# Patient Record
Sex: FEMALE | Race: WHITE | HISPANIC OR LATINO | Employment: OTHER | URBAN - METROPOLITAN AREA
[De-identification: names, ages, dates, MRNs, and addresses within clinical notes are randomized per-mention and may not be internally consistent; named-entity substitution may affect disease eponyms.]

---

## 2017-01-06 RX ORDER — METOPROLOL TARTRATE 25 MG/1
TABLET, FILM COATED ORAL
Qty: 180 TABLET | Refills: 1 | Status: SHIPPED | OUTPATIENT
Start: 2017-01-06 | End: 2017-07-12 | Stop reason: SDUPTHER

## 2017-01-17 ENCOUNTER — OFFICE VISIT (OUTPATIENT)
Dept: CARDIOLOGY | Facility: CLINIC | Age: 82
End: 2017-01-17
Payer: MEDICARE

## 2017-01-17 VITALS
HEIGHT: 56 IN | DIASTOLIC BLOOD PRESSURE: 65 MMHG | BODY MASS INDEX: 26.77 KG/M2 | WEIGHT: 119 LBS | OXYGEN SATURATION: 94 % | HEART RATE: 70 BPM | SYSTOLIC BLOOD PRESSURE: 142 MMHG

## 2017-01-17 DIAGNOSIS — I10 BENIGN HYPERTENSION: ICD-10-CM

## 2017-01-17 DIAGNOSIS — E78.00 PURE HYPERCHOLESTEROLEMIA: ICD-10-CM

## 2017-01-17 DIAGNOSIS — I25.83 CORONARY ARTERY DISEASE DUE TO LIPID RICH PLAQUE: Primary | ICD-10-CM

## 2017-01-17 DIAGNOSIS — I82.401 ACUTE DEEP VEIN THROMBOSIS (DVT) OF RIGHT LOWER EXTREMITY, UNSPECIFIED VEIN: ICD-10-CM

## 2017-01-17 DIAGNOSIS — I82.533: ICD-10-CM

## 2017-01-17 DIAGNOSIS — E03.9 HYPOTHYROIDISM (ACQUIRED): ICD-10-CM

## 2017-01-17 DIAGNOSIS — I73.9 PERIPHERAL VASCULAR DISEASE, UNSPECIFIED: ICD-10-CM

## 2017-01-17 DIAGNOSIS — I25.10 CORONARY ARTERY DISEASE DUE TO LIPID RICH PLAQUE: Primary | ICD-10-CM

## 2017-01-17 PROCEDURE — 99999 PR PBB SHADOW E&M-EST. PATIENT-LVL III: CPT | Mod: PBBFAC,,, | Performed by: INTERNAL MEDICINE

## 2017-01-17 PROCEDURE — 1160F RVW MEDS BY RX/DR IN RCRD: CPT | Mod: S$GLB,,, | Performed by: INTERNAL MEDICINE

## 2017-01-17 PROCEDURE — 99214 OFFICE O/P EST MOD 30 MIN: CPT | Mod: S$GLB,,, | Performed by: INTERNAL MEDICINE

## 2017-01-17 PROCEDURE — 1126F AMNT PAIN NOTED NONE PRSNT: CPT | Mod: S$GLB,,, | Performed by: INTERNAL MEDICINE

## 2017-01-17 PROCEDURE — 1157F ADVNC CARE PLAN IN RCRD: CPT | Mod: S$GLB,,, | Performed by: INTERNAL MEDICINE

## 2017-01-17 PROCEDURE — 1159F MED LIST DOCD IN RCRD: CPT | Mod: S$GLB,,, | Performed by: INTERNAL MEDICINE

## 2017-01-17 PROCEDURE — 99499 UNLISTED E&M SERVICE: CPT | Mod: S$GLB,,, | Performed by: INTERNAL MEDICINE

## 2017-01-17 NOTE — PROGRESS NOTES
Subjective:    Patient ID:  Nany Crenshaw is a 89 y.o. female who presents for follow-up of Coronary Artery Disease      Coronary Artery Disease   Symptoms include muscle weakness.    previous history:  Here for follow-up of DVT.  Her leg swelling has resolved and her right leg.  She feels okay but had some mild dizziness this morning for the first time.  She's not noticed any complications from her anticoagulation.  She's not noticed any signs of bleeding.  She denies any worsening cardiopulmonary complaints.  She's wondering whether she can get off the medicine at this point.  She also has a possible dental extraction coming up.  Otherwise she's better usual state of health.    Today:  Here for follow-up of DVT.  She feels a little bit better than last time.  She says she's had more return of color in her right lower leg.  She denies any worsening pain or cardiopulmonary complaints.  She's been trying to do a little bit of physical therapy with home health.  Otherwise she's better usual state of health.  Again she's mainly wondering about when she can discontinue her oral anticoagulation.  Otherwise she's had no significant problems with this since we last saw her.      Review of Systems   Constitution: Negative.   HENT: Negative.    Eyes: Negative.    Cardiovascular: Negative for dyspnea on exertion, irregular heartbeat, near-syncope, orthopnea, paroxysmal nocturnal dyspnea and syncope.   Skin: Negative.    Musculoskeletal: Positive for joint pain and muscle weakness.   Gastrointestinal: Negative for abdominal pain, constipation and diarrhea.   Genitourinary: Negative for dysuria.   Neurological: Negative.    Psychiatric/Behavioral: Negative.         Objective:    Physical Exam   Constitutional: She is oriented to person, place, and time. She appears well-developed and well-nourished.   HENT:   Head: Normocephalic and atraumatic.   Eyes: Conjunctivae and EOM are normal. Pupils are equal, round, and reactive to  light.   Neck: Normal range of motion. Neck supple. No thyromegaly present.   Cardiovascular: Normal rate and regular rhythm.    No murmur heard.  Pulmonary/Chest: Effort normal and breath sounds normal. No respiratory distress.   Abdominal: Soft. Bowel sounds are normal.   Musculoskeletal: Normal range of motion. She exhibits no edema.   Right calf greater than left calf   Neurological: She is alert and oriented to person, place, and time.   Skin: Skin is warm and dry.   Psychiatric: She has a normal mood and affect. Her behavior is normal.         Assessment:       1. Coronary artery disease due to lipid rich plaque    2. Acute deep vein thrombosis (DVT) of right lower extremity, unspecified vein    3. Benign hypertension    4. Hypothyroidism (acquired)    5. Pure hypercholesterolemia    6. Peripheral vascular disease, unspecified         Plan:       -cont med tx  -cont to follow sx mainly  -cont eliquis and repeat ultrasound of the right lower extremity at next visit      RTC 3 mos w/ US RLE venous

## 2017-01-17 NOTE — MR AVS SNAPSHOT
[facility] - Cardiology  [address] [place] [address] [place] [address]  Phone: [phone]                  Nany Crenshaw   2017 3:00 PM   Office Visit    Descripción:  Female : 1927   Personal Médico:  Santos Noe MD   Departamento:  [facility]           Razón de la bety     Coronary Artery Disease           Diagnósticos de Esta Visita        Comentarios    Coronary artery disease due to lipid rich plaque    -  Primario     Acute deep vein thrombosis (DVT) of right lower extremity, unspecified vein         Benign hypertension         Hypothyroidism (acquired)         Pure hypercholesterolemia         Peripheral vascular disease, unspecified                Lista de tareas           Metas (5 Years of Data)     Ninguna      [facility]     [facility] - Asistencia   Enfermeras registradas de [facility] pueden ayudarle a reservar rey bety, proveer educación para la hugo, asesoría clínica, y otros servicios de asesoramiento.   Llame para yuly servicio gratuito a [phone].             Medicamentos           Mensaje sobre Medicamentos     Verificar los cambios y / o adiciones a flores régimen de medicación son los mismos que discutir con flores médico. Si cualquiera de estos cambios o adiciones son incorrectos, por favor notifique a flores proveedor de atención médica.        DEJAR de brendon estos medicamentos     acetaminophen (TYLENOL) 325 MG tablet Take 650 mg by mouth every 6 (six) hours as needed for Pain.    aspirin (ECOTRIN) 81 MG EC tablet Take 81 mg by mouth once daily.    ferrous sulfate 325 (65 FE) MG EC tablet Take 325 mg by mouth 3 (three) times daily with meals.    escitalopram oxalate (LEXAPRO) 5 MG Tab TAKE 1 TABLET BY MOUTH DAILY    hydrocodone-acetaminophen 7.5-325mg (NORCO) 7.5-325 mg per tablet Take 1 tablet by mouth every 6 (six) hours as needed for Pain.           Verifique que la siguiente lista de medicamentos es rey representación exacta  "de los medicamentos que está tomando actualmente. Si no hay ningunos reportados, la lista puede estar en banuelos. Si no es correcta, por favor póngase en contacto con flores proveedor de atención médica. Lleve esta lista con usted en salome de emergencia.           Medicamentos Actuales     apixaban 5 mg Tab Take 1 tablet (5 mg total) by mouth 2 (two) times daily.    bimatoprost (LUMIGAN) 0.03 % ophthalmic drops 1 drop every evening.    ezetimibe-simvastatin 10-40 mg (VYTORIN 10-40) 10-40 mg per tablet Take 1 tablet by mouth every evening.    hydrochlorothiazide (MICROZIDE) 12.5 mg capsule Take 1 capsule (12.5 mg total) by mouth once daily.    levothyroxine (SYNTHROID) 50 MCG tablet TAKE 1 TABLET BY MOUTH EVERY DAY    lisinopril 10 MG tablet TAKE 1 AND 1/2 TABLETS BY MOUTH EVERY DAY    metoprolol tartrate (LOPRESSOR) 25 MG tablet TAKE 1 TABLET BY MOUTH TWICE DAILY    simvastatin (ZOCOR) 40 MG tablet Take 1 tablet (40 mg total) by mouth every evening.           Información de referencia clínica           Signos vitales - más recientes  Última actualización: 2017  3:14 PM por MANI Richardson [employer] [place] [employer] LMP (última lorraine) SpO2    (!) 142/65 (BP Location: Left arm, Patient Position: Sitting, BP Method: Automatic) 70 4' 8" (1.422 m) 54 kg (119 lb) (LMP Unknown) (!) 94%    BMI (IMC)                   26.68 kg/m2           Blood Pressure          Most Recent Value    BP  (!)  142/65      Alergias     A partir del:  2017        No Known Allergies      Vacunas     Administradas en la fecha de la visita:  2017        None               Nany Crenshaw   2017 3:00 PM   Office Visit    Description:  Female : 1927   Provider:  Santos Noe MD   Department:  [facility] - Cardiology           Reason for Visit     Coronary Artery Disease           Diagnoses this Visit        Comments    Coronary artery disease due to lipid rich plaque    -  Primary     Acute deep vein thrombosis (DVT) of right " lower extremity, unspecified vein         Benign hypertension         Hypothyroidism (acquired)         Pure hypercholesterolemia         Peripheral vascular disease, unspecified                To Do List           Goals     None      [facility]     [facility] On Call Nurse Care Line - 24/7 Assistance  Registered nurses in the [facility] On Call Center provide clinical advisement, health education, appointment booking, and other advisory services.  Call for this free service at [phone].             Medications           Message regarding Medications     Verify the changes and/or additions to your medication regime listed below are the same as discussed with your clinician today.  If any of these changes or additions are incorrect, please notify your healthcare provider.        STOP taking these medications     acetaminophen (TYLENOL) 325 MG tablet Take 650 mg by mouth every 6 (six) hours as needed for Pain.    aspirin (ECOTRIN) 81 MG EC tablet Take 81 mg by mouth once daily.    ferrous sulfate 325 (65 FE) MG EC tablet Take 325 mg by mouth 3 (three) times daily with meals.    escitalopram oxalate (LEXAPRO) 5 MG Tab TAKE 1 TABLET BY MOUTH DAILY    hydrocodone-acetaminophen 7.5-325mg (NORCO) 7.5-325 mg per tablet Take 1 tablet by mouth every 6 (six) hours as needed for Pain.           Verify that the below list of medications is an accurate representation of the medications you are currently taking.  If none reported, the list may be blank. If incorrect, please contact your healthcare provider. Carry this list with you in case of emergency.           Current Medications     apixaban 5 mg Tab Take 1 tablet (5 mg total) by mouth 2 (two) times daily.    bimatoprost (LUMIGAN) 0.03 % ophthalmic drops 1 drop every evening.    ezetimibe-simvastatin 10-40 mg (VYTORIN 10-40) 10-40 mg per tablet Take 1 tablet by mouth every evening.    hydrochlorothiazide (MICROZIDE) 12.5 mg capsule Take 1 capsule (12.5 mg total) by mouth  "once daily.    levothyroxine (SYNTHROID) 50 MCG tablet TAKE 1 TABLET BY MOUTH EVERY DAY    lisinopril 10 MG tablet TAKE 1 AND 1/2 TABLETS BY MOUTH EVERY DAY    metoprolol tartrate (LOPRESSOR) 25 MG tablet TAKE 1 TABLET BY MOUTH TWICE DAILY    simvastatin (ZOCOR) 40 MG tablet Take 1 tablet (40 mg total) by mouth every evening.           Clinical Reference Information           Vital Signs - Last Recorded  Most recent update: 1/17/2017  3:14 PM by Shanita Cook MA    BP Pulse Ht Wt LMP SpO2    (!) 142/65 (BP Location: Left arm, Patient Position: Sitting, BP Method: Automatic) 70 4' 8" (1.422 m) 54 kg (119 lb) (LMP Unknown) (!) 94%    BMI                   26.68 kg/m2           Blood Pressure          Most Recent Value    BP  (!)  142/65      Allergies as of 1/17/2017     No Known Allergies      Immunizations Administered on Date of Encounter - 1/17/2017     None        "

## 2017-01-18 RX ORDER — LISINOPRIL 10 MG/1
TABLET ORAL
Qty: 135 TABLET | Refills: 4 | Status: SHIPPED | OUTPATIENT
Start: 2017-01-18 | End: 2017-02-18

## 2017-02-06 ENCOUNTER — OFFICE VISIT (OUTPATIENT)
Dept: FAMILY MEDICINE | Facility: CLINIC | Age: 82
End: 2017-02-06
Payer: MEDICARE

## 2017-02-06 ENCOUNTER — TELEPHONE (OUTPATIENT)
Dept: INTERNAL MEDICINE | Facility: CLINIC | Age: 82
End: 2017-02-06

## 2017-02-06 VITALS
SYSTOLIC BLOOD PRESSURE: 115 MMHG | HEART RATE: 65 BPM | DIASTOLIC BLOOD PRESSURE: 60 MMHG | HEIGHT: 59 IN | TEMPERATURE: 98 F | WEIGHT: 116.19 LBS | OXYGEN SATURATION: 99 % | BODY MASS INDEX: 23.42 KG/M2

## 2017-02-06 DIAGNOSIS — L02.214 ABSCESS, GROIN: Primary | ICD-10-CM

## 2017-02-06 PROCEDURE — 99999 PR PBB SHADOW E&M-EST. PATIENT-LVL IV: CPT | Mod: PBBFAC,,, | Performed by: NURSE PRACTITIONER

## 2017-02-06 PROCEDURE — 1126F AMNT PAIN NOTED NONE PRSNT: CPT | Mod: S$GLB,,, | Performed by: NURSE PRACTITIONER

## 2017-02-06 PROCEDURE — 99214 OFFICE O/P EST MOD 30 MIN: CPT | Mod: S$GLB,,, | Performed by: NURSE PRACTITIONER

## 2017-02-06 PROCEDURE — 1160F RVW MEDS BY RX/DR IN RCRD: CPT | Mod: S$GLB,,, | Performed by: NURSE PRACTITIONER

## 2017-02-06 PROCEDURE — 1159F MED LIST DOCD IN RCRD: CPT | Mod: S$GLB,,, | Performed by: NURSE PRACTITIONER

## 2017-02-06 PROCEDURE — 1157F ADVNC CARE PLAN IN RCRD: CPT | Mod: S$GLB,,, | Performed by: NURSE PRACTITIONER

## 2017-02-06 RX ORDER — SULFAMETHOXAZOLE AND TRIMETHOPRIM 800; 160 MG/1; MG/1
1 TABLET ORAL 2 TIMES DAILY
Qty: 14 TABLET | Refills: 0 | Status: SHIPPED | OUTPATIENT
Start: 2017-02-06 | End: 2017-02-09 | Stop reason: ALTCHOICE

## 2017-02-06 NOTE — PROGRESS NOTES
Subjective:       Patient ID: Nany Crenshaw is a 89 y.o. female.    Chief Complaint: LUMP ON RIGHT THIGH ([id number])    Cyst   This is a new problem. The current episode started more than 1 month ago (cyst has been there for 2 months but now its infected). The problem occurs constantly. The problem has been gradually worsening. Pertinent negatives include no arthralgias, change in bowel habit, chest pain, chills, congestion, coughing, fatigue, fever, headaches, joint swelling, myalgias, nausea, neck pain, rash, swollen glands, urinary symptoms, vertigo or weakness. Exacerbated by: palaption.     Review of Systems   Constitutional: Negative for chills, fatigue and fever.   HENT: Negative for congestion.    Respiratory: Negative for cough.    Cardiovascular: Negative for chest pain.   Gastrointestinal: Negative for change in bowel habit and nausea.   Musculoskeletal: Negative for arthralgias, joint swelling, myalgias and neck pain.   Skin: Positive for color change and wound. Negative for pallor and rash.   Neurological: Negative for vertigo, weakness and headaches.   Hematological: Negative for adenopathy. Does not bruise/bleed easily.       Objective:      Physical Exam   Constitutional: She is oriented to person, place, and time. Vital signs are normal. She appears well-developed and well-nourished.   HENT:   Head: Normocephalic and atraumatic.   Cardiovascular: Normal rate, regular rhythm and normal heart sounds.    Pulmonary/Chest: Effort normal and breath sounds normal.   Neurological: She is alert and oriented to person, place, and time.   Skin: Skin is warm, dry and intact.        Psychiatric: She has a normal mood and affect.       Assessment:       1. Abscess, groin        Plan:       Nany was seen today for lump on right thigh.    Diagnoses and all orders for this visit:    Abscess, groin  -     sulfamethoxazole-trimethoprim 800-160mg (BACTRIM DS) 800-160 mg Tab; Take 1 tablet by mouth 2 (two) times  daily.  -     Ambulatory consult to General Surgery    ABSCESS [Antibiotic treatment only]  An abscess (sometimes called a boil) occurs when bacteria get trapped under the skin and begin to grow. Pus forms inside the abscess as the body responds to the bacteria. An abscess can occur with an insect bite, ingrown hair, blocked oil gland, pimple, cyst, or puncture wound.  In the early stages, redness and tenderness are the only symptoms. Sometimes, this stage can be treated with antibiotics alone. If the abscess does not respond to antibiotic treatment, it will need to be drained with a small cut, under local anesthesia.  HOME CARE:  · Soak the wound in hot water or apply hot packs (small towel soaked in hot water) to the area for 20 minutes at a time. Do this three to four times a day.  · Apply antibiotic cream or ointment such as Bacitracin or Polysporin onto the skin 3-4 times a day, unless something else was prescribed. Neosporin Plus includes an antibiotic plus a local pain reliever.  · Take all of the antibiotics until they are gone.  · You may use acetaminophen (Tylenol) or ibuprofen (Motrin, Advil) to control pain, unless another pain medicine was prescribed. [ NOTE : If you have chronic liver or kidney disease or ever had a stomach ulcer or GI bleeding, talk with your doctor before using these any of these.]  FOLLOW UP as advised by our staff. Look at your wound each day for the signs of worsening infection listed below.  GET PROMPT MEDICAL ATTENTION if any of the following occur:  · An increase in redness or swelling  · Red streaks in the skin leading away from the abscess  · An increase in local pain or swelling  · Fever of 100.4ºF (38ºC) or higher, or as directed by your healthcare provider  · Pus or fluid coming from the abscess  © 2499-6017 [facility], [address] [place] [address], [place] [address]. All rights reserved. This information is not intended as a substitute for professional medical care.  Always follow your healthcare professional's instructions.

## 2017-02-06 NOTE — MR AVS SNAPSHOT
[facility]  [address] [facility]  [place] [address]  Phone: [phone]  Fax: [phone]                  Nany Crenshaw   2017 4:15 PM   Office Visit    Descripción:  Female : 1927   Personal Médico:  Giacomo London NP   Departamento:  [facility]           Razón de la bety     LUMP ON RIGHT THIGH           Diagnósticos de Esta Visita        Comentarios    Abscess, groin    -  Primario            Lista de tareas           Citas próximas        Personal Médico Departamento Tfno del dpto    2017 1:00 PM Ivette Mcdaniel MD [facility] - Internal Medicine [phone]    3/3/2017 3:00 PM Ivette Mcdaniel MD [facility] Internal Medicine [phone]    2017 4:15 PM [facility] USVAS1 [facility] [phone]    2017 3:00 PM Santos Noe MD [facility] - Cardiology [phone]      Metas (5 Years of Data)     Ninguna      Follow-Up and Disposition     Return if symptoms worsen or fail to improve.      Recetas para recoger        Disp Refills Start End    sulfamethoxazole-trimethoprim 800-160mg (BACTRIM DS) 800-160 mg Tab 14 tablet 0 2017    Take 1 tablet by mouth 2 (two) times daily. - Oral    Farmacia: [facility] [id number] - [place], [place] - [facility] AT [facility] No. de tlfo: #: [phone]         [place] en [place]     [place] Línea de Enfermeras - Asistencia   Enfermeras registradas de [facility] pueden ayudarle a reservar rey bety, proveer educación para la hugo, asesoría clínica, y otros servicios de asesoramiento.   Llame para yuly servicio gratuito a [phone].             Medicamentos           Mensaje sobre Medicamentos     Verificar los cambios y / o adiciones a flores régimen de medicación son los mismos que discutir con flores médico. Si cualquiera de estos cambios o adiciones son incorrectos, por favor notifique a flores proveedor de atención médica.        EMPEZAR a brendon estos  "medicamentos NUEVOS        Refills    sulfamethoxazole-trimethoprim 800-160mg (BACTRIM DS) 800-160 mg Tab 0    Sig: Take 1 tablet by mouth 2 (two) times daily.    Categoría: Normal    Vía: Oral           Verifique que la siguiente lista de medicamentos es rey representación exacta de los medicamentos que está tomando actualmente. Si no hay ningunos reportados, la lista puede estar en banuelos. Si no es correcta, por favor póngase en contacto con flores proveedor de atención médica. Lleve esta lista con usted en salome de emergencia.           Medicamentos Actuales     apixaban 5 mg Tab Take 1 tablet (5 mg total) by mouth 2 (two) times daily.    bimatoprost (LUMIGAN) 0.03 % ophthalmic drops 1 drop every evening.    ezetimibe-simvastatin 10-40 mg (VYTORIN 10-40) 10-40 mg per tablet Take 1 tablet by mouth every evening.    hydrochlorothiazide (MICROZIDE) 12.5 mg capsule Take 1 capsule (12.5 mg total) by mouth once daily.    levothyroxine (SYNTHROID) 50 MCG tablet TAKE 1 TABLET BY MOUTH EVERY DAY    lisinopril 10 MG tablet TAKE 1 AND 1/2 TABLETS BY MOUTH EVERY DAY    metoprolol tartrate (LOPRESSOR) 25 MG tablet TAKE 1 TABLET BY MOUTH TWICE DAILY    simvastatin (ZOCOR) 40 MG tablet Take 1 tablet (40 mg total) by mouth every evening.    sulfamethoxazole-trimethoprim 800-160mg (BACTRIM DS) 800-160 mg Tab Take 1 tablet by mouth 2 (two) times daily.           Información de referencia clínica           Shari signos vitales lionel     PS Pulso Temperatura [place] Peso Ultima menstruación    115/60 (BP Location: Left arm, Patient Position: Sitting, BP Method: Manual) 65 97.7 °F (36.5 °C) (Oral) 4' 11" (1.499 m) 52.7 kg (116 lb 2.9 oz) (LMP Unknown)    SpO2 BMI (IMC)                99% 23.47 kg/m2          Blood Pressure          Most Recent Value    BP  115/60      Alergias     A partir del:  2/6/2017        No Known Allergies      Vacunas     Administradas en la fecha de la visita:  2/6/2017        None      Orders Placed During Today's " "Visit      Órdenes normales de esta visita    Ambulatory consult to General Surgery       Instrucciones      Absceso [Abscess, Antibiotic Treatment Only]  Un absceso (abscess) [en ocasiones también llamado "forúnculo" (boil)] se produce cuando las bacterias quedan atrapadas debajo de la piel y comienzan a crecer. Se forma pus dentro del absceso: ésa es la respuesta del cuerpo ante las bacterias. Se puede producir un absceso por la picadura de un insecto, un vello encarnado, rey glándula sebácea que se tapa, un granito, un quiste o rey lastimadura producida por un pinchazo.  Al comienzo, los únicos síntomas son enrojecimiento y sensibilidad en la lisa afectada. Hay ocasiones en que, jace fadi etapa, maximino un tratamiento con antibióticos (antibiotics). Si el absceso (abscess) no responde al tratamiento con antibióticos (antibiotic), se lo deberá drenar mediante un pequeño connie que el médico le hará después de colocarle rey anestesia local (local anesthesia).  Cuidados En La Casa:  1. Humedezca la herida con [place] o aplique compresas calientes (toallitas empapadas en [place]) sobre la lisa afectada jace 20 minutos cada vez. Hágalo samantha o cuatro veces al día.  2. Aplique un antibiótico en crema o en pomada (antibiotic cream/ointment), norah kasey Bacitracin o Neosporin sobre la piel 3 ó 4 veces al día, a menos que le hayan recetado otro medicamento. Neosporin Plus incluye un antibiótico (antibiotics) además de un calmante (pain reliever) local.  3. [place] todos los antibióticos (antibiotics) hasta terminarlos.  4. Puede usar acetaminofén (acetaminophen) [Tylenol] o ibuprofeno (ibuprofen) [Motrin o Advil] para controlar el dolor, a menos que le hayan recetado otro medicamento. [ NOTA: Si tiene rey enfermedad hepática o renal crónica (chronic liver or kidney disease), o ha tenido alguna vez rey úlcera estomacal (stomach ulcer) o sangrado gastrointestinal (GI bleeding), consulte con flores médico antes de " brendon estos medicamentos.]  Shalonda rey VISITA DE CONTROL según le indique nuestro personal médico. Observe la herida todos los días para octavio si aparecen los signos descritos a continuación que indican que la infección (infection) ha empeorado:  Busque Prontamente Atención Médica  si algo de lo siguiente ocurre:  · Hinchazón o enrojecimiento que aumentan.  · Vetas jerome en la piel que se alejan del absceso.  · Un aumento en la hinchazón o el dolor de la lisa afectada.  · Fiebre de 100.4°F (38°C) o más marcos, o kasey le haya indicado flores proveedor de atención médica.  · Pus o líquido que supuran del absceso.  Date Last Reviewed: 3/31/2014  © 3386-8217 [employer]. [address] [place] [address]. Todos los derechos reservados. Esta información no pretende sustituir la atención médica profesional. Sólo flores médico puede diagnosticar y tratar un problema de hugo.             Language Assistance Services     ATTENTION: Language assistance services are available, free of charge. Please call [phone].      ATENCIÓN: Si habla español, tiene a flores disposición servicios gratuitos de asistencia lingüística. Llame al [phone].     [facility] Ý: N?u b?n nói Ti?ng Vi?t, có các d?ch v? h? tr? ngôn ng? mi?n phí dành cho b?n. G?i s? [phone].         [facility] cumple con las leyes federales aplicables de derechos civiles y no discrimina por motivos de jim, color, origen nacional, edad, discapacidad, o sexo.                 Nany Crenshaw   2017 4:15 PM   Office Visit    Description:  Female : 1927   Provider:  Giacomo London NP   Department:  [facility]           Reason for Visit     LUMP ON RIGHT THIGH           Diagnoses this Visit        Comments    Abscess, groin    -  Primary            To Do List           Future Appointments        Provider Department Dept Phone    2017 1:00 PM Ivette Mcdaniel MD [facility] - Internal Medicine [phone]     3/3/2017 3:00 PM Ivette Mcdaniel MD [facility] - Internal Medicine [phone]    4/4/2017 4:15 PM [facility] USVAS1 [facility] [phone]    4/11/2017 3:00 PM Santos Noe MD [facility] - Cardiology [phone]      Goals     None      Follow-Up and Disposition     Return if symptoms worsen or fail to improve.       These Medications        Disp Refills Start End    sulfamethoxazole-trimethoprim 800-160mg (BACTRIM DS) 800-160 mg Tab 14 tablet 0 2/6/2017 2/13/2017    Take 1 tablet by mouth 2 (two) times daily. - Oral    Pharmacy: [facility] [address] [place] [address] MARYSOL MARTIN [address] AT [facility] #: [phone]         [facility]     [facility] Nurse Care [facility] - 24/7 Assistance  Registered nurses in the [facility] provide clinical advisement, health education, appointment booking, and other advisory services.  Call for this free service at [phone].             Medications           Message regarding Medications     Verify the changes and/or additions to your medication regime listed below are the same as discussed with your clinician today.  If any of these changes or additions are incorrect, please notify your healthcare provider.        START taking these NEW medications        Refills    sulfamethoxazole-trimethoprim 800-160mg (BACTRIM DS) 800-160 mg Tab 0    Sig: Take 1 tablet by mouth 2 (two) times daily.    Class: Normal    Route: Oral           Verify that the below list of medications is an accurate representation of the medications you are currently taking.  If none reported, the list may be blank. If incorrect, please contact your healthcare provider. Carry this list with you in case of emergency.           Current Medications     apixaban 5 mg Tab Take 1 tablet (5 mg total) by mouth 2 (two) times daily.    bimatoprost (LUMIGAN) 0.03 % ophthalmic drops 1 drop every evening.    ezetimibe-simvastatin 10-40 mg (VYTORIN 10-40) 10-40  "mg per tablet Take 1 tablet by mouth every evening.    hydrochlorothiazide (MICROZIDE) 12.5 mg capsule Take 1 capsule (12.5 mg total) by mouth once daily.    levothyroxine (SYNTHROID) 50 MCG tablet TAKE 1 TABLET BY MOUTH EVERY DAY    lisinopril 10 MG tablet TAKE 1 AND 1/2 TABLETS BY MOUTH EVERY DAY    metoprolol tartrate (LOPRESSOR) 25 MG tablet TAKE 1 TABLET BY MOUTH TWICE DAILY    simvastatin (ZOCOR) 40 MG tablet Take 1 tablet (40 mg total) by mouth every evening.    sulfamethoxazole-trimethoprim 800-160mg (BACTRIM DS) 800-160 mg Tab Take 1 tablet by mouth 2 (two) times daily.           Clinical Reference Information           Your Vitals Were     BP Pulse Temp Height Weight Last Period    115/60 (BP Location: Left arm, Patient Position: Sitting, BP Method: Manual) 65 97.7 °F (36.5 °C) (Oral) 4' 11" (1.499 m) 52.7 kg (116 lb 2.9 oz) (LMP Unknown)    SpO2 BMI                99% 23.47 kg/m2          Blood Pressure          Most Recent Value    BP  115/60      Allergies as of 2/6/2017     No Known Allergies      Immunizations Administered on Date of Encounter - 2/6/2017     None      Orders Placed During Today's Visit      Normal Orders This Visit    Ambulatory consult to General Surgery       Instructions      Absceso [Abscess, Antibiotic Treatment Only]  Un absceso (abscess) [en ocasiones también llamado "forúnculo" (boil)] se produce cuando las bacterias quedan atrapadas debajo de la piel y comienzan a crecer. Se forma pus dentro del absceso: ésa es la respuesta del cuerpo ante las bacterias. Se puede producir un absceso por la picadura de un insecto, un vello encarnado, rey glándula sebácea que se tapa, un granito, un quiste o rey lastimadura producida por un pinchazo.  Al comienzo, los únicos síntomas son enrojecimiento y sensibilidad en la lisa afectada. Hay ocasiones en que, jace fadi etapa, maximino un tratamiento con antibióticos (antibiotics). Si el absceso (abscess) no responde al tratamiento con " antibióticos (antibiotic), se lo deberá drenar mediante un pequeño connie que el médico le hará después de colocarle rey anestesia local (local anesthesia).  Cuidados En La Casa:  1. Humedezca la herida con [place] o aplique compresas calientes (toallitas empapadas en [place]) sobre la lisa afectada jace 20 minutos cada vez. Hágalo samantha o cuatro veces al día.  2. Aplique un antibiótico en crema o en pomada (antibiotic cream/ointment), norah kasey Bacitracin o Neosporin sobre la piel 3 ó 4 veces al día, a menos que le hayan recetado otro medicamento. Neosporin Plus incluye un antibiótico (antibiotics) además de un calmante (pain reliever) local.  3. [place] todos los antibióticos (antibiotics) hasta terminarlos.  4. Puede usar acetaminofén (acetaminophen) [Tylenol] o ibuprofeno (ibuprofen) [Motrin o Advil] para controlar el dolor, a menos que le hayan recetado otro medicamento. [ NOTA: Si tiene rey enfermedad hepática o renal crónica (chronic liver or kidney disease), o ha tenido alguna vez rey úlcera estomacal (stomach ulcer) o sangrado gastrointestinal (GI bleeding), consulte con flores médico antes de brendon estos medicamentos.]  Shalonda rey VISITA DE CONTROL según le indique nuestro personal médico. Observe la herida todos los días para octavio si aparecen los signos descritos a continuación que indican que la infección (infection) ha empeorado:  Busque Prontamente Atención Médica  si algo de lo siguiente ocurre:  · Hinchazón o enrojecimiento que aumentan.  · Vetas jerome en la piel que se alejan del absceso.  · Un aumento en la hinchazón o el dolor de la lisa afectada.  · Fiebre de 100.4°F (38°C) o más marcos, o kasey le haya indicado flores proveedor de atención médica.  · Pus o líquido que supuran del absceso.  Date Last Reviewed: 3/31/2014  © 5911-3605 [employer]. [address] [place] [address], [place] [address]. Todos los derechos reservados. Esta información no pretende sustituir la atención médica  profesional. Sólo flores médico puede diagnosticar y tratar un problema de hugo.             Language Assistance Services     ATTENTION: Language assistance services are available, free of charge. Please call [phone].      ATENCIÓN: Si habla español, tiene a flores disposición servicios gratuitos de asistencia lingüística. Llame al [phone].     [facility] Ý: N?u b?n nói Ti?ng Vi?t, có các d?ch v? h? tr? ngôn ng? mi?n phí dành cho b?n. G?i s? [phone].         [facility] complies with applicable Federal civil rights laws and does not discriminate on the basis of race, color, national origin, age, disability, or sex.

## 2017-02-06 NOTE — PATIENT INSTRUCTIONS
"  Absceso [Abscess, Antibiotic Treatment Only]  Un absceso (abscess) [en ocasiones también llamado "forúnculo" (boil)] se produce cuando las bacterias quedan atrapadas debajo de la piel y comienzan a crecer. Se forma pus dentro del absceso: ésa es la respuesta del cuerpo ante las bacterias. Se puede producir un absceso por la picadura de un insecto, un vello encarnado, rey glándula sebácea que se tapa, un granito, un quiste o rey lastimadura producida por un pinchazo.  Al comienzo, los únicos síntomas son enrojecimiento y sensibilidad en la lisa afectada. Hay ocasiones en que, jace fadi etapa, maximino un tratamiento con antibióticos (antibiotics). Si el absceso (abscess) no responde al tratamiento con antibióticos (antibiotic), se lo deberá drenar mediante un pequeño connie que el médico le hará después de colocarle rey anestesia local (local anesthesia).  Cuidados En La Casa:  1. Humedezca la herida con [place] o aplique compresas calientes (toallitas empapadas en [place]) sobre la lisa afectada jace 20 minutos cada vez. Hágalo samantha o cuatro veces al día.  2. Aplique un antibiótico en crema o en pomada (antibiotic cream/ointment), norah kasey Bacitracin o Neosporin sobre la piel 3 ó 4 veces al día, a menos que le hayan recetado otro medicamento. Neosporin Plus incluye un antibiótico (antibiotics) además de un calmante (pain reliever) local.  3. [place] todos los antibióticos (antibiotics) hasta terminarlos.  4. Puede usar acetaminofén (acetaminophen) [Tylenol] o ibuprofeno (ibuprofen) [Motrin o Advil] para controlar el dolor, a menos que le hayan recetado otro medicamento. [ NOTA: Si tiene rey enfermedad hepática o renal crónica (chronic liver or kidney disease), o ha tenido alguna vez rey úlcera estomacal (stomach ulcer) o sangrado gastrointestinal (GI bleeding), consulte con flores médico antes de brendon estos medicamentos.]  Shalonda rey VISITA DE CONTROL según le indique nuestro personal médico. Observe la " herida todos los días para octavio si aparecen los signos descritos a continuación que indican que la infección (infection) ha empeorado:  Busque Prontamente Atención Médica  si algo de lo siguiente ocurre:  · Hinchazón o enrojecimiento que aumentan.  · Vetas jerome en la piel que se alejan del absceso.  · Un aumento en la hinchazón o el dolor de la lisa afectada.  · Fiebre de 100.4°F (38°C) o más marcos, o kasey le haya indicado flores proveedor de atención médica.  · Pus o líquido que supuran del absceso.  Date Last Reviewed: 3/31/2014  © 9805-6410 [employer]. [address] [place] [id number]. Todos los derechos reservados. Esta información no pretende sustituir la atención médica profesional. Sólo flores médico puede diagnosticar y tratar un problema de hugo.

## 2017-02-07 ENCOUNTER — OFFICE VISIT (OUTPATIENT)
Dept: INTERNAL MEDICINE | Facility: CLINIC | Age: 82
End: 2017-02-07
Payer: MEDICARE

## 2017-02-07 VITALS
DIASTOLIC BLOOD PRESSURE: 64 MMHG | BODY MASS INDEX: 25.31 KG/M2 | SYSTOLIC BLOOD PRESSURE: 114 MMHG | WEIGHT: 120.56 LBS | HEIGHT: 58 IN | HEART RATE: 70 BPM

## 2017-02-07 DIAGNOSIS — I10 ESSENTIAL HYPERTENSION: ICD-10-CM

## 2017-02-07 DIAGNOSIS — I82.401 ACUTE DEEP VEIN THROMBOSIS (DVT) OF RIGHT LOWER EXTREMITY, UNSPECIFIED VEIN: ICD-10-CM

## 2017-02-07 DIAGNOSIS — E03.9 HYPOTHYROIDISM, UNSPECIFIED TYPE: ICD-10-CM

## 2017-02-07 DIAGNOSIS — L08.9 INFECTED SEBACEOUS CYST: Primary | ICD-10-CM

## 2017-02-07 DIAGNOSIS — L72.3 INFECTED SEBACEOUS CYST: Primary | ICD-10-CM

## 2017-02-07 PROCEDURE — 99214 OFFICE O/P EST MOD 30 MIN: CPT | Mod: S$GLB,,, | Performed by: INTERNAL MEDICINE

## 2017-02-07 PROCEDURE — 1157F ADVNC CARE PLAN IN RCRD: CPT | Mod: S$GLB,,, | Performed by: INTERNAL MEDICINE

## 2017-02-07 PROCEDURE — 1160F RVW MEDS BY RX/DR IN RCRD: CPT | Mod: S$GLB,,, | Performed by: INTERNAL MEDICINE

## 2017-02-07 PROCEDURE — 1159F MED LIST DOCD IN RCRD: CPT | Mod: S$GLB,,, | Performed by: INTERNAL MEDICINE

## 2017-02-07 PROCEDURE — 99499 UNLISTED E&M SERVICE: CPT | Mod: S$GLB,,, | Performed by: INTERNAL MEDICINE

## 2017-02-07 PROCEDURE — 1126F AMNT PAIN NOTED NONE PRSNT: CPT | Mod: S$GLB,,, | Performed by: INTERNAL MEDICINE

## 2017-02-07 PROCEDURE — 99999 PR PBB SHADOW E&M-EST. PATIENT-LVL III: CPT | Mod: PBBFAC,,, | Performed by: INTERNAL MEDICINE

## 2017-02-07 NOTE — PROGRESS NOTES
"Subjective:       Patient ID: Nany Crenshaw is a 89 y.o. female.    Chief Complaint: Mass   this is an 89-year-old-year-old who presents today for follow-up she has an area on her groin that has been painful and swollen she went to urgent care and was placed on Bactrim which she started last night and here for follow-up she had noticed some cysts or bulging in that area for some time but the redness or discomfort didn't start until recently.  She is continuing to follow with her cardiologist and remains on apixaban for DVT she denies fevers or chills     HPI  Review of Systems   Constitutional: Negative for fatigue and fever.   Cardiovascular:        Leg swelling improved    Skin:        Lump now redness in groin        Objective:     Blood pressure 114/64, pulse 70, height 4' 10" (1.473 m), weight 54.7 kg (120 lb 9.5 oz).    Physical Exam   Constitutional: No distress.   HENT:   Head: Normocephalic.   Mouth/Throat: Oropharynx is clear and moist.   Eyes: No scleral icterus.   Neck: Neck supple.   Cardiovascular: Normal rate, regular rhythm and normal heart sounds.    Pulmonary/Chest: Effort normal and breath sounds normal. No respiratory distress.   Abdominal: Soft. Bowel sounds are normal. She exhibits no mass. There is no tenderness.   Groin inflammed cyst  Some erythema and   bulding area no active drainage.    Musculoskeletal: She exhibits no edema.   Varicose veins  Swelling improved    Neurological: She is alert.   Vitals reviewed.      Assessment:       1. Infected sebaceous cyst    2. Essential hypertension    3. Hypothyroidism, unspecified type    4. Acute deep vein thrombosis (DVT) of right lower extremity, unspecified vein        Plan:       Nany was seen today for mass.    Diagnoses and all orders for this visit:    Infected sebaceous cyst  She was placed on bactrim urgent care risk benefits reveweids call with concerns mainatin current course   Discussed with patient and daughter planned surgery " evaluation if no improvement she may need I&D discussed she would need to hold her apixiban  Prior if needed   Will schedule appt she will have available if no impromvent     Essential hypertension  Blood pressure acceptable continue present regimen  -     Basic metabolic panel; Future  -     CBC auto differential; Future  -     TSH; Future  -     T4, free; Future  -     Hepatic function panel; Future  -     Lipid panel; Future    Hypothyroidism, unspecified type  \Update labs will she is here  -     Basic metabolic panel; Future  -     CBC auto differential; Future  -     TSH; Future  -     T4, free; Future  -     Hepatic function panel; Future  -     Lipid panel; Future    Hx dvt she is on apixiban following with cardiology    Follow up next week recheck

## 2017-02-07 NOTE — MR AVS SNAPSHOT
[facility]  [address] [place] [address]  Phone: [phone]  Fax: [phone]                  Nany Crenshaw   2017 1:00 PM   Office Visit    Descripción:  Female : 1927   Personal Médico:  Ivette Mcdaniel MD   Departamento:  [facility]           Razón de la bety     Mass           Diagnósticos de Esta Visita        Comentarios    Infected sebaceous cyst    -  Primario     Essential hypertension         Hypothyroidism, unspecified type                Lista de tareas           Citas próximas        Personal Médico Departamento Tfno del dpto    2017 2:00 PM LAB, APPOINTMENT [facility] [phone]    2/10/2017 10:15 AM Joshua Goldberg, MD [facility] - General Surgery [phone]    3/3/2017 3:00 PM MD [facility] Medicine [phone]    3/14/2017 11:00 AM Ivette Mcdaniel MD [facility] - Internal Medicine [phone]    2017 4:15 PM [employer] [facility] [phone]      Metas (5 Years of Data)     Ninguna      [facility]     [facility] Línea de Enfermeras - Asistencia   Enfermeras registradas de [facility] pueden ayudarle a reservar rey beyt, proveer educación para la hugo, asesoría clínica, y otros servicios de asesoramiento.   Llame para yuly servicio gratuito a [phone].             Medicamentos           Mensaje sobre Medicamentos     Verificar los cambios y / o adiciones a flores régimen de medicación son los mismos que discutir con flores médico. Si cualquiera de estos cambios o adiciones son incorrectos, por favor notifique a flores proveedor de atención médica.             Verifique que la siguiente lista de medicamentos es rey representación exacta de los medicamentos que está tomando actualmente. Si no hay ningunos reportados, la lista puede estar en banuelos. Si no es correcta, por favor póngase en contacto con flores proveedor de  "atención médica. Lleve esta lista con usted en salome de emergencia.           Medicamentos Actuales     apixaban 5 mg Tab Take 1 tablet (5 mg total) by mouth 2 (two) times daily.    bimatoprost (LUMIGAN) 0.03 % ophthalmic drops 1 drop every evening.    ezetimibe-simvastatin 10-40 mg (VYTORIN 10-40) 10-40 mg per tablet Take 1 tablet by mouth every evening.    hydrochlorothiazide (MICROZIDE) 12.5 mg capsule Take 1 capsule (12.5 mg total) by mouth once daily.    levothyroxine (SYNTHROID) 50 MCG tablet TAKE 1 TABLET BY MOUTH EVERY DAY    lisinopril 10 MG tablet TAKE 1 AND 1/2 TABLETS BY MOUTH EVERY DAY    metoprolol tartrate (LOPRESSOR) 25 MG tablet TAKE 1 TABLET BY MOUTH TWICE DAILY    sulfamethoxazole-trimethoprim 800-160mg (BACTRIM DS) 800-160 mg Tab Take 1 tablet by mouth 2 (two) times daily.    simvastatin (ZOCOR) 40 MG tablet Take 1 tablet (40 mg total) by mouth every evening.           Información de referencia clínica           Shari signos vitales lionel     PS Pulso Letts Peso Ultima menstruación BMI (IMC)    114/64 70 4' 10" (1.473 m) 54.7 kg (120 lb 9.5 oz) (LMP Unknown) 25.2 kg/m2      Blood Pressure          Most Recent Value    BP  114/64      Alergias     A partir del:  2/7/2017        No Known Allergies      Vacunas     Administradas en la fecha de la visita:  2/7/2017        None      Orders Placed During Today's Visit     Exámenes/Procedimientos futuros Se espera el Vence    Basic metabolic panel  2/7/2017 4/8/2017    CBC auto differential  2/7/2017 4/10/2017    Hepatic function panel  2/7/2017 4/10/2017    Lipid panel  2/7/2017 (Approximate) 4/8/2017    T4, free  2/7/2017 4/8/2017    TSH  2/7/2017 (Approximate) 4/8/2017      Language Assistance Services     ATTENTION: Language assistance services are available, free of charge. Please call [phone].      ATENCIÓN: Si habla español, tiene a flores disposición servicios gratuitos de asistencia lingüística. Llame al [phone].     GRABIEL Ý: N?u b?n nói " Ti?ng Vi?t, có các d?ch v? h? tr? ngôn ng? mi?n phí dành cho b?n. G?i s? [phone].         [facility] cumple con las leyes federales aplicables de derechos civiles y no discrimina por motivos de jim, color, origen nacional, edad, discapacidad, o sexo.                 Nany Crenshaw   2017 1:00 PM   Office Visit    Description:  Female : 1927   Provider:  Ivette Mcdaniel MD   Department:  [facility]           Reason for Visit     Mass           Diagnoses this Visit        Comments    Infected sebaceous cyst    -  Primary     Essential hypertension         Hypothyroidism, unspecified type                To Do List           Future Appointments        Provider Department Dept Phone    2017 2:00 PM LAB, APPOINTMENT [facility] [phone]    2/10/2017 10:15 AM Joshua Goldberg, MD [facility] - General Surgery [phone]    3/3/2017 3:00 PM MD [facility] Internal Medicine [phone]    3/14/2017 11:00 AM Ivette Mcdaniel MD [facility] Internal Medicine [phone]    2017 4:15 PM [facility] [phone]      Goals     None      [facility]     [facility] Nurse Care Line -  Assistance  Registered nurses in the [facility] provide clinical advisement, health education, appointment booking, and other advisory services.  Call for this free service at [phone].             Medications           Message regarding Medications     Verify the changes and/or additions to your medication regime listed below are the same as discussed with your clinician today.  If any of these changes or additions are incorrect, please notify your healthcare provider.             Verify that the below list of medications is an accurate representation of the medications you are currently taking.  If none reported, the list may be blank. If incorrect, please contact your  "healthcare provider. Carry this list with you in case of emergency.           Current Medications     apixaban 5 mg Tab Take 1 tablet (5 mg total) by mouth 2 (two) times daily.    bimatoprost (LUMIGAN) 0.03 % ophthalmic drops 1 drop every evening.    ezetimibe-simvastatin 10-40 mg (VYTORIN 10-40) 10-40 mg per tablet Take 1 tablet by mouth every evening.    hydrochlorothiazide (MICROZIDE) 12.5 mg capsule Take 1 capsule (12.5 mg total) by mouth once daily.    levothyroxine (SYNTHROID) 50 MCG tablet TAKE 1 TABLET BY MOUTH EVERY DAY    lisinopril 10 MG tablet TAKE 1 AND 1/2 TABLETS BY MOUTH EVERY DAY    metoprolol tartrate (LOPRESSOR) 25 MG tablet TAKE 1 TABLET BY MOUTH TWICE DAILY    sulfamethoxazole-trimethoprim 800-160mg (BACTRIM DS) 800-160 mg Tab Take 1 tablet by mouth 2 (two) times daily.    simvastatin (ZOCOR) 40 MG tablet Take 1 tablet (40 mg total) by mouth every evening.           Clinical Reference Information           Your Vitals Were     BP Pulse Height Weight Last Period BMI    114/64 70 4' 10" (1.473 m) 54.7 kg (120 lb 9.5 oz) (LMP Unknown) 25.2 kg/m2      Blood Pressure          Most Recent Value    BP  114/64      Allergies as of 2/7/2017     No Known Allergies      Immunizations Administered on Date of Encounter - 2/7/2017     None      Orders Placed During Today's Visit     Future Labs/Procedures Expected by Expires    Basic metabolic panel  2/7/2017 4/8/2017    CBC auto differential  2/7/2017 4/10/2017    Hepatic function panel  2/7/2017 4/10/2017    Lipid panel  2/7/2017 (Approximate) 4/8/2017    T4, free  2/7/2017 4/8/2017    TSH  2/7/2017 (Approximate) 4/8/2017      Language Assistance Services     ATTENTION: Language assistance services are available, free of charge. Please call [phone].      ATENCIÓN: Si habla jimmie, tiene a flores disposición servicios gratuitos de asistencia lingüística. Llame al [phone].     CHÚ Ý: N?u b?n nói Ti?ng Vi?t, có các d?ch v? h? tr? ngôn ng? mi?n phí " dành cho b?n. G?i s? [phone].         [facility] complies with applicable Federal civil rights laws and does not discriminate on the basis of race, color, national origin, age, disability, or sex.

## 2017-02-09 ENCOUNTER — TELEPHONE (OUTPATIENT)
Dept: INTERNAL MEDICINE | Facility: CLINIC | Age: 82
End: 2017-02-09

## 2017-02-09 DIAGNOSIS — I10 ESSENTIAL HYPERTENSION: Primary | ICD-10-CM

## 2017-02-09 RX ORDER — DOXYCYCLINE 100 MG/1
100 CAPSULE ORAL 2 TIMES DAILY
Qty: 20 CAPSULE | Refills: 0 | Status: SHIPPED | OUTPATIENT
Start: 2017-02-09 | End: 2017-02-11

## 2017-02-09 NOTE — TELEPHONE ENCOUNTER
----- Message from Larisa Payton sent at 2/9/2017 12:33 PM CST -----  Contact: Marisol Daughter [phone]  Pt was seen on 02/07 and was prescribed BACTRIM and she states it is making her sick and would like to know can it be changed,Please call

## 2017-02-10 ENCOUNTER — NURSE TRIAGE (OUTPATIENT)
Dept: ADMINISTRATIVE | Facility: CLINIC | Age: 82
End: 2017-02-10

## 2017-02-10 ENCOUNTER — TELEPHONE (OUTPATIENT)
Dept: INTERNAL MEDICINE | Facility: CLINIC | Age: 82
End: 2017-02-10

## 2017-02-10 ENCOUNTER — OFFICE VISIT (OUTPATIENT)
Dept: SURGERY | Facility: CLINIC | Age: 82
End: 2017-02-10
Payer: MEDICARE

## 2017-02-10 VITALS
BODY MASS INDEX: 25.19 KG/M2 | SYSTOLIC BLOOD PRESSURE: 114 MMHG | HEIGHT: 58 IN | HEART RATE: 66 BPM | WEIGHT: 120 LBS | TEMPERATURE: 99 F | DIASTOLIC BLOOD PRESSURE: 55 MMHG

## 2017-02-10 DIAGNOSIS — L72.0 EPIDERMAL INCLUSION CYST: Primary | ICD-10-CM

## 2017-02-10 PROCEDURE — 1126F AMNT PAIN NOTED NONE PRSNT: CPT | Mod: S$GLB,,, | Performed by: PHYSICIAN ASSISTANT

## 2017-02-10 PROCEDURE — 99499 UNLISTED E&M SERVICE: CPT | Mod: S$GLB,,, | Performed by: PHYSICIAN ASSISTANT

## 2017-02-10 PROCEDURE — 99999 PR PBB SHADOW E&M-EST. PATIENT-LVL III: CPT | Mod: PBBFAC,,, | Performed by: PHYSICIAN ASSISTANT

## 2017-02-10 PROCEDURE — 99203 OFFICE O/P NEW LOW 30 MIN: CPT | Mod: S$GLB,,, | Performed by: PHYSICIAN ASSISTANT

## 2017-02-10 PROCEDURE — 1157F ADVNC CARE PLAN IN RCRD: CPT | Mod: S$GLB,,, | Performed by: PHYSICIAN ASSISTANT

## 2017-02-10 PROCEDURE — 1159F MED LIST DOCD IN RCRD: CPT | Mod: S$GLB,,, | Performed by: PHYSICIAN ASSISTANT

## 2017-02-10 PROCEDURE — 1160F RVW MEDS BY RX/DR IN RCRD: CPT | Mod: S$GLB,,, | Performed by: PHYSICIAN ASSISTANT

## 2017-02-10 NOTE — PROGRESS NOTES
History & Physical    SUBJECTIVE:     History of Present Illness:  Patient is a 89 y.o. female presents with groin cyst. Onset of symptoms was gradual starting a few months ago with gradually worsening course since that time. She went to her PCP who put her on antibiotics, she has since stopped taking them, since they caused her nausea and vomiting. Patient denies fevers and chills. Cyst is currently draining. Symptoms are aggravated by palpation. Symptoms improve with nothing. She is currently on apixaban due to DVT.      No chief complaint on file.      Review of patient's allergies indicates:  No Known Allergies    Current Outpatient Prescriptions   Medication Sig Dispense Refill    apixaban 5 mg Tab Take 1 tablet (5 mg total) by mouth 2 (two) times daily. 60 tablet 4    bimatoprost (LUMIGAN) 0.03 % ophthalmic drops 1 drop every evening.      doxycycline (MONODOX) 100 MG capsule Take 1 capsule (100 mg total) by mouth 2 (two) times daily. 20 capsule 0    ezetimibe-simvastatin 10-40 mg (VYTORIN 10-40) 10-40 mg per tablet Take 1 tablet by mouth every evening. 90 tablet 3    hydrochlorothiazide (MICROZIDE) 12.5 mg capsule Take 1 capsule (12.5 mg total) by mouth once daily. 90 capsule 3    levothyroxine (SYNTHROID) 50 MCG tablet TAKE 1 TABLET BY MOUTH EVERY DAY 90 tablet 1    lisinopril 10 MG tablet TAKE 1 AND 1/2 TABLETS BY MOUTH EVERY  tablet 4    metoprolol tartrate (LOPRESSOR) 25 MG tablet TAKE 1 TABLET BY MOUTH TWICE DAILY 180 tablet 1    simvastatin (ZOCOR) 40 MG tablet Take 1 tablet (40 mg total) by mouth every evening. 90 tablet 3     No current facility-administered medications for this visit.        Past Medical History   Diagnosis Date    Colon polyp      no further colon needed    Coronary artery disease     Diverticulosis     Fracture of right distal radius     GERD (gastroesophageal reflux disease)     Hypertension     Hypothyroidism (acquired)     Osteoporosis, post-menopausal      Scoliosis      Past Surgical History   Procedure Laterality Date    Cataract extraction       ou    Yag cap       od    Hysterectomy      Adenoidectomy       section      Cholecystectomy      Eye surgery  2014     ptosis surgery       Family History   Problem Relation Age of Onset    Cancer Mother      skin cancer     Dementia Mother     Hypertension Mother     Heart disease Brother     Cancer Brother      lung cancer     No Known Problems Father     No Known Problems Sister     No Known Problems Maternal Aunt     No Known Problems Maternal Uncle     No Known Problems Paternal Aunt     No Known Problems Paternal Uncle     No Known Problems Maternal Grandmother     No Known Problems Maternal Grandfather     No Known Problems Paternal Grandmother     No Known Problems Paternal Grandfather     Amblyopia Neg Hx     Blindness Neg Hx     Cataracts Neg Hx     Diabetes Neg Hx     Glaucoma Neg Hx     Macular degeneration Neg Hx     Retinal detachment Neg Hx     Strabismus Neg Hx     Stroke Neg Hx     Thyroid disease Neg Hx      Social History   Substance Use Topics    Smoking status: Never Smoker    Smokeless tobacco: None    Alcohol use 3.6 oz/week     6 Glasses of wine per week      Comment: occasional /social        Review of Systems:  Review of Systems   Constitutional: Negative for chills and fever.   HENT: Negative for congestion and rhinorrhea.    Eyes: Negative for photophobia and visual disturbance.   Respiratory: Negative for cough and shortness of breath.    Cardiovascular: Negative for chest pain and palpitations.   Gastrointestinal: Negative for nausea and vomiting.   Endocrine: Negative for cold intolerance and heat intolerance.   Musculoskeletal: Negative for arthralgias and myalgias.   Skin: Positive for wound. Negative for rash.   Allergic/Immunologic: Negative for immunocompromised state.   Neurological: Negative for tremors and weakness.   Hematological:  "Negative for adenopathy.   Psychiatric/Behavioral: Negative for agitation.       OBJECTIVE:     Vital Signs (Most Recent)  Temp: 99.4 °F (37.4 °C) (02/10/17 1053)  Pulse: 66 (02/10/17 1053)  BP: (!) 114/55 (02/10/17 1053)  4' 10" (1.473 m)  54.4 kg (120 lb)     Physical Exam:  Physical Exam   Constitutional: She is oriented to person, place, and time. She appears well-developed and well-nourished. No distress.   HENT:   Head: Normocephalic and atraumatic.   Eyes: Pupils are equal, round, and reactive to light.   Neck: Normal range of motion. Neck supple.   Cardiovascular: Normal rate and regular rhythm.    Pulmonary/Chest: Effort normal. No respiratory distress.   Abdominal: Soft. She exhibits no distension. There is no tenderness. There is no guarding.   Musculoskeletal: Normal range of motion. She exhibits no edema or tenderness.   Neurological: She is alert and oriented to person, place, and time.   Skin: Skin is warm and dry.   +cyst noted on suprapubic area - +TTP, minimal erythema, minimal drainage of cyst wall, minimal swelling   Psychiatric: She has a normal mood and affect.     ASSESSMENT/PLAN:   89 yo female w cyst on suprapubic area  -plan for complete removal of cyst in minors  -discussed with patient and family to hold apixaban on tues-thurday     "

## 2017-02-10 NOTE — LETTER
February 10, 2017      Giacomo London, MENDEL  [address]  [place] [address]           [facility]  [address]  [place] [address]  Phone: [phone]          Patient: Nany Crenshaw   MR Number: [id number]   YOB: 1927   Date of Visit: 2/10/2017       Dear Giacomo London:    Thank you for referring Nany Crenshaw to me for evaluation. Attached you will find relevant portions of my assessment and plan of care.    If you have questions, please do not hesitate to call me. I look forward to following Nany Crenshaw along with you.    Sincerely,    Mirta Alamo PA-C    Enclosure  CC:  No Recipients    If you would like to receive this communication electronically, please contact [email or web address] or [phone] to request more information on [employer] Link access.    For providers and/or their staff who would like to refer a patient to [facility], please contact us through our one-stop-shop provider referral line, Geraldo Smith, at [phone].    If you feel you have received this communication in error or would no longer like to receive these types of communications, please e-mail [email or web address]

## 2017-02-10 NOTE — TELEPHONE ENCOUNTER
Spoke w pt daughter; wants to know can she bring her mom in on Feb 15 th after 1:00 pm.    Please advise.

## 2017-02-10 NOTE — TELEPHONE ENCOUNTER
----- Message from Carmen Dyson sent at 2/9/2017 10:34 AM CST -----  Contact: Call Daughter, Marisol, [phone]  Marisol called requesting to speak to you concerning rescheduling Pt for any day after 1:00 pm before your next available on

## 2017-02-11 ENCOUNTER — HOSPITAL ENCOUNTER (OUTPATIENT)
Facility: HOSPITAL | Age: 82
Discharge: HOME | End: 2017-02-12
Attending: EMERGENCY MEDICINE | Admitting: HOSPITALIST
Payer: MEDICARE

## 2017-02-11 DIAGNOSIS — E87.1 HYPONATREMIA: Primary | ICD-10-CM

## 2017-02-11 DIAGNOSIS — R11.10 VOMITING: ICD-10-CM

## 2017-02-11 LAB
ALBUMIN SERPL BCP-MCNC: 3.4 G/DL
ALP SERPL-CCNC: 72 U/L
ALT SERPL W/O P-5'-P-CCNC: 22 U/L
ANION GAP SERPL CALC-SCNC: 10 MMOL/L
ANION GAP SERPL CALC-SCNC: 11 MMOL/L
ANION GAP SERPL CALC-SCNC: 11 MMOL/L
AST SERPL-CCNC: 34 U/L
BACTERIA #/AREA URNS HPF: ABNORMAL /HPF
BASOPHILS # BLD AUTO: 0 K/UL
BASOPHILS NFR BLD: 0 %
BILIRUB SERPL-MCNC: 1.2 MG/DL
BILIRUB UR QL STRIP: NEGATIVE
BUN SERPL-MCNC: 8 MG/DL
BUN SERPL-MCNC: 8 MG/DL
BUN SERPL-MCNC: 9 MG/DL
CALCIUM SERPL-MCNC: 8.7 MG/DL
CALCIUM SERPL-MCNC: 8.8 MG/DL
CALCIUM SERPL-MCNC: 9 MG/DL
CHLORIDE SERPL-SCNC: 88 MMOL/L
CHLORIDE SERPL-SCNC: 90 MMOL/L
CHLORIDE SERPL-SCNC: 93 MMOL/L
CLARITY UR: CLEAR
CO2 SERPL-SCNC: 22 MMOL/L
CO2 SERPL-SCNC: 23 MMOL/L
CO2 SERPL-SCNC: 24 MMOL/L
COLOR UR: YELLOW
CREAT SERPL-MCNC: 0.7 MG/DL
DIFFERENTIAL METHOD: ABNORMAL
EOSINOPHIL # BLD AUTO: 0 K/UL
EOSINOPHIL NFR BLD: 0 %
ERYTHROCYTE [DISTWIDTH] IN BLOOD BY AUTOMATED COUNT: 13.1 %
EST. GFR  (AFRICAN AMERICAN): >60 ML/MIN/1.73 M^2
EST. GFR  (NON AFRICAN AMERICAN): >60 ML/MIN/1.73 M^2
GLUCOSE SERPL-MCNC: 111 MG/DL
GLUCOSE SERPL-MCNC: 91 MG/DL
GLUCOSE SERPL-MCNC: 98 MG/DL
GLUCOSE UR QL STRIP: NEGATIVE
HCT VFR BLD AUTO: 36.8 %
HGB BLD-MCNC: 12.7 G/DL
HGB UR QL STRIP: ABNORMAL
HYALINE CASTS #/AREA URNS LPF: 0 /LPF
INR PPP: 1
KETONES UR QL STRIP: ABNORMAL
LEUKOCYTE ESTERASE UR QL STRIP: NEGATIVE
LYMPHOCYTES # BLD AUTO: 0.3 K/UL
LYMPHOCYTES NFR BLD: 6.3 %
MAGNESIUM SERPL-MCNC: 1.9 MG/DL
MCH RBC QN AUTO: 29.5 PG
MCHC RBC AUTO-ENTMCNC: 34.5 %
MCV RBC AUTO: 86 FL
MICROSCOPIC COMMENT: ABNORMAL
MONOCYTES # BLD AUTO: 0.2 K/UL
MONOCYTES NFR BLD: 4.2 %
NEUTROPHILS # BLD AUTO: 3.8 K/UL
NEUTROPHILS NFR BLD: 89.7 %
NITRITE UR QL STRIP: NEGATIVE
OSMOLALITY UR: 564 MOSM/KG
PH UR STRIP: 6 [PH] (ref 5–8)
PLATELET # BLD AUTO: 150 K/UL
PLATELET BLD QL SMEAR: ABNORMAL
PMV BLD AUTO: 8.6 FL
POTASSIUM SERPL-SCNC: 4 MMOL/L
POTASSIUM SERPL-SCNC: 4 MMOL/L
POTASSIUM SERPL-SCNC: 4.6 MMOL/L
PROT SERPL-MCNC: 6.3 G/DL
PROT UR QL STRIP: ABNORMAL
PROTHROMBIN TIME: 10.9 SEC
RBC # BLD AUTO: 4.3 M/UL
RBC #/AREA URNS HPF: 5 /HPF (ref 0–4)
SODIUM SERPL-SCNC: 122 MMOL/L
SODIUM SERPL-SCNC: 123 MMOL/L
SODIUM SERPL-SCNC: 127 MMOL/L
SODIUM UR-SCNC: 46 MMOL/L
SP GR UR STRIP: 1.02 (ref 1–1.03)
SQUAMOUS #/AREA URNS HPF: 2 /HPF
TROPONIN I SERPL DL<=0.01 NG/ML-MCNC: 0.01 NG/ML
URN SPEC COLLECT METH UR: ABNORMAL
UROBILINOGEN UR STRIP-ACNC: ABNORMAL EU/DL
WBC # BLD AUTO: 4.26 K/UL
WBC #/AREA URNS HPF: 2 /HPF (ref 0–5)

## 2017-02-11 PROCEDURE — G0378 HOSPITAL OBSERVATION PER HR: HCPCS

## 2017-02-11 PROCEDURE — 25000003 PHARM REV CODE 250: Performed by: NURSE PRACTITIONER

## 2017-02-11 PROCEDURE — 96361 HYDRATE IV INFUSION ADD-ON: CPT

## 2017-02-11 PROCEDURE — 63600175 PHARM REV CODE 636 W HCPCS: Performed by: EMERGENCY MEDICINE

## 2017-02-11 PROCEDURE — 80053 COMPREHEN METABOLIC PANEL: CPT

## 2017-02-11 PROCEDURE — 25000003 PHARM REV CODE 250: Performed by: EMERGENCY MEDICINE

## 2017-02-11 PROCEDURE — 83735 ASSAY OF MAGNESIUM: CPT

## 2017-02-11 PROCEDURE — 81000 URINALYSIS NONAUTO W/SCOPE: CPT

## 2017-02-11 PROCEDURE — 85610 PROTHROMBIN TIME: CPT

## 2017-02-11 PROCEDURE — 80048 BASIC METABOLIC PNL TOTAL CA: CPT | Mod: 91

## 2017-02-11 PROCEDURE — 84300 ASSAY OF URINE SODIUM: CPT

## 2017-02-11 PROCEDURE — 84484 ASSAY OF TROPONIN QUANT: CPT

## 2017-02-11 PROCEDURE — 96360 HYDRATION IV INFUSION INIT: CPT

## 2017-02-11 PROCEDURE — 85025 COMPLETE CBC W/AUTO DIFF WBC: CPT

## 2017-02-11 PROCEDURE — 99284 EMERGENCY DEPT VISIT MOD MDM: CPT | Mod: 25

## 2017-02-11 PROCEDURE — 36415 COLL VENOUS BLD VENIPUNCTURE: CPT

## 2017-02-11 PROCEDURE — 83935 ASSAY OF URINE OSMOLALITY: CPT

## 2017-02-11 RX ORDER — ACETAMINOPHEN 325 MG/1
650 TABLET ORAL
Status: COMPLETED | OUTPATIENT
Start: 2017-02-11 | End: 2017-02-11

## 2017-02-11 RX ORDER — ONDANSETRON 2 MG/ML
4 INJECTION INTRAMUSCULAR; INTRAVENOUS
Status: COMPLETED | OUTPATIENT
Start: 2017-02-11 | End: 2017-02-11

## 2017-02-11 RX ORDER — SODIUM CHLORIDE 9 MG/ML
500 INJECTION, SOLUTION INTRAVENOUS
Status: COMPLETED | OUTPATIENT
Start: 2017-02-11 | End: 2017-02-11

## 2017-02-11 RX ORDER — SODIUM CHLORIDE 9 MG/ML
INJECTION, SOLUTION INTRAVENOUS CONTINUOUS
Status: DISCONTINUED | OUTPATIENT
Start: 2017-02-11 | End: 2017-02-12 | Stop reason: HOSPADM

## 2017-02-11 RX ADMIN — SODIUM CHLORIDE 500 ML: 0.9 INJECTION, SOLUTION INTRAVENOUS at 11:02

## 2017-02-11 RX ADMIN — ONDANSETRON 4 MG: 2 INJECTION INTRAMUSCULAR; INTRAVENOUS at 11:02

## 2017-02-11 RX ADMIN — SODIUM CHLORIDE: 0.9 INJECTION, SOLUTION INTRAVENOUS at 03:02

## 2017-02-11 RX ADMIN — ACETAMINOPHEN 650 MG: 325 TABLET ORAL at 03:02

## 2017-02-11 NOTE — ED TRIAGE NOTES
"Pt complains of nausea and vomiting since starting Bactrim. Bactrim started for "infected cyst" on Wed and hasn't been able to hold down any food/drink since Thurs. Speaks Yakut  - daughter at bedside to interpret.   "

## 2017-02-11 NOTE — TELEPHONE ENCOUNTER
"    Reason for Disposition   [1] MODERATE vomiting (e.g., 3 - 5 times/day) AND [2] age > 60    Answer Assessment - Initial Assessment Questions  1. VOMITING SEVERITY: "How many times have you vomited in the past 24 hours?"      - MILD:  1 - 2 times/day     - MODERATE: 3 - 5 times/day, decreased oral intake without significant weight loss or symptoms of dehydration     - SEVERE: 6 or more times/day, vomits everything or nearly everything, with significant weight loss, symptoms of dehydration       4 times today.    2. ONSET: "When did the vomiting begin?"       Yesterday.    3. FLUIDS: "What fluids or food have you vomited up today?" "Have you been able to keep any fluids down?"      Not able to keep anything down.  Has not eaten or drank anything today.    4. ABDOMINAL PAIN: "Are your having any abdominal pain?" If yes : "How bad is it and what does it feel like?" (e.g., crampy, dull, intermittent, constant)       No     5. DIARRHEA: "Is there any diarrhea?" If so, ask: "How many times today?"       No.    6. CONTACTS: "Is there anyone else in the family with the same symptoms?"       No.    7. CAUSE: "What do you think is causing your vomiting?"      Bactrim, which was stopped yesterday.    8. HYDRATION STATUS: "Any signs of dehydration?" (e.g., dry mouth [not only dry lips], too weak to stand) "When did you last urinate?"      Last urinated a few minutes ago.  Has urinated several times today.    9. OTHER SYMPTOMS: "Do you have any other symptoms?" (e.g., fever, headache, vertigo, vomiting blood or coffee grounds)      Headache in the lower back of the head.    10. PREGNANCY: "Is there any chance you are pregnant?" "When was your last menstrual period?"        Not asked    Protocols used:  VOMITING-A-    Recommended ED now for Nany. Age 89.   Her daughter, Marisol, called to say she has vomited 4 times today, also vomited yesterday, and she is not eating or drinking anything.  Has been urinating. She had " been taking Bactrim, but Marisol said it was stopped yesterday, as that is what they fear caused her to vomit. Marisol will bring her to the ED now.  Message to Ivette Mcdaniel MD .  Please contact caller directly with any additional care advice.

## 2017-02-11 NOTE — IP AVS SNAPSHOT
[place] [address] [place] [address]  Phone: [phone]           Instrucciones de [place] de Pacientes    Nuestro objetivo es que te prepara para el éxito. Rosalia paquete incluye información sobre flores enfermedad, medicamentos y atención médica a domicilio. [place] le ayudará a cuidar y que no se enferman más y necesita volver al hospital.     Por favor, pregunte a flores enfermera si tiene alguna pregunta.       Hay muchos detalles a recordar cuando se prepara para salir del hospital. [place] es lo que necesita hacer:    1. [place] flores medicina. Si usted tiene rey receta, revise la lista de medicamentos en las siguientes páginas. Es posible que tenga nuevos medicamentos para recoger en la farmacia y otros que tendrá que dejar de brendon. Revise las instrucciones sobre cómo y cuándo brendon lien medicamentos. Consulte con el médico o el enfermeras si no está seguro de qué hacer.    2. Ir a lien citas de seguimiento. La información específica de seguimiento aparece en las siguientes páginas. Usted puede ser contactado por rey enfermera o proveedor clínico sobre las próximas citas. Estar seguro de que tiene todos los números de teléfono para comunicarse si es necesario. Por favor, póngase en contacto con la oficina de flores profesional médico si no puede hacer rey bety.     3. Esté atento a señales de advertencia. El médico o la enfermera le dará señales de alarma detallados que debe observar y cuándo llamar para la ayuda. Estas instrucciones también pueden incluir información educativa acerca de flores condición. Si usted experimenta cualquiera de las señales de advertencia para flores hugo, llame a flores médico.             [facility]    Si flores médico no le ha indicado a lo contrário, por favor   contactar a [facility], nuestra línea de cuidado de enfermeras está disponible para asistirle 24/7.    [phone] ([facility]icio [facility])    Enfermeras registradas de [facility] pueden ayudarle a reservar rey  bety, proveer educación para la hugo, asesoría clínica, y otros servicios de asesoramiento.                  ** Verificar la lista de medicamentos es correcta y está actualizada. Llevar esto con usted en salome de emergencia. Si lien medicamentos pastrana cambiado, por favor notifique a flores proveedor de atención médica.             Lista de medicamentos      SIGA tomando estos medicamentos        Additional Info                      apixaban 5 mg Tab   Cantidad:  60 tablet   Recargas:  4   Dosis:  5 mg    Instrucciones:  Take 1 tablet (5 mg total) by mouth 2 (two) times daily.     Begin Date    AM    Noon    PM    Bedtime       bimatoprost 0.03 % ophthalmic drops   También conocido kasey:  LUMIGAN   Recargas:  0   Dosis:  1 drop    Instrucciones:  1 drop every evening.     Begin Date    AM    Noon    PM    Bedtime       ezetimibe-simvastatin 10-40 mg 10-40 mg per tablet   También conocido kasey:  VYTORIN 10-40   Cantidad:  90 tablet   Recargas:  3   Dosis:  1 tablet    Instrucciones:  Take 1 tablet by mouth every evening.     Begin Date    AM    Noon    PM    Bedtime       levothyroxine 50 MCG tablet   También conocido kasey:  SYNTHROID   Cantidad:  90 tablet   Recargas:  1    Instrucciones:  TAKE 1 TABLET BY MOUTH EVERY DAY     Begin Date    AM    Noon    PM    Bedtime       lisinopril 10 MG tablet   Cantidad:  135 tablet   Recargas:  4    Instrucciones:  TAKE 1 AND 1/2 TABLETS BY MOUTH EVERY DAY     Begin Date    AM    Noon    PM    Bedtime       metoprolol tartrate 25 MG tablet   También conocido kasey:  LOPRESSOR   Cantidad:  180 tablet   Recargas:  1    Instrucciones:  TAKE 1 TABLET BY MOUTH TWICE DAILY     Begin Date    AM    Noon    PM    Bedtime         DEJE de brendon estos medicamentos     hydrochlorothiazide 12.5 mg capsule   También conocido kasey:  MICROZIDE       simvastatin 40 MG tablet   También conocido kasey:  ZOCOR                  Por favor traiga a todos las citas de seguimiento:    1. Christin copia de las  instrucciones de marcos.  2. Todos los medicamentos que está tomando yuly momento, en shari envases originales.  3. Identificación y tarjeta de seguro.    Por favor llegue 15 minutos antes de la hora de la bety.    Por favor llame con 24 horas de antelación si tiene que cambiar wright bety y / o tiempo.        Shari Citas Programadas     Feb 16, 2017  9:30 AM CST   Removals with Joshua Goldberg, MD   [facility] - General Surgery ([facility] )    [address] [place] [id number] [phone]            Mar 03, 2017  3:00 PM CST   Follow Up/Office Visit with Ivette Mcdaniel MD   [facility] - Internal Medicine ([facility])    [address] [place] [address]   [phone]            Apr 04, 2017  4:15 PM CDT   Us Extremity Veins with [facility] USVAS1   [facility] ([facility])    [address] [facility] [place] [address] [phone]            Apr 11, 2017  3:00 PM CDT   Established Patient Visit with Santos Noe MD   [facility] - Cardiology [facility])    [address] [facility] [address]  [place] [id number] [phone]                Instrucciones de marcos     [place]    BASIC METABOLIC PANEL     Activity as tolerated     Call MD for:  increased confusion or weakness     Diet general     Preguntas:    Total calories:      Fat restriction, if any:      Protein restriction, if any:      Na restriction, if any:      Fluid restriction:      Additional restrictions:  Regular        Primary Diagnosis     Wright diagnosis primaria es:  Low Sodium Levels      Información de Admisión     Fecha y hora Proveedor Departamento CSN    2/11/2017 11:22 AM Joey Mane MD [facility] [id number]      Los proveedores de cuidado     Personal Médico Rol Especialidad Teléfono principal de la oficina    Joey Mane MD Attending Provider Hospitalist [phone]      Shari signos vitales lionel     PS Pulso Temperatura Resp  "Macon Peso    131/61 (BP Location: Right arm, Patient Position: Lying, BP Method: Automatic) 107 98.5 °F (36.9 °C) (Oral) 18 4' 10" (1.473 m) 52.6 kg (116 lb 1 oz)    Ultima menstruación SpO2 BMI (IMC)             (LMP Unknown) 95% 24.26 kg/m2         Recent Lab Values        1/20/2005                           8:35 AM           A1C 5.6                       Pending Labs     Order Current Status    Urine culture In process      Alergias     A partir del:  2/12/2017           Reacciones    Bactrim [Sulfamethoxazole-trimethoprim] Nausea And Vomiting      Directiva Anticipada     Christin directiva anticipada es un documento que, en salome de que ya no capaz de hacer decisiones por sí mismo, le dice a flores equipo médico qué tipo de tratamiento quiere o no quiere recibir, o que le gustaría brendon esas decisiones para usted . Si actualmente no tiene christin directiva anticipada, [facility] le anima a crear chelle. Para más información llame al: [phone]), o entrando en [email or web address].        Language Assistance Services     ATTENTION: Language assistance services are available, free of charge. Please call [phone].      ATENCIÓN: Si habla español, tiene a flores disposición servicios gratuitos de asistencia lingüística. Llame al [phone].     [facility] Ý: N?u b?n nói Ti?ng Vi?t, có các d?ch v? h? tr? ngôn ng? mi?n phí dành cho b?n. G?i s? [phone].        Eliquis Informaiton [facility] cumple con las leyes federales aplicables de derechos civiles y no discrimina por motivos de jim, color, origen nacional, edad, discapacidad, o sexo.                        [address] [place] [address]  Phone: [phone]           Patient Discharge Instructions     Our goal is to set you up for success. This packet includes information on your condition, medications, and your home care. It will help you to care for yourself " so you don't get sicker and need to go back to the hospital.     Please ask your nurse if you have any questions.        There are many details to remember when preparing to leave the hospital. Here is what you will need to do:    1. Take your medicine. If you are prescribed medications, review your Medication List in the following pages. You may have new medications to  at the pharmacy and others that you'll need to stop taking. Review the instructions for how and when to take your medications. Talk with your doctor or nurses if you are unsure of what to do.     2. Go to your follow-up appointments. Specific follow-up information is listed in the following pages. Your may be contacted by a transition nurse or clinical provider about future appointments. Be sure we have all of the phone numbers to reach you, if needed. Please contact your provider's office if you are unable to make an appointment.     3. Watch for warning signs. Your doctor or nurse will give you detailed warning signs to watch for and when to call for assistance. These instructions may also include educational information about your condition. If you experience any of warning signs to your health, call your doctor.               [facility]  Unless otherwise directed by your provider, please contact [facility], our nurse care line that is available for 24/7 assistance.     [phone] (toll-free)    Registered nurses in the [facility] provide clinical advisement, health education, appointment booking, and other advisory services.                ** Verificar la lista de medicamentos es correcta y está actualizada. Llevar esto con usted en salome de emergencia. Si lien medicamentos pastrana cambiado, por favor notifique a flores proveedor de atención médica.             Medication List      CONTINUE taking these medications        Additional Info                      apixaban 5 mg Tab   Quantity:  60 tablet   Refills:  4   Dose:  5  mg    Instructions:  Take 1 tablet (5 mg total) by mouth 2 (two) times daily.     Begin Date    AM    Noon    PM    Bedtime       bimatoprost 0.03 % ophthalmic drops   Commonly known as:  LUMIGAN   Refills:  0   Dose:  1 drop    Instructions:  1 drop every evening.     Begin Date    AM    Noon    PM    Bedtime       ezetimibe-simvastatin 10-40 mg 10-40 mg per tablet   Commonly known as:  VYTORIN 10-40   Quantity:  90 tablet   Refills:  3   Dose:  1 tablet    Instructions:  Take 1 tablet by mouth every evening.     Begin Date    AM    Noon    PM    Bedtime       levothyroxine 50 MCG tablet   Commonly known as:  SYNTHROID   Quantity:  90 tablet   Refills:  1    Instructions:  TAKE 1 TABLET BY MOUTH EVERY DAY     Begin Date    AM    Noon    PM    Bedtime       lisinopril 10 MG tablet   Quantity:  135 tablet   Refills:  4    Instructions:  TAKE 1 AND 1/2 TABLETS BY MOUTH EVERY DAY     Begin Date    AM    Noon    PM    Bedtime       metoprolol tartrate 25 MG tablet   Commonly known as:  LOPRESSOR   Quantity:  180 tablet   Refills:  1    Instructions:  TAKE 1 TABLET BY MOUTH TWICE DAILY     Begin Date    AM    Noon    PM    Bedtime         STOP taking these medications     hydrochlorothiazide 12.5 mg capsule   Commonly known as:  MICROZIDE       simvastatin 40 MG tablet   Commonly known as:  ZOCOR                  Por favor traiga a todos las citas de seguimiento:    1. Christin copia de las instrucciones de marcos.  2. Todos los medicamentos que está tomando yuly momento, en lien envases originales.  3. Identificación y tarjeta de seguro.    Por favor llegue 15 minutos antes de la hora de la bety.    Por favor llame con 24 horas de antelación si tiene que cambiar flores bety y / o tiempo.        Your Scheduled Appointments     Feb 16, 2017  9:30 AM CST   Removals with Joshua Goldberg, MD [address] - General Surgery ([facility] )    [address]  [place] [address]   [phone]            Mar 03, 2017  3:00 PM CST  "  Follow Up/Office Visit with MD Natanael Ross - Internal Medicine ([facility])    [address]  [place] [id number] [phone]            Apr 04, 2017  4:15 PM CDT   Us Extremity Veins with [facility] USVAS1   [facility])    [address] [place] [address] [phone]            Apr 11, 2017  3:00 PM CDT   Established Patient Visit with Santos Noe MD   [facility] - Cardiology [facility])    [address] [facility] [address] [place] [address] [phone]                Discharge Instructions     Future Orders    BASIC METABOLIC PANEL     Activity as tolerated     Call MD for:  increased confusion or weakness     Diet general     Questions:    Total calories:      Fat restriction, if any:      Protein restriction, if any:      Na restriction, if any:      Fluid restriction:      Additional restrictions:  Regular        Primary Diagnosis     Your primary diagnosis was:  Low Sodium Levels      Admission Information     Date & Time Provider Department CSN    2/11/2017 11:22 AM Joey Mane MD [facility] [id number]      Care Providers     Provider Role Specialty Primary office phone    Joey Mane MD Attending Provider Hospitalist [phone]      Your Vitals Were     BP Pulse Temp Resp Height Weight    131/61 (BP Location: Right arm, Patient Position: Lying, BP Method: Automatic) 107 98.5 °F (36.9 °C) (Oral) 18 4' 10" (1.473 m) 52.6 kg (116 lb 1 oz)    Last Period SpO2 BMI             (LMP Unknown) 95% 24.26 kg/m2         Recent Lab Values        1/20/2005                           8:35 AM           A1C 5.6                       Pending Labs     Order Current Status    Urine culture In process      Allergies as of 2/12/2017        Reactions    Bactrim [Sulfamethoxazole-trimethoprim] Nausea And Vomiting      Advance Directives     An advance directive is a document " which, in the event you are no longer able to make decisions for yourself, tells your healthcare team what kind of treatment you do or do not want to receive, or who you would like to make those decisions for you.  If you do not currently have an advance directive, [facility] encourages you to create one.  For more information call:  [phone]), [phone] ([phone]),  or log on to [email or web address].        Language Assistance Services     ATTENTION: Language assistance services are available, free of charge. Please call [phone].      ATENCIÓN: Si habla español, tiene a flores disposición servicios gratuitos de asistencia lingüística. Llame al [phone].     CHÚ Ý: N?u b?n nói Ti?ng Vi?t, có các d?ch v? h? tr? ngôn ng? mi?n phí dành cho b?n. G?i s? [phone].        Eliquis Informaiton            [facility] complies with applicable Federal civil rights laws and does not discriminate on the basis of race, color, national origin, age, disability, or sex.

## 2017-02-11 NOTE — ED PROVIDER NOTES
"Encounter Date: 2017       History     Chief Complaint   Patient presents with    Medication Reaction     pt's daughter states " she's been throwing up and can't keep anything down"" she had a bad reaction to an antibiotics" " she hasn't eaten anything since thursday" present to the ER with pt, reports adverse reaction to bactrim      Review of patient's allergies indicates:   Allergen Reactions    Bactrim [sulfamethoxazole-trimethoprim] Nausea And Vomiting     HPI Comments: 89-year-old female with a history of hypertension, hypothyroidism, coronary artery disease presents with vomiting since starting Bactrim on Tuesday.  Last dose of Bactrim was Thursday morning but vomiting persists.  She said lack of appetite and fatigue.  She was on Bactrim for infected sebaceous cyst.  She was evaluated yesterday by surgery and was scheduled for surgery next week.  No fever, chest pain, shortness of breath.  Last bowel movement was yesterday.  Symptoms are intermittent and moderate.    Past Medical History   Diagnosis Date    Colon polyp      no further colon needed    Coronary artery disease     Diverticulosis     Fracture of right distal radius     GERD (gastroesophageal reflux disease)     Hypertension     Hypothyroidism (acquired)     Osteoporosis, post-menopausal     Scoliosis      Past Medical History Pertinent Negatives   Diagnosis Date Noted    Amblyopia 2013    Arthritis 2013    Cataract 2013    Diabetes mellitus 2013    Diabetic retinopathy 2013    Glaucoma 2013    Macular degeneration 2013    Retinal detachment 2013    Sickle cell anemia 2015    Sickle cell trait 2015    Strabismus 2013    Uveitis 2013     Past Surgical History   Procedure Laterality Date    Cataract extraction       ou    Yag cap       od    Hysterectomy      Adenoidectomy       section      Cholecystectomy      Eye surgery  2014     ptosis surgery "       Family History   Problem Relation Age of Onset    Cancer Mother      skin cancer     Dementia Mother     Hypertension Mother     Heart disease Brother     Cancer Brother      lung cancer     No Known Problems Father     No Known Problems Sister     No Known Problems Maternal Aunt     No Known Problems Maternal Uncle     No Known Problems Paternal Aunt     No Known Problems Paternal Uncle     No Known Problems Maternal Grandmother     No Known Problems Maternal Grandfather     No Known Problems Paternal Grandmother     No Known Problems Paternal Grandfather     Amblyopia Neg Hx     Blindness Neg Hx     Cataracts Neg Hx     Diabetes Neg Hx     Glaucoma Neg Hx     Macular degeneration Neg Hx     Retinal detachment Neg Hx     Strabismus Neg Hx     Stroke Neg Hx     Thyroid disease Neg Hx      Social History   Substance Use Topics    Smoking status: Never Smoker    Smokeless tobacco: None    Alcohol use 3.6 oz/week     6 Glasses of wine per week      Comment: occasional /social     Review of Systems   Constitutional: Positive for appetite change and fatigue. Negative for fever.   Eyes: Negative for pain.   Respiratory: Negative for shortness of breath.    Cardiovascular: Negative for chest pain.   Gastrointestinal: Positive for vomiting. Negative for abdominal pain and nausea.   Genitourinary: Negative for difficulty urinating.   Musculoskeletal: Negative for back pain and neck pain.   Neurological: Negative for headaches.   Psychiatric/Behavioral: Negative for confusion.       Physical Exam   Initial Vitals   BP Pulse Resp Temp SpO2   02/11/17 1120 02/11/17 1120 02/11/17 1120 02/11/17 1120 02/11/17 1120   122/56 74 20 98.1 °F (36.7 °C) 98 %     Physical Exam    Nursing note and vitals reviewed.  HENT:   Head: Atraumatic.   Eyes: Conjunctivae and EOM are normal.   Neck: Normal range of motion.   Cardiovascular: Exam reveals no gallop and no friction rub.    No murmur  heard.  Pulmonary/Chest: Breath sounds normal. No respiratory distress.   Abdominal: Soft. She exhibits no distension. There is no tenderness. There is no rebound.   Musculoskeletal: Normal range of motion. She exhibits no edema.   Neurological: She is alert and oriented to person, place, and time.   Skin:   Draining induration in the pelvic region with minimal surrounding erythema   Psychiatric: She has a normal mood and affect.         ED Course   Critical Care  Date/Time: 2/11/2017 3:02 PM  Performed by: DAMON RODRIGUEZ.  Authorized by: DAMON RODRIGUEZ   Direct patient critical care time: 29 minutes  Ordering / reviewing critical care time: 10 minutes  Documentation critical care time: 10 minutes  Consulting other physicians critical care time: 5 minutes  Total critical care time (exclusive of procedural time) : 54 minutes  Critical care was necessary to treat or prevent imminent or life-threatening deterioration of the following conditions: cardiac failure, circulatory failure, CNS failure or compromise, respiratory failure, endocrine crisis, metabolic crisis, renal failure and dehydration.  Critical care was time spent personally by me on the following activities: development of treatment plan with patient or surrogate, discussions with consultants, evaluation of patient's response to treatment, ordering and performing treatments and interventions, examination of patient, obtaining history from patient or surrogate, ordering and review of laboratory studies, ordering and review of radiographic studies, re-evaluation of patient's condition, pulse oximetry and review of old charts.        Labs Reviewed   CBC W/ AUTO DIFFERENTIAL - Abnormal; Notable for the following:        Result Value    Hematocrit 36.8 (*)     MPV 8.6 (*)     Lymph # 0.3 (*)     Mono # 0.2 (*)     Gran% 89.7 (*)     Lymph% 6.3 (*)     All other components within normal limits   COMPREHENSIVE METABOLIC PANEL - Abnormal; Notable for the  following:     Sodium 122 (*)     Chloride 88 (*)     Glucose 111 (*)     Albumin 3.4 (*)     Total Bilirubin 1.2 (*)     All other components within normal limits   URINALYSIS - Abnormal; Notable for the following:     Protein, UA 1+ (*)     Ketones, UA 2+ (*)     Occult Blood UA 2+ (*)     Urobilinogen, UA 4.0-6.0 (*)     All other components within normal limits   URINALYSIS MICROSCOPIC - Abnormal; Notable for the following:     RBC, UA 5 (*)     All other components within normal limits   MAGNESIUM   PROTIME-INR   TROPONIN I   OSMOLALITY   BASIC METABOLIC PANEL   SODIUM, URINE, RANDOM   OSMOLALITY, URINE RANDOM             Medical Decision Making:   Initial Assessment:   89-year-old female with a history of coronary artery disease and hypothyroidism presents with vomiting since Tuesday after being put on Bactrim for an infected sebaceous cyst.  She is not taking the medicine since Thursday.  On exam her vital signs unremarkable.  She appears stated age.  She has a benign abdominal exam.  The sebaceous cyst appears to be draining.  No significant erythema surrounding this.  EKG reviewed and interpreted myself shows a left bundle-branch block.  This is not new compared to EKGs done 10 years ago.  Labs notable for sodium of 122.  Glucose within normal limits.  Renal function unremarkable.  Given the patient's history of vomiting, I suspect that she has a hypovolemic hyponatremia.  Plan to admit for IV fluids.                   ED Course     Clinical Impression:   The primary encounter diagnosis was Hyponatremia. A diagnosis of Vomiting was also pertinent to this visit.          Wm Milner MD  02/11/17 2679

## 2017-02-11 NOTE — ED NOTES
Pt aware that we need a urine sample. States she can't go right now but will call me. Will continue to check w her.

## 2017-02-12 VITALS
HEIGHT: 58 IN | RESPIRATION RATE: 18 BRPM | OXYGEN SATURATION: 95 % | DIASTOLIC BLOOD PRESSURE: 61 MMHG | HEART RATE: 96 BPM | TEMPERATURE: 99 F | BODY MASS INDEX: 24.36 KG/M2 | WEIGHT: 116.06 LBS | SYSTOLIC BLOOD PRESSURE: 131 MMHG

## 2017-02-12 LAB
ANION GAP SERPL CALC-SCNC: 8 MMOL/L
ANION GAP SERPL CALC-SCNC: 9 MMOL/L
BUN SERPL-MCNC: 8 MG/DL
BUN SERPL-MCNC: 8 MG/DL
CALCIUM SERPL-MCNC: 7.9 MG/DL
CALCIUM SERPL-MCNC: 8.4 MG/DL
CHLORIDE SERPL-SCNC: 98 MMOL/L
CHLORIDE SERPL-SCNC: 99 MMOL/L
CO2 SERPL-SCNC: 22 MMOL/L
CO2 SERPL-SCNC: 23 MMOL/L
CREAT SERPL-MCNC: 0.6 MG/DL
CREAT SERPL-MCNC: 0.7 MG/DL
EST. GFR  (AFRICAN AMERICAN): >60 ML/MIN/1.73 M^2
EST. GFR  (AFRICAN AMERICAN): >60 ML/MIN/1.73 M^2
EST. GFR  (NON AFRICAN AMERICAN): >60 ML/MIN/1.73 M^2
EST. GFR  (NON AFRICAN AMERICAN): >60 ML/MIN/1.73 M^2
GLUCOSE SERPL-MCNC: 76 MG/DL
GLUCOSE SERPL-MCNC: 98 MG/DL
POTASSIUM SERPL-SCNC: 3.7 MMOL/L
POTASSIUM SERPL-SCNC: 4.4 MMOL/L
SODIUM SERPL-SCNC: 129 MMOL/L
SODIUM SERPL-SCNC: 130 MMOL/L

## 2017-02-12 PROCEDURE — 36415 COLL VENOUS BLD VENIPUNCTURE: CPT

## 2017-02-12 PROCEDURE — 80048 BASIC METABOLIC PNL TOTAL CA: CPT | Mod: 91

## 2017-02-12 PROCEDURE — 25000003 PHARM REV CODE 250: Performed by: NURSE PRACTITIONER

## 2017-02-12 PROCEDURE — 87086 URINE CULTURE/COLONY COUNT: CPT

## 2017-02-12 PROCEDURE — 96374 THER/PROPH/DIAG INJ IV PUSH: CPT

## 2017-02-12 PROCEDURE — G0378 HOSPITAL OBSERVATION PER HR: HCPCS

## 2017-02-12 PROCEDURE — 96375 TX/PRO/DX INJ NEW DRUG ADDON: CPT

## 2017-02-12 PROCEDURE — 96365 THER/PROPH/DIAG IV INF INIT: CPT

## 2017-02-12 RX ADMIN — SODIUM CHLORIDE: 0.9 INJECTION, SOLUTION INTRAVENOUS at 06:02

## 2017-02-12 NOTE — NURSING
Report received from KURTIS Kincaid. Pt. Care assumed. Pt. Arrived back to unit from scheduled testing accompanied by family. Pt. Is AAOx4. Pt. Has telemetry monitor and IV fluids in place. Pt. Has no complaints of pain and no signs of respiratory distress noted. PT. Has call light in reach and was instructed to inform the nurse if anything is needed.

## 2017-02-12 NOTE — NURSING
Lab notified that BMP was never drawn. Was informed someone would be on the unit to collect. Pt. Stable. Will continue to monitor.

## 2017-02-12 NOTE — SUBJECTIVE & OBJECTIVE
Past Medical History   Diagnosis Date    Colon polyp      no further colon needed    Coronary artery disease     Diverticulosis     Fracture of right distal radius     GERD (gastroesophageal reflux disease)     Hypertension     Hypothyroidism (acquired)     Osteoporosis, post-menopausal     Scoliosis        Past Surgical History   Procedure Laterality Date    Cataract extraction       ou    Yag cap       od    Hysterectomy      Adenoidectomy       section      Cholecystectomy      Eye surgery  2014     ptosis surgery         Review of patient's allergies indicates:   Allergen Reactions    Bactrim [sulfamethoxazole-trimethoprim] Nausea And Vomiting       No current facility-administered medications on file prior to encounter.      Current Outpatient Prescriptions on File Prior to Encounter   Medication Sig    apixaban 5 mg Tab Take 1 tablet (5 mg total) by mouth 2 (two) times daily.    bimatoprost (LUMIGAN) 0.03 % ophthalmic drops 1 drop every evening.    ezetimibe-simvastatin 10-40 mg (VYTORIN 10-40) 10-40 mg per tablet Take 1 tablet by mouth every evening.    hydrochlorothiazide (MICROZIDE) 12.5 mg capsule Take 1 capsule (12.5 mg total) by mouth once daily.    levothyroxine (SYNTHROID) 50 MCG tablet TAKE 1 TABLET BY MOUTH EVERY DAY    lisinopril 10 MG tablet TAKE 1 AND 1/2 TABLETS BY MOUTH EVERY DAY    metoprolol tartrate (LOPRESSOR) 25 MG tablet TAKE 1 TABLET BY MOUTH TWICE DAILY    simvastatin (ZOCOR) 40 MG tablet Take 1 tablet (40 mg total) by mouth every evening.    [DISCONTINUED] doxycycline (MONODOX) 100 MG capsule Take 1 capsule (100 mg total) by mouth 2 (two) times daily.     Family History     Problem Relation (Age of Onset)    Cancer Mother, Brother    Dementia Mother    Heart disease Brother    Hypertension Mother    No Known Problems Father, Sister, Maternal Aunt, Maternal Uncle, Paternal Aunt, Paternal Uncle, Maternal Grandmother, Maternal Grandfather, Paternal  Grandmother, Paternal Grandfather        Social History Main Topics    Smoking status: Never Smoker    Smokeless tobacco: Not on file    Alcohol use 3.6 oz/week     6 Glasses of wine per week      Comment: occasional /social    Drug use: No    Sexual activity: Yes     Partners: Male     Review of Systems   Constitutional: Positive for activity change, appetite change and fatigue. Negative for fever.   HENT: Negative.    Eyes: Negative.    Respiratory: Negative.    Cardiovascular: Negative.    Gastrointestinal: Positive for abdominal pain (sore after vomiting. ), nausea and vomiting.   Genitourinary: Negative.    Musculoskeletal: Negative.    Skin: Negative.    Hematological: Bruises/bleeds easily.   Psychiatric/Behavioral: Negative.      Objective:     Vital Signs (Most Recent):  Temp: 98.4 °F (36.9 °C) (02/11/17 1727)  Pulse: 70 (02/11/17 1727)  Resp: 17 (02/11/17 1727)  BP: 137/62 (02/11/17 1727)  SpO2: 95 % (02/11/17 1727) Vital Signs (24h Range):  Temp:  [98.1 °F (36.7 °C)-98.4 °F (36.9 °C)] 98.4 °F (36.9 °C)  Pulse:  [68-76] 70  Resp:  [17-20] 17  SpO2:  [93 %-98 %] 95 %  BP: (120-159)/(56-67) 137/62     Weight: 52.6 kg (116 lb 1 oz)  Body mass index is 24.26 kg/(m^2).    Physical Exam   Constitutional: She is oriented to person, place, and time. She appears well-developed and well-nourished.   HENT:   Head: Normocephalic and atraumatic.   Eyes: Conjunctivae and EOM are normal.   Neck: Normal range of motion.   Cardiovascular: Normal rate and regular rhythm.    No murmur heard.  Pulmonary/Chest: Effort normal and breath sounds normal.   Abdominal: Soft. Bowel sounds are normal. She exhibits no distension. There is no tenderness.   Neurological: She is alert and oriented to person, place, and time.   Skin: Skin is warm and dry.   Psychiatric: She has a normal mood and affect.        Significant Labs: All pertinent labs within the past 24 hours have been reviewed.    Significant Imaging: I have reviewed and  interpreted all pertinent imaging results/findings within the past 24 hours.

## 2017-02-12 NOTE — ASSESSMENT & PLAN NOTE
Unclear if related to bactrim as stopped on 2/8. T bili 1.2 otherwise normal LFT's. Obtain US abdomen.

## 2017-02-12 NOTE — H&P
"[facility] Hospital Medicine  History & Physical    Patient Name: Nany Crenshaw  MRN: [id number]  Admission Date: 2017  Attending Physician: Dr. Boo  Primary Care Provider: Ivette Mcdaniel MD         Patient information was obtained from Daughter Marisol    Subjective:     Principal Problem:Hyponatremia    Chief Complaint:   Chief Complaint   Patient presents with    Medication Reaction     pt's daughter states " she's been throwing up and can't keep anything down"" she had a bad reaction to an antibiotics" " she hasn't eaten anything since thursday" present to the ER with pt, reports adverse reaction to bactrim         HPI: 88 yo [place]ankan speaking female with history for hypothyroidism, recent 6 months ago right leg DVT (plane trip) on eliquis, hypertension and hyperlipidemia who presents to hospital for nausea and vomiting since Thursday (3 days ago). Patient contributes to Bactrim prescribed for suprapubic cyst on Wednesday (4 days ago). Patient then prescribed doxycycline however did not get them filled. She saw Surgery yesterday who told patient not to take further antibiotics. Symptoms occurs with solid, fluid intake and medications. Patient unable to take pedialytes or gatorade. Reports abdominal soreness after vomiting. Denies headaches, dizziness or confusion.     KUB no obstruction. Labs showed hyponatremia .       Past Medical History   Diagnosis Date    Colon polyp      no further colon needed    Coronary artery disease     Diverticulosis     Fracture of right distal radius     GERD (gastroesophageal reflux disease)     Hypertension     Hypothyroidism (acquired)     Osteoporosis, post-menopausal     Scoliosis        Past Surgical History   Procedure Laterality Date    Cataract extraction       ou    Yag cap       od    Hysterectomy      Adenoidectomy       section      Cholecystectomy      Eye surgery  2014     ptosis surgery         Review of " patient's allergies indicates:   Allergen Reactions    Bactrim [sulfamethoxazole-trimethoprim] Nausea And Vomiting       No current facility-administered medications on file prior to encounter.      Current Outpatient Prescriptions on File Prior to Encounter   Medication Sig    apixaban 5 mg Tab Take 1 tablet (5 mg total) by mouth 2 (two) times daily.    bimatoprost (LUMIGAN) 0.03 % ophthalmic drops 1 drop every evening.    ezetimibe-simvastatin 10-40 mg (VYTORIN 10-40) 10-40 mg per tablet Take 1 tablet by mouth every evening.    hydrochlorothiazide (MICROZIDE) 12.5 mg capsule Take 1 capsule (12.5 mg total) by mouth once daily.    levothyroxine (SYNTHROID) 50 MCG tablet TAKE 1 TABLET BY MOUTH EVERY DAY    lisinopril 10 MG tablet TAKE 1 AND 1/2 TABLETS BY MOUTH EVERY DAY    metoprolol tartrate (LOPRESSOR) 25 MG tablet TAKE 1 TABLET BY MOUTH TWICE DAILY    simvastatin (ZOCOR) 40 MG tablet Take 1 tablet (40 mg total) by mouth every evening.    [DISCONTINUED] doxycycline (MONODOX) 100 MG capsule Take 1 capsule (100 mg total) by mouth 2 (two) times daily.     Family History     Problem Relation (Age of Onset)    Cancer Mother, Brother    Dementia Mother    Heart disease Brother    Hypertension Mother    No Known Problems Father, Sister, Maternal Aunt, Maternal Uncle, Paternal Aunt, Paternal Uncle, Maternal Grandmother, Maternal Grandfather, Paternal Grandmother, Paternal Grandfather        Social History Main Topics    Smoking status: Never Smoker    Smokeless tobacco: Not on file    Alcohol use 3.6 oz/week     6 Glasses of wine per week      Comment: occasional /social    Drug use: No    Sexual activity: Yes     Partners: Male     Review of Systems   Constitutional: Positive for activity change, appetite change and fatigue. Negative for fever.   HENT: Negative.    Eyes: Negative.    Respiratory: Negative.    Cardiovascular: Negative.    Gastrointestinal: Positive for abdominal pain (sore after vomiting.  ), nausea and vomiting.   Genitourinary: Negative.    Musculoskeletal: Negative.    Skin: Negative.    Hematological: Bruises/bleeds easily.   Psychiatric/Behavioral: Negative.      Objective:     Vital Signs (Most Recent):  Temp: 98.4 °F (36.9 °C) (02/11/17 1727)  Pulse: 70 (02/11/17 1727)  Resp: 17 (02/11/17 1727)  BP: 137/62 (02/11/17 1727)  SpO2: 95 % (02/11/17 1727) Vital Signs (24h Range):  Temp:  [98.1 °F (36.7 °C)-98.4 °F (36.9 °C)] 98.4 °F (36.9 °C)  Pulse:  [68-76] 70  Resp:  [17-20] 17  SpO2:  [93 %-98 %] 95 %  BP: (120-159)/(56-67) 137/62     Weight: 52.6 kg (116 lb 1 oz)  Body mass index is 24.26 kg/(m^2).    Physical Exam   Constitutional: She is oriented to person, place, and time. She appears well-developed and well-nourished.   HENT:   Head: Normocephalic and atraumatic.   Eyes: Conjunctivae and EOM are normal.   Neck: Normal range of motion.   Cardiovascular: Normal rate and regular rhythm.    No murmur heard.  Pulmonary/Chest: Effort normal and breath sounds normal.   Abdominal: Soft. Bowel sounds are normal. She exhibits no distension. There is no tenderness.   Neurological: She is alert and oriented to person, place, and time.   Skin: Skin is warm and dry.   Psychiatric: She has a normal mood and affect.        Significant Labs: All pertinent labs within the past 24 hours have been reviewed.    Significant Imaging: I have reviewed and interpreted all pertinent imaging results/findings within the past 24 hours.    Assessment/Plan:     * Hyponatremia  Asymptomatic. Due to NV and poor intake. Obtain SOsm, UOsm,David. NS 100ml/hr. BMP every 4 q. Seizure precaution, Neuro cks.       Benign hypertension  Continue home lisinopril. Hold hctz.       GERD (gastroesophageal reflux disease)        Hypothyroidism (acquired)  Lab Results   Component Value Date    TSH 1.662 02/07/2017   Continue synthyroid.        Right leg DVT  Continue eliquis.       Vomiting  Unclear if related to bactrim as stopped on 2/8. T  bili 1.2 otherwise normal LFT's. Obtain US abdomen.       Abdominal cyst  Suprapubic cyst with no sign of infections. Plan for removal by Surgery next week.       VTE Risk Mitigation     None        Veronique Valle NP  Department of Hospital Medicine   [facility]

## 2017-02-12 NOTE — ASSESSMENT & PLAN NOTE
Asymptomatic. Due to NV and poor intake. Obtain SOsm, UOsm,David. NS 100ml/hr. BMP every 4 q. Seizure precaution, Neuro cks.

## 2017-02-12 NOTE — PLAN OF CARE
Problem: Fall Risk (Adult)  Intervention: Monitor/Assist with Self Care    17   Functional Level Current   Ambulation --  --  2 - assistive person   Transferring --  --  2 - assistive person   Toileting --  --  2 - assistive person   Bathing --  --  3 - assistive equipment and person   Dressing --  --  2 - assistive person   Eating --  --  0 - independent   Communication --  --  0 - understands/communicates without difficulty   Swallowing --  --  0 - swallows foods/liquids without difficulty   Daily Care Interventions   Self-Care Promotion independence encouraged --  --    Activity   Activity Assistance Provided --  assistance, 1 person --        Intervention: Reduce Risk/Promote Restraint Free Environment    17   Safety Interventions   Environmental Safety Modification clutter free environment maintained;lighting adjusted;mobility aid in reach;room near unit station;room organization consistent   Prevent [place] Drop/Fall   Safety/Security Measures family to remain at bedside       Intervention: Review Medications/Identify Contributors to Fall Risk    17   Safety Interventions   Medication Review/Management medications reviewed       Intervention: Patient Rounds    17   Safety Interventions   Patient Rounds bed in low position;bed wheels locked;call light in reach;clutter free environment maintained;ID band on;placement of personal items at bedside;toileting offered;visualized patient       Intervention: Safety Promotion/Fall Prevention    17   Safety Interventions   Safety Promotion/Fall Prevention family to remain at bedside;lighting adjusted;medications reviewed         Goal: Identify Related Risk Factors and Signs and Symptoms  Related risk factors and signs and symptoms are identified upon initiation of Human Response Clinical Practice Guideline (CPG)     17   Fall Risk   Related Risk Factors (Fall Risk) age-related  changes;fatigue/slow reaction;gait/mobility problems;environment unfamiliar   Signs and Symptoms (Fall Risk) presence of risk factors       Goal: Absence of Falls  Patient will demonstrate the desired outcomes by discharge/transition of care.     02/12/17 0508   Fall Risk (Adult)   Absence of Falls making progress toward outcome         Problem: Patient Care Overview  Goal: Plan of Care Review    02/12/17 0508   Coping/Psychosocial   Plan Of Care Reviewed With patient;family         Problem: Infection, Risk/Actual (Adult)  Intervention: Prevent Infection/Maximize Resistance    02/12/17 0508   Respiratory Interventions   Airway/Ventilation Management airway patency maintained         Goal: Infection Prevention/Resolution  Patient will demonstrate the desired outcomes by discharge/transition of care.    02/12/17 0508   Infection, Risk/Actual (Adult)   Infection Prevention/Resolution making progress toward outcome         Problem: Pain, Acute (Adult)  Goal: Identify Related Risk Factors and Signs and Symptoms  Related risk factors and signs and symptoms are identified upon initiation of Human Response Clinical Practice Guideline (CPG)    02/12/17 0508   Pain, Acute   Signs and Symptoms (Acute Pain) verbalization of pain descriptors       Goal: Acceptable Pain Control/Comfort Level  Patient will demonstrate the desired outcomes by discharge/transition of care.    02/12/17 0508   Pain, Acute (Adult)   Acceptable Pain Control/Comfort Level making progress toward outcome

## 2017-02-12 NOTE — PLAN OF CARE
02/12/17 1435   Discharge Assessment   Assessment Type Discharge Planning Assessment   Confirmed/corrected address and phone number on facesheet? Yes   Assessment information obtained from? Other  (Patient's dtr. Marisol Hayes, at pt's bedside and provided infor for assessment.)   Expected Length of Stay (days) [id number]   Prior to hospitilization cognitive status: Alert/Oriented  (Dtr Marisol interpreted in [place])   Prior to hospitalization functional status: Independent   Current cognitive status: Alert/Oriented   Current Functional Status: Independent   Arrived From admitted as an inpatient;home or self-care   Lives With alone   Able to Return to Prior Arrangements yes   Is patient able to care for self after discharge? Unable to determine at this time (comments)   How many people do you have in your home that can help with your care after discharge? 0   Who are your caregiver(s) and their phone number(s)? (Dtr Marisol helps pt in the home)   Patient's perception of discharge disposition home or selfcare   Readmission Within The Last 30 Days no previous admission in last 30 days   Patient currently being followed by outpatient case management? No   Patient currently receives home health services? No   Does the patient currently use HME? No   Patient currently receives private duty nursing? No   Patient currently receives any other outside agency services? No   Equipment Currently Used at Home none   Do you have any problems affording any of your prescribed medications? No   Is the patient taking medications as prescribed? yes   Do you have any financial concerns preventing you from receiving the healthcare you need? No   Does the patient have transportation to healthcare appointments? Yes   Transportation Available family or friend will provide   On Dialysis? No   Does the patient receive services at the [facility]mad[facility] Clinic? No   Are there any open cases? No   Discharge Plan A Home   Patient/Family In Agreement  With Plan yes     Pt lives alone but her sister lives a couple blocks away and goes to home every day. Dtr, Janie Bean, checks on her daily ([phone]). Pt requesting HH and would like to use the same HH agency. Pt has an appointment with Dr. Goldberg on Thursday 2/16/17 at 9:30 for boil in the pelvic area. If any appointments are  made please schedule after 3:30p so that transportation is available. On Fridays, transportation is available on every Friday after 12:00pm

## 2017-02-12 NOTE — DISCHARGE SUMMARY
[facility] Hospital Medicine  Discharge Summary      Patient Name: Nany Crenshaw  MRN: [id number]  Admission Date: 2/11/2017  Hospital Length of Stay: 0 days  Discharge Date and Time: 2/12/17  Attending Physician: No att. providers found   Discharging Provider: Veronique Valle NP  Primary Care Provider: Ivette Mcdaniel MD      HPI:   88 yo Romansh speaking female with history for hypothyroidism, recent 6 months ago right leg DVT (plane trip) on eliquis, hypertension and hyperlipidemia who presents to hospital for nausea and vomiting since Thursday (3 days ago). Patient contributes to Bactrim prescribed for suprapubic cyst on Wednesday (4 days ago). Patient then prescribed doxycycline however did not get them filled. She saw Surgery yesterday who told patient not to take further antibiotics. Symptoms occurs with solid, fluid intake and medications. Patient unable to take pedialytes or gatorade. Reports abdominal soreness after vomiting. Denies headaches, dizziness or confusion.     KUB no obstruction. Labs showed hyponatremia .       * No surgery found *      Indwelling Lines/Drains at time of discharge:   Lines/Drains/Airways          No matching active lines, drains, or airways        Hospital Course:   Patient admitted to observation hyponatremia secondary to nausea and vomiting. HCTZ also contributing. Nausea and vomiting likely due to bactrim worsen due to hyponatremia. Na improve with IV fluid. Patient tolerated diet. . Patient have no neuro deficits and tolerating diet.  Hold HCTZ as blood pressure controlled. Patient stable and ready for discharge home with close follow up. [place] will arrange for patient to be [facility] this week. Repeat BNP 2/14/17. Continue lisinopril. Follow up with Surgery as instructed for suprapubic cyst removal this week.      Consults:     Significant Diagnostic Studies: Labs: All labs within the past 24 hours have been  reviewed    Pending Diagnostic Studies:     Procedure Component Value Units Date/Time    Basic metabolic panel [[id number]]     Order Status:  Sent Lab Status:  No result     Specimen:  Blood from Blood     Basic metabolic panel [[id number]]     Order Status:  Sent Lab Status:  No result     Specimen:  Blood from Blood         Final Active Diagnoses:    Diagnosis Date Noted POA    PRINCIPAL PROBLEM:  Hyponatremia [E87.1] 02/11/2017 Yes    Vomiting [R11.10] 02/11/2017 Unknown    Abdominal cyst [K66.8] 02/11/2017 Yes    Right leg DVT [I82.401] 08/20/2016 Yes    Hypothyroidism (acquired) [E03.9]  Yes    Benign hypertension [I10]  Yes      Problems Resolved During this Admission:    Diagnosis Date Noted Date Resolved POA      No new Assessment & Plan notes have been filed under this hospital service since the last note was generated.  Service: Hospital Medicine      Discharged Condition: good    Disposition: Home or Self Care    Follow Up:  Follow-up Information     [facility]    Why:  [facility]        Patient Instructions:     BASIC METABOLIC PANEL   Standing Status: Future  Standing Exp. Date: 04/13/18     Diet general   Order Specific Question Answer Comments   Additional restrictions: Regular      Activity as tolerated     Call MD for:  increased confusion or weakness       Medications:  Reconciled Home Medications:   Discharge Medication List as of 2/12/2017  2:22 PM      CONTINUE these medications which have NOT CHANGED    Details   apixaban 5 mg Tab Take 1 tablet (5 mg total) by mouth 2 (two) times daily., Starting 12/22/2016, Until Discontinued, Normal      bimatoprost (LUMIGAN) 0.03 % ophthalmic drops 1 drop every evening., Until Discontinued, Historical Med      ezetimibe-simvastatin 10-40 mg (VYTORIN 10-40) 10-40 mg per tablet Take 1 tablet by mouth every evening., Starting 4/13/2016, Until Discontinued, Normal      levothyroxine (SYNTHROID) 50 MCG tablet TAKE 1  TABLET BY MOUTH EVERY DAY, Normal      lisinopril 10 MG tablet TAKE 1 AND 1/2 TABLETS BY MOUTH EVERY DAY, Normal      metoprolol tartrate (LOPRESSOR) 25 MG tablet TAKE 1 TABLET BY MOUTH TWICE DAILY, Normal         STOP taking these medications       hydrochlorothiazide (MICROZIDE) 12.5 mg capsule Comments:   Reason for Stopping:         simvastatin (ZOCOR) 40 MG tablet Comments:   Reason for Stopping:             Time spent on the discharge of patient: 35 minutes    Veronique Valle NP  Department of Hospital Medicine  [facility]

## 2017-02-12 NOTE — NURSING
Lab notified of need for lab draw (BMP). Was informed lab would be on the unit in a few. PtDevorah Espinoza will continue to monitor.

## 2017-02-13 ENCOUNTER — TELEPHONE (OUTPATIENT)
Dept: PRIMARY CARE CLINIC | Facility: CLINIC | Age: 82
End: 2017-02-13

## 2017-02-13 NOTE — TELEPHONE ENCOUNTER
Call made to schedule  [facility] for [facility]/17 as per her request and transportation issues.  Understanding is verbalized.

## 2017-02-14 ENCOUNTER — TELEPHONE (OUTPATIENT)
Dept: PRIMARY CARE CLINIC | Facility: CLINIC | Age: 82
End: 2017-02-14

## 2017-02-14 LAB — BACTERIA UR CULT: NORMAL

## 2017-02-15 ENCOUNTER — TELEPHONE (OUTPATIENT)
Dept: CARDIOLOGY | Facility: CLINIC | Age: 82
End: 2017-02-15

## 2017-02-15 ENCOUNTER — TELEPHONE (OUTPATIENT)
Dept: INTERNAL MEDICINE | Facility: CLINIC | Age: 82
End: 2017-02-15

## 2017-02-15 NOTE — TELEPHONE ENCOUNTER
----- Message from Ivette Mcdaniel MD sent at 2/9/2017  9:40 AM CST -----  Call and notify pt daughter  Her thyroid level stable on current medication  Na level trending down a bit make sure she stays hydrated on her fluid pills   Rest of labs and cholesterol stable     Schedule recheck chemistry at follow up

## 2017-02-15 NOTE — TELEPHONE ENCOUNTER
----- Message from Francisca Fitzpatrick sent at 2/15/2017 12:20 PM CST -----  Contact: daughter  Daughter called stating that after discharge from hospital patient was taken off 2 medications and is not happy.  One was Hydrochlorothiazide and not sure of the other. Patient has been having headaches and patient's urine has been dark. Please contact Magdalene Kaminski at [phone].    Thanks!

## 2017-02-16 ENCOUNTER — TELEPHONE (OUTPATIENT)
Dept: INTERNAL MEDICINE | Facility: CLINIC | Age: 82
End: 2017-02-16

## 2017-02-16 NOTE — TELEPHONE ENCOUNTER
----- Message from Claudia Chahal sent at 2/15/2017  9:45 AM CST -----  Contact: Daughter/Marisol/[phone]  Patient's daughter is calling to inform someone in the office that the hospital advised her to [facility] but she would like to see her PCP instead. Please call and advise.    Thank you!

## 2017-02-17 ENCOUNTER — OFFICE VISIT (OUTPATIENT)
Dept: PRIMARY CARE CLINIC | Facility: CLINIC | Age: 82
End: 2017-02-17
Payer: MEDICARE

## 2017-02-17 ENCOUNTER — LAB VISIT (OUTPATIENT)
Dept: LAB | Facility: HOSPITAL | Age: 82
End: 2017-02-17
Attending: NURSE PRACTITIONER
Payer: MEDICARE

## 2017-02-17 VITALS
HEART RATE: 63 BPM | WEIGHT: 119.06 LBS | SYSTOLIC BLOOD PRESSURE: 122 MMHG | OXYGEN SATURATION: 98 % | BODY MASS INDEX: 24.99 KG/M2 | HEIGHT: 58 IN | DIASTOLIC BLOOD PRESSURE: 70 MMHG | RESPIRATION RATE: 16 BRPM | TEMPERATURE: 98 F

## 2017-02-17 DIAGNOSIS — E78.00 PURE HYPERCHOLESTEROLEMIA: ICD-10-CM

## 2017-02-17 DIAGNOSIS — R11.10 VOMITING, INTRACTABILITY OF VOMITING NOT SPECIFIED, PRESENCE OF NAUSEA NOT SPECIFIED, UNSPECIFIED VOMITING TYPE: ICD-10-CM

## 2017-02-17 DIAGNOSIS — E87.1 HYPONATREMIA: ICD-10-CM

## 2017-02-17 DIAGNOSIS — I10 BENIGN HYPERTENSION: Primary | ICD-10-CM

## 2017-02-17 DIAGNOSIS — E03.9 HYPOTHYROIDISM (ACQUIRED): ICD-10-CM

## 2017-02-17 DIAGNOSIS — I82.401 ACUTE DEEP VEIN THROMBOSIS (DVT) OF RIGHT LOWER EXTREMITY, UNSPECIFIED VEIN: ICD-10-CM

## 2017-02-17 LAB
ANION GAP SERPL CALC-SCNC: 10 MMOL/L
BUN SERPL-MCNC: 10 MG/DL
CALCIUM SERPL-MCNC: 9.3 MG/DL
CHLORIDE SERPL-SCNC: 99 MMOL/L
CO2 SERPL-SCNC: 28 MMOL/L
CREAT SERPL-MCNC: 0.7 MG/DL
EST. GFR  (AFRICAN AMERICAN): >60 ML/MIN/1.73 M^2
EST. GFR  (NON AFRICAN AMERICAN): >60 ML/MIN/1.73 M^2
GLUCOSE SERPL-MCNC: 94 MG/DL
POTASSIUM SERPL-SCNC: 4.7 MMOL/L
SODIUM SERPL-SCNC: 137 MMOL/L

## 2017-02-17 PROCEDURE — 99203 OFFICE O/P NEW LOW 30 MIN: CPT | Mod: S$GLB,,, | Performed by: INTERNAL MEDICINE

## 2017-02-17 PROCEDURE — 99999 PR PBB SHADOW E&M-EST. PATIENT-LVL III: CPT | Mod: PBBFAC,,,

## 2017-02-17 PROCEDURE — 80048 BASIC METABOLIC PNL TOTAL CA: CPT

## 2017-02-17 PROCEDURE — 36415 COLL VENOUS BLD VENIPUNCTURE: CPT

## 2017-02-17 PROCEDURE — 1125F AMNT PAIN NOTED PAIN PRSNT: CPT | Mod: S$GLB,,, | Performed by: INTERNAL MEDICINE

## 2017-02-17 PROCEDURE — 1160F RVW MEDS BY RX/DR IN RCRD: CPT | Mod: S$GLB,,, | Performed by: INTERNAL MEDICINE

## 2017-02-17 PROCEDURE — 99499 UNLISTED E&M SERVICE: CPT | Mod: S$GLB,,, | Performed by: PHYSICIAN ASSISTANT

## 2017-02-17 PROCEDURE — 1157F ADVNC CARE PLAN IN RCRD: CPT | Mod: S$GLB,,, | Performed by: INTERNAL MEDICINE

## 2017-02-17 PROCEDURE — 1159F MED LIST DOCD IN RCRD: CPT | Mod: S$GLB,,, | Performed by: INTERNAL MEDICINE

## 2017-02-17 NOTE — PROGRESS NOTES
[facility]  New Visit Progress Note   Recent Hospital Discharge     PRESENTING HISTORY     Chief Complaint/Reason for Visit:  Follow up Hospital Discharge   No chief complaint on file.    PCP: Ivette Mcdaniel MD    History of Present Illness:  Ms. Nany Crenshaw is a 89 y.o. female who was recently admitted to the hospital.    HPI:   88 yo Yi speaking female with history for hypothyroidism, recent 6 months ago right leg DVT (plane trip) on eliquis, hypertension and hyperlipidemia who presents to hospital for nausea and vomiting since Thursday (3 days ago). Patient contributes to Bactrim prescribed for suprapubic cyst on Wednesday (4 days ago). Patient then prescribed doxycycline however did not get them filled. She saw Surgery yesterday who told patient not to take further antibiotics. Symptoms occurs with solid, fluid intake and medications. Patient unable to take pedialytes or gatorade. Reports abdominal soreness after vomiting. Denies headaches, dizziness or confusion.     Hospital Course:   Patient admitted to observation hyponatremia secondary to nausea and vomiting. HCTZ also contributing. Nausea and vomiting likely due to bactrim worsen due to hyponatremia. Na improve with IV fluid. Patient tolerated diet. . Patient have no neuro deficits and tolerating diet. Hold HCTZ as blood pressure controlled. Patient stable and ready for discharge home with close follow up. [place] will arrange for patient to be seen in priority clinic early this week. Repeat BNP 2/14/17. Continue lisinopril. Follow up with Surgery as instructed for suprapubic cyst removal this week.      Admit date: 2/11/17  Discharge date: 2/12/17    ___________________________________________________________________    Today: Patient is 88 yo female with HTN, HLD (LDL 96.2 Feb 2017), thyroid disease (TSH 1.66 Feb 2017), and DVT on eliquis who present to [facility] for post hospital follow up for N/V related to medication side effect.  Medication discontinued and patient treated for dehydration and hyponatremia with IVF both of which improved. She had groin cyst for which she sees surgery and was supposed to have removed this week but missed appointment. She has been having a little dark urine since hospitalization but not much PO fluid intake.       Review of Systems   Constitutional: Negative for chills and fever.   HENT: Negative for congestion.    Eyes: Negative for blurred vision and double vision.   Respiratory: Negative for cough and shortness of breath.    Cardiovascular: Negative for chest pain and palpitations.   Gastrointestinal: Negative for abdominal pain, nausea and vomiting.   Genitourinary: Negative for dysuria and frequency.   Musculoskeletal: Negative for back pain and myalgias.   Skin: Negative for itching and rash.        Suprapubic cyst   Neurological: Positive for headaches. Negative for tremors and seizures.   Psychiatric/Behavioral: Negative for depression. The patient is not nervous/anxious.          PAST HISTORY:     Past Medical History   Diagnosis Date    Colon polyp      no further colon needed    Coronary artery disease     Diverticulosis     Fracture of right distal radius     GERD (gastroesophageal reflux disease)     Hypertension     Hypothyroidism (acquired)     Osteoporosis, post-menopausal     Scoliosis        Past Surgical History   Procedure Laterality Date    Cataract extraction       ou    Yag cap       od    Hysterectomy      Adenoidectomy       section      Cholecystectomy      Eye surgery  2014     ptosis surgery         Family History   Problem Relation Age of Onset    Cancer Mother      skin cancer     Dementia Mother     Hypertension Mother     Heart disease Brother     Cancer Brother      lung cancer     No Known Problems Father     No Known Problems Sister     No Known Problems Maternal Aunt     No Known Problems Maternal Uncle     No Known Problems Paternal Aunt      No Known Problems Paternal Uncle     No Known Problems Maternal Grandmother     No Known Problems Maternal Grandfather     No Known Problems Paternal Grandmother     No Known Problems Paternal Grandfather     Amblyopia Neg Hx     Blindness Neg Hx     Cataracts Neg Hx     Diabetes Neg Hx     Glaucoma Neg Hx     Macular degeneration Neg Hx     Retinal detachment Neg Hx     Strabismus Neg Hx     Stroke Neg Hx     Thyroid disease Neg Hx        Social History     Social History    Marital status:      Spouse name: N/A    Number of children: 3    Years of education: 12     Occupational History    retired cook/      Social History Main Topics    Smoking status: Never Smoker    Smokeless tobacco: Not on file    Alcohol use 3.6 oz/week     6 Glasses of wine per week      Comment: occasional /social    Drug use: No    Sexual activity: Yes     Partners: Male     Other Topics Concern    Not on file     Social History Narrative    Adult Screenings updated and reviewed   6/11/14    Mammogram( for females) 4/25/12    Pap ( for females) history of hysterectomy    Colonoscopy age  50    Flu shot 10/14/2013    Td ?  2005    Pneumovax     Zostavax recommended one time at  age  60- not done yet     Eye exam recommended yearly           MEDICATIONS & ALLERGIES:     Current Outpatient Prescriptions on File Prior to Visit   Medication Sig Dispense Refill    apixaban 5 mg Tab Take 1 tablet (5 mg total) by mouth 2 (two) times daily. 60 tablet 4    bimatoprost (LUMIGAN) 0.03 % ophthalmic drops 1 drop every evening.      ezetimibe-simvastatin 10-40 mg (VYTORIN 10-40) 10-40 mg per tablet Take 1 tablet by mouth every evening. 90 tablet 3    levothyroxine (SYNTHROID) 50 MCG tablet TAKE 1 TABLET BY MOUTH EVERY DAY 90 tablet 1    lisinopril 10 MG tablet TAKE 1 AND 1/2 TABLETS BY MOUTH EVERY  tablet 4    metoprolol tartrate (LOPRESSOR) 25 MG tablet TAKE 1 TABLET BY MOUTH TWICE DAILY 180  tablet 1     No current facility-administered medications on file prior to visit.         Review of patient's allergies indicates:   Allergen Reactions    Bactrim [sulfamethoxazole-trimethoprim] Nausea And Vomiting       OBJECTIVE:     Vital Signs:  There were no vitals filed for this visit.  Wt Readings from Last 1 Encounters:   02/17/17 1530 54 kg (119 lb 0.8 oz)     Body mass index is 24.88 kg/(m^2).     Physical Exam   Constitutional: She is oriented to person, place, and time. She appears well-developed and well-nourished. No distress.   HENT:   Head: Normocephalic and atraumatic.   Eyes: EOM are normal. Pupils are equal, round, and reactive to light.   Neck: Normal range of motion. Neck supple.   Cardiovascular: Normal rate and regular rhythm.    No murmur heard.  Pulmonary/Chest: Effort normal and breath sounds normal.   Abdominal: Soft. Bowel sounds are normal. There is no tenderness.   Musculoskeletal: Normal range of motion.   Neurological: She is alert and oriented to person, place, and time.   Skin: Skin is warm and dry. No rash noted.   Indurated cyst to suprapubic area 1x1 toward R groin   Psychiatric: She has a normal mood and affect. Her behavior is normal.       Laboratory  Lab Results   Component Value Date    WBC 4.26 02/11/2017    HGB 12.7 02/11/2017    HCT 36.8 (L) 02/11/2017    MCV 86 02/11/2017     02/11/2017     BMP  Lab Results   Component Value Date     (L) 02/12/2017    K 3.7 02/12/2017    CL 99 02/12/2017    CO2 22 (L) 02/12/2017    BUN 8 02/12/2017    CREATININE 0.6 02/12/2017    CALCIUM 7.9 (L) 02/12/2017    ANIONGAP 8 02/12/2017    ESTGFRAFRICA >60 02/12/2017    EGFRNONAA >60 02/12/2017     Lab Results   Component Value Date    ALT 22 02/11/2017    AST 34 02/11/2017    ALKPHOS 72 02/11/2017    BILITOT 1.2 (H) 02/11/2017     Lab Results   Component Value Date    INR 1.0 02/11/2017    INR 1.0 08/20/2016     Lab Results   Component Value Date    HGBA1C 5.6 01/20/2005     No  results for input(s): POCTGLUCOSE in the last 72 hours.    Diagnostic Results:  none      TRANSITION OF CARE:     [facility] Contact Note: 2/12/17    Family and/or Caretaker present at visit?  Yes.  Diagnostic tests reviewed/disposition: No diagnosic tests pending after this hospitalization.  Disease/illness education: dehydration  Home health/community services discussion/referrals: Patient does not have home health established from hospital visit.  They do not need home health.  If needed, we will set up home health for the patient.   Establishment or re-establishment of referral orders for community resources: No other necessary community resources.   Discussion with other health care providers: No discussion with other health care providers necessary.     Medications Reconciliation:   I have reconciled the patient's home medications and discharge medications with the patient/family. I have updated all changes.  Refer to After-Visit Medication List.    ASSESSMENT & PLAN:     HIGH RISK CONDITION(S):  none    Benign hypertension  Well controlled in clinic today. Patient concern for having discontinued HCTZ explained this was likely contributing to dehydration and BP in acceptable range without it.     Acute deep vein thrombosis (DVT) of right lower extremity, unspecified vein  Patient currently off eliquis in preparation for cyst removal, however, felt poorly on appointment date this week and canceled. Urged to make appointment for the beginning of next week to remove cyst in order to resume OAC. She also has repeat US scheduled in April prior to cardiology follow up to assure resolution of DVT.     Hypothyroidism (acquired)  TSH 1.66 during hospitalization. Continue synthroid.     HLD  LDL 96 during hospitalization, well controlled    Vomiting, intractability of vomiting not specified, presence of nausea not specified, unspecified vomiting type  Vomiting has resolved, patient reports urine a little darker  this week and feels weak at times, likely 2/2 inadequate PO fluid intake. Urged to increase fluid intake. She was supposed to have repeat BMP to assure resolution of hyponatremia prior to visit but did not go. Told patient to go straight there today and we will call her if anything results abnormally.       Instructions for the patient:      Scheduled Follow-up :  Future Appointments  Date Time Provider Department Center   3/3/2017 2:30 PM [facility] Natanael Roque PCW   3/3/2017 3:00 PM Ivette Mcdaniel MD [facility] IM Natanael Roque PCW   4/4/2017 4:15 PM [facility] USVAS1 [facility] VASCUS [facility]   4/11/2017 3:00 PM Santos Noe MD [facility] CARDIO [facility]       After Visit Medication List :     Medication List          This list is accurate as of: 2/17/17  3:21 PM.  Always use your most recent med list.                     apixaban 5 mg Tab   Take 1 tablet (5 mg total) by mouth 2 (two) times daily.       bimatoprost 0.03 % ophthalmic drops   Commonly known as:  LUMIGAN       ezetimibe-simvastatin 10-40 mg 10-40 mg per tablet   Commonly known as:  VYTORIN 10-40   Take 1 tablet by mouth every evening.       levothyroxine 50 MCG tablet   Commonly known as:  SYNTHROID   TAKE 1 TABLET BY MOUTH EVERY DAY       lisinopril 10 MG tablet   TAKE 1 AND 1/2 TABLETS BY MOUTH EVERY DAY       metoprolol tartrate 25 MG tablet   Commonly known as:  LOPRESSOR   TAKE 1 TABLET BY MOUTH TWICE DAILY               Radha Grimes PA-C  Hospitalist-Department of Hospital Medicine  [address] [place] [address]  Office [phone] Pager [phone]

## 2017-02-18 ENCOUNTER — HOSPITAL ENCOUNTER (EMERGENCY)
Facility: HOSPITAL | Age: 82
Discharge: HOME | End: 2017-02-18
Attending: EMERGENCY MEDICINE
Payer: MEDICARE

## 2017-02-18 ENCOUNTER — NURSE TRIAGE (OUTPATIENT)
Dept: ADMINISTRATIVE | Facility: CLINIC | Age: 82
End: 2017-02-18

## 2017-02-18 VITALS
SYSTOLIC BLOOD PRESSURE: 176 MMHG | HEART RATE: 64 BPM | WEIGHT: 119 LBS | RESPIRATION RATE: 18 BRPM | TEMPERATURE: 98 F | DIASTOLIC BLOOD PRESSURE: 70 MMHG | OXYGEN SATURATION: 95 % | BODY MASS INDEX: 24.98 KG/M2 | HEIGHT: 58 IN

## 2017-02-18 DIAGNOSIS — R42 LIGHTHEADEDNESS: ICD-10-CM

## 2017-02-18 DIAGNOSIS — I10 ESSENTIAL HYPERTENSION: Primary | ICD-10-CM

## 2017-02-18 LAB
ALBUMIN SERPL BCP-MCNC: 3.6 G/DL
ALP SERPL-CCNC: 70 U/L
ALT SERPL W/O P-5'-P-CCNC: 33 U/L
ANION GAP SERPL CALC-SCNC: 10 MMOL/L
AST SERPL-CCNC: 36 U/L
BASOPHILS # BLD AUTO: 0.03 K/UL
BASOPHILS NFR BLD: 0.5 %
BILIRUB SERPL-MCNC: 0.7 MG/DL
BILIRUB UR QL STRIP: NEGATIVE
BUN SERPL-MCNC: 13 MG/DL
CALCIUM SERPL-MCNC: 9.3 MG/DL
CHLORIDE SERPL-SCNC: 98 MMOL/L
CLARITY UR: CLEAR
CO2 SERPL-SCNC: 27 MMOL/L
COLOR UR: NORMAL
CREAT SERPL-MCNC: 0.7 MG/DL
DIFFERENTIAL METHOD: ABNORMAL
EOSINOPHIL # BLD AUTO: 0.1 K/UL
EOSINOPHIL NFR BLD: 1.3 %
ERYTHROCYTE [DISTWIDTH] IN BLOOD BY AUTOMATED COUNT: 13.2 %
EST. GFR  (AFRICAN AMERICAN): >60 ML/MIN/1.73 M^2
EST. GFR  (NON AFRICAN AMERICAN): >60 ML/MIN/1.73 M^2
GLUCOSE SERPL-MCNC: 99 MG/DL
GLUCOSE UR QL STRIP: NEGATIVE
HCT VFR BLD AUTO: 37.4 %
HGB BLD-MCNC: 12.6 G/DL
HGB UR QL STRIP: NEGATIVE
KETONES UR QL STRIP: NEGATIVE
LEUKOCYTE ESTERASE UR QL STRIP: NEGATIVE
LYMPHOCYTES # BLD AUTO: 1.7 K/UL
LYMPHOCYTES NFR BLD: 28.3 %
MCH RBC QN AUTO: 29.4 PG
MCHC RBC AUTO-ENTMCNC: 33.7 %
MCV RBC AUTO: 87 FL
MONOCYTES # BLD AUTO: 0.4 K/UL
MONOCYTES NFR BLD: 6.8 %
NEUTROPHILS # BLD AUTO: 3.8 K/UL
NEUTROPHILS NFR BLD: 63.1 %
NITRITE UR QL STRIP: NEGATIVE
PH UR STRIP: 7 [PH] (ref 5–8)
PLATELET # BLD AUTO: 326 K/UL
PMV BLD AUTO: 8.5 FL
POTASSIUM SERPL-SCNC: 4.1 MMOL/L
PROT SERPL-MCNC: 6.5 G/DL
PROT UR QL STRIP: NEGATIVE
RBC # BLD AUTO: 4.28 M/UL
SODIUM SERPL-SCNC: 135 MMOL/L
SP GR UR STRIP: 1 (ref 1–1.03)
TROPONIN I SERPL DL<=0.01 NG/ML-MCNC: <0.006 NG/ML
TSH SERPL DL<=0.005 MIU/L-ACNC: 3.54 UIU/ML
URN SPEC COLLECT METH UR: NORMAL
UROBILINOGEN UR STRIP-ACNC: NEGATIVE EU/DL
WBC # BLD AUTO: 6.05 K/UL

## 2017-02-18 PROCEDURE — 99284 EMERGENCY DEPT VISIT MOD MDM: CPT

## 2017-02-18 PROCEDURE — 84484 ASSAY OF TROPONIN QUANT: CPT

## 2017-02-18 PROCEDURE — 85025 COMPLETE CBC W/AUTO DIFF WBC: CPT

## 2017-02-18 PROCEDURE — 84443 ASSAY THYROID STIM HORMONE: CPT

## 2017-02-18 PROCEDURE — 81003 URINALYSIS AUTO W/O SCOPE: CPT

## 2017-02-18 PROCEDURE — 80053 COMPREHEN METABOLIC PANEL: CPT

## 2017-02-18 RX ORDER — LISINOPRIL 10 MG/1
20 TABLET ORAL DAILY
Qty: 60 TABLET | Refills: 0 | Status: SHIPPED | OUTPATIENT
Start: 2017-02-18 | End: 2018-04-03 | Stop reason: SDUPTHER

## 2017-02-18 NOTE — ED AVS SNAPSHOT
[facility]  [place] [address]               Nany Crenshaw   2017  8:37 PM   ED    Descripción:  Female : 1927   Departamento:  [facility]           Wright Cuidado fue coordinado por:     Provider Role From To    Lamont Gomez Jr., MD Attending Provider 17 --      Razón de la bety     Hypertension           Diagnósticos de Esta Visita        Comentarios    Essential hypertension    -  Primario     Lightheadedness           ED Disposition     Ninguna           Lista de tareas           Información de seguimiento     Realice un seguimiento con:  Ivette Mcdaniel MD    Cómo:  Shalonda rey bety lo antes posible    Cuándo:  2017    Especialidad:  Internal Medicine    Por qué:  Please see your PCP on Monday for reevaluation.  Return for new or worsening symptoms.  Please take medications as prescribed.    Información de contacto:    [address]  [place] [address]  [phone]        Recetas para recoger        Disp Refills Start End    lisinopril 10 MG tablet 60 tablet 0 2017    Take 2 tablets (20 mg total) by mouth once daily. - Oral    [facility]arm[facility]: [id number] - [place], [place] - [facility] LAPAO [facility] AT [place] No. de tlfo: #: [phone]         [place] en Llamada     [place] En Llamada Línea de Enfermeras - Asistencia   Enfermeras registradas de [place] pueden ayudarle a reservar rey bety, proveer educación para la hugo, asesoría clínica, y otros servicios de asesoramiento.   Llame para yuly servicio gratuito a [phone].             Medicamentos           Mensaje sobre Medicamentos     Verificar los cambios y / o adiciones a wright régimen de medicación son los mismos que discutir con wright médico. Si cualquiera de estos cambios o adiciones son incorrectos, por favor notifique a wright proveedor de atención médica.        EMPEZAR a brendon estos medicamentos NUEVOS         "Refills    lisinopril 10 MG tablet 0    Sig: Take 2 tablets (20 mg total) by mouth once daily.    Categoría: Print    Vía: Oral           Verifique que la siguiente lista de medicamentos es rey representación exacta de los medicamentos que está tomando actualmente. Si no hay ningunos reportados, la lista puede estar en banuelos. Si no es correcta, por favor póngase en contacto con flores proveedor de atención médica. Lleve esta lista con usted en salome de emergencia.           Medicamentos Actuales     apixaban 5 mg Tab Take 1 tablet (5 mg total) by mouth 2 (two) times daily.    ezetimibe-simvastatin 10-40 mg (VYTORIN 10-40) 10-40 mg per tablet Take 1 tablet by mouth every evening.    levothyroxine (SYNTHROID) 50 MCG tablet TAKE 1 TABLET BY MOUTH EVERY DAY    metoprolol tartrate (LOPRESSOR) 25 MG tablet TAKE 1 TABLET BY MOUTH TWICE DAILY    bimatoprost (LUMIGAN) 0.03 % ophthalmic drops 1 drop every evening.    lisinopril 10 MG tablet Take 2 tablets (20 mg total) by mouth once daily.           Información de referencia clínica           Shari signos vitales lionel     PS Pulso Temperatura Resp [place] Pes[place]    164/70 62 98 °F (36.7 °C) (Oral) 20 4' 10" (1.473 m) 54 kg (119 lb)    Ultima menstruación SpO2 BMI (IMC)             (LMP Unknown) 96% 24.87 kg/m2         Alergias     A partir del:  2/18/2017           Reacciones    Bactrim [Sulfamethoxazole-trimethoprim] Nausea And Vomiting      Vacunas     Administradas en la fecha de la visita:  2/18/2017        None      ED Micro, Lab, POCT     Start Ordered       Status Ordering Provider    02/18/17 2104 02/18/17 2104  Urinalysis  STAT      Final result     02/18/17 2104 02/18/17 2104  TSH  STAT      Final result     02/18/17 2104 02/18/17 2104  CBC auto differential  Once      Final result     02/18/17 2104 02/18/17 2104  Comprehensive metabolic panel  STAT      Final result     02/18/17 2104 02/18/17 2104  Troponin I  STAT      Final result       ED Imaging Orders     Start Ordered "       Status Ordering Provider    02/18/17 2105 02/18/17 2104  X-Ray Chest AP Portable  1 time imaging      Final result     02/18/17 2105 02/18/17 2104  CT Head Without Contrast  1 time imaging      Final result       Referencias/Adjuntos de marcos     HIGH BLOOD PRESSURE, ESTABLISHED, OUT OF CONTROL (Cameroonian)    DIZZINESS, UNCERTAIN CAUSE (Cameroonian)      Shari Citas Programadas     Feb 21, 2017  8:30 AM CST   Removals with Joshua Goldberg, MD   [facility] - General Surgery ([facility] )    [address] [facility] [address] [place] [id number] [phone]            Mar 03, 2017  2:30 PM CST   Non-Fasting Lab with LAB, APPOINTMENT [facility] ([facility])    [address] [facility] [address] [place] [id number] [phone]            Mar 03, 2017  3:00 PM CST   Follow Up/Office Visit with Ivette Mcdaniel MD   [facility] - Internal Medicine ([facility])    [address] [facility] [address] [place] [id number] [phone]            Apr 04, 2017  4:15 PM CDT   Us Extremity Veins with WB USVAS1   [facility] ([facility])    [address] [place] [address] [phone]            Apr 11, 2017  3:00 PM CDT   Established Patient Visit with Santos Noe MD   [facility] - Cardiology ([facility])    [address] [facility] [address] [place] [id number] [phone]               [facility] cumple con las leyes federales aplicables de derechos civiles y no discrimina por motivos de jim, color, origen nacional, edad, discapacidad, o sexo.        Language Assistance Services     ATTENTION: Language assistance services are available, free of charge. Please call [phone].      ATENCIÓN: Si habla español, tiene a flores disposición servicios gratuitos de asistencia lingüística. Llame al [phone].     CHÚ Ý: N?u b?n nói Ti?ng Vi?t, có các d?ch v? h? tr? ngôn ng? mi?n phí dành cho b?n. G?i  s? [phone].                      [facility]  [address] [place] [address]               Nany Crenshaw   2017  8:37 PM   ED    Description:  Female : 1927   Department:  [facility]           Your Care was Coordinated By:     Provider Role From To    Lamont Gomez Jr., MD Attending Provider 17 --      Reason for Visit     Hypertension           Diagnoses this Visit        Comments    Essential hypertension    -  Primary     Lightheadedness           ED Disposition     None           To Do List           Follow-up Information     Follow up with Ivette Mcdaniel MD. Schedule an appointment as soon as possible for a visit in 2 days.    Specialty:  Internal Medicine    Why:  Please see your PCP on Monday for reevaluation.  Return for new or worsening symptoms.  Please take medications as prescribed.    Contact information:    [address]  [facility] [id number] [phone]         These Medications        Disp Refills Start End    lisinopril 10 MG tablet 60 tablet 0 2017    Take 2 tablets (20 mg total) by mouth once daily. - Oral    Pharmacy: [facility] - [place] AT [facility] #: [phone]         [facility]     [facility] Nurse Care Line - 24 Assistance  Registered nurses in the [facility] provide clinical advisement, health education, appointment booking, and other advisory services.  Call for this free service at [phone].             Medications           Message regarding Medications     Verify the changes and/or additions to your medication regime listed below are the same as discussed with your clinician today.  If any of these changes or additions are incorrect, please notify your healthcare provider.        START taking these NEW medications        Refills    lisinopril 10 MG tablet 0    Sig: Take 2 tablets (20  "mg total) by mouth once daily.    Class: Print    Route: Oral           Verify that the below list of medications is an accurate representation of the medications you are currently taking.  If none reported, the list may be blank. If incorrect, please contact your healthcare provider. Carry this list with you in case of emergency.           Current Medications     apixaban 5 mg Tab Take 1 tablet (5 mg total) by mouth 2 (two) times daily.    ezetimibe-simvastatin 10-40 mg (VYTORIN 10-40) 10-40 mg per tablet Take 1 tablet by mouth every evening.    levothyroxine (SYNTHROID) 50 MCG tablet TAKE 1 TABLET BY MOUTH EVERY DAY    metoprolol tartrate (LOPRESSOR) 25 MG tablet TAKE 1 TABLET BY MOUTH TWICE DAILY    bimatoprost (LUMIGAN) 0.03 % ophthalmic drops 1 drop every evening.    lisinopril 10 MG tablet Take 2 tablets (20 mg total) by mouth once daily.           Clinical Reference Information           Your Vitals Were     BP Pulse Temp Resp Height Weight    164/70 62 98 °F (36.7 °C) (Oral) 20 4' 10" (1.473 m) 54 kg (119 lb)    Last Period SpO2 BMI             (LMP Unknown) 96% 24.87 kg/m2         Allergies as of 2/18/2017        Reactions    Bactrim [Sulfamethoxazole-trimethoprim] Nausea And Vomiting      Immunizations Administered on Date of Encounter - 2/18/2017     None      ED Micro, Lab, POCT     Start Ordered       Status Ordering Provider    02/18/17 2104 02/18/17 2104  Urinalysis  STAT      Final result     02/18/17 2104 02/18/17 2104  TSH  STAT      Final result     02/18/17 2104 02/18/17 2104  CBC auto differential  Once      Final result     02/18/17 2104 02/18/17 2104  Comprehensive metabolic panel  STAT      Final result     02/18/17 2104 02/18/17 2104  Troponin I  STAT      Final result       ED Imaging Orders     Start Ordered       Status Ordering Provider    02/18/17 2105 02/18/17 2104  X-Ray Chest AP Portable  1 time imaging      Final result     02/18/17 2105 02/18/17 2104  CT Head Without Contrast  1 " time imaging      Final result       Discharge References/Attachments     HIGH BLOOD PRESSURE, ESTABLISHED, OUT OF CONTROL (Algerian)    DIZZINESS, UNCERTAIN CAUSE (Algerian)      Your Scheduled Appointments     Feb 21, 2017  8:30 AM CST   Removals with Joshua Goldberg, MD [address] - General Surgery ([address] )    [address]  [facility] [phone]            Mar 03, 2017  2:30 PM CST   Non-Fasting Lab with LAB, APPOINTMENT [facility] IN[facility]ED [facility] ([facility])    [address]  [place] [phone] [phone]            Mar 03, 2017  3:00 PM CST   Follow Up/Office Visit with MD Natanael Ross [facility] - Internal Medicine ([facility])    [address]  [place] [phone]            Apr 04, 2017  4:15 PM CDT   Us Extremity Veins with [facility] USVAS1   [facility] ([facility])    [address] [place] [address] [phone]            Apr 11, 2017  3:00 PM CDT   Established Patient Visit with Santos Noe MD   [facility] - Cardiology ([facility])    [address] [facility] [address] [place] [address] [phone]               [facility] complies with applicable Federal civil rights laws and does not discriminate on the basis of race, color, national origin, age, disability, or sex.        Language Assistance Services     ATTENTION: Language assistance services are available, free of charge. Please call [phone].      ATENCIÓN: Si habla español, tiene a flores disposición servicios gratuitos de asistencia lingüística. Llame al [phone].     CHÚ Ý: N?u b?n nói Ti?ng Vi?t, có các d?ch v? h? tr? ngôn ng? mi?n phí dành cho b?n. G?i s? [phone].

## 2017-02-18 NOTE — TELEPHONE ENCOUNTER
Patient's daughter called and states that her mom has had a headache all day  Pain level 6/10, bp is 155/82, hr of 67. Advised patient's daughter to bring her to ED for evaluation.   Reason for Disposition   [1] BP  >= 160 / 100 AND [2] cardiac or neurologic symptoms    (e.g., chest pain, difficulty breathing, unsteady gait, blurred vision)    Protocols used: ST HIGH BLOOD PRESSURE-A-AH

## 2017-02-19 ENCOUNTER — PATIENT MESSAGE (OUTPATIENT)
Dept: INTERNAL MEDICINE | Facility: CLINIC | Age: 82
End: 2017-02-19

## 2017-02-19 ENCOUNTER — PATIENT MESSAGE (OUTPATIENT)
Dept: CARDIOLOGY | Facility: CLINIC | Age: 82
End: 2017-02-19

## 2017-02-19 NOTE — ED TRIAGE NOTES
Pt reports high blood pressure. Pt was recently taken of hydrochlorothiazide, family states she was dehydrated.

## 2017-02-19 NOTE — ED PROVIDER NOTES
"Encounter Date: 2/18/2017    SCRIBE #1 NOTE: I, Edward Osorio , am scribing for, and in the presence of,  Lamont Gomez Jr., MD . I have scribed the following portions of the note - Other sections scribed: HPI/ROS .       History     Chief Complaint   Patient presents with    Hypertension     "She has high blood pressure, headache, and she's feeling woozy."     Review of patient's allergies indicates:   Allergen Reactions    Bactrim [sulfamethoxazole-trimethoprim] Nausea And Vomiting     HPI Comments: CC: HTN    HPI: This Algerian-speaking 89 y.o. female with HTN, CAD, and hypothyroidism with hx of recent DVT 6 months ago (recently stopped taking Eliquis) presents to the ED in the care of her daughter, who provides translation, c/o a 2-weeks hx of acute-onset headache and sensation of lightheadedness that have not resolved since onset. Pt describes her lightheadedness as "feeling woozy but not dizzy." She also reports intermittent palpitations. No prior attempted tx. Per daughter, pt was hospitalized for one day and was d/c 6 days ago secondary to dehydration after persistent N/V from taking Bactrim prescribed to tx a recurring suprapubic cyst. Daughter reports at d/c, pt was advised to stop taking HCTZ, which resulted in elevated blood pressure for the past week. She states the pt is scheduled to have surgery to remove the suprapubic cyst, that was previously I&D'd, in 3 days and was told to stop taking Eliquis in preparation for the surgery. Pt otherwise denies decreased appetite, fever, neck pain, congestion, cough, CP, N/V/D, abdominal pain, constipation, rash, hematuria, and confusion.       The history is provided by the patient and a relative (daughter). The history is limited by a language barrier (Algerian ). A  was used.     Past Medical History   Diagnosis Date    Colon polyp      no further colon needed    Coronary artery disease     Diverticulosis     Fracture of right " distal radius     GERD (gastroesophageal reflux disease)     Hypertension     Hypothyroidism (acquired)     Osteoporosis, post-menopausal     Scoliosis      Past Medical History Pertinent Negatives   Diagnosis Date Noted    Amblyopia 2013    Arthritis 2013    Cataract 2013    Diabetes mellitus 2013    Diabetic retinopathy 2013    Glaucoma 2013    Macular degeneration 2013    Retinal detachment 2013    Sickle cell anemia 2015    Sickle cell trait 2015    Strabismus 2013    Uveitis 2013     Past Surgical History   Procedure Laterality Date    Cataract extraction       ou    Yag cap       od    Hysterectomy      Adenoidectomy       section      Cholecystectomy      Eye surgery  2014     ptosis surgery       Family History   Problem Relation Age of Onset    Cancer Mother      skin cancer     Dementia Mother     Hypertension Mother     Heart disease Brother     Cancer Brother      lung cancer     No Known Problems Father     No Known Problems Sister     No Known Problems Maternal Aunt     No Known Problems Maternal Uncle     No Known Problems Paternal Aunt     No Known Problems Paternal Uncle     No Known Problems Maternal Grandmother     No Known Problems Maternal Grandfather     No Known Problems Paternal Grandmother     No Known Problems Paternal Grandfather     Amblyopia Neg Hx     Blindness Neg Hx     Cataracts Neg Hx     Diabetes Neg Hx     Glaucoma Neg Hx     Macular degeneration Neg Hx     Retinal detachment Neg Hx     Strabismus Neg Hx     Stroke Neg Hx     Thyroid disease Neg Hx      Social History   Substance Use Topics    Smoking status: Never Smoker    Smokeless tobacco: None    Alcohol use 3.6 oz/week     6 Glasses of wine per week      Comment: occasional /social     Review of Systems   Constitutional: Negative for appetite change, chills and fever.   HENT: Negative for congestion,  rhinorrhea and sore throat.    Eyes: Negative for visual disturbance.   Respiratory: Negative for cough and shortness of breath.    Cardiovascular: Negative for chest pain.   Gastrointestinal: Negative for abdominal pain, constipation, diarrhea, nausea and vomiting.   Genitourinary: Negative for dysuria, frequency and hematuria.   Musculoskeletal: Negative for back pain, myalgias, neck pain and neck stiffness.   Skin: Negative for rash and wound.        (+) suprapubic cyst    Neurological: Positive for light-headedness and headaches. Negative for dizziness, syncope, weakness and numbness.   Psychiatric/Behavioral: Negative for confusion.       Physical Exam   Initial Vitals   BP Pulse Resp Temp SpO2   02/18/17 2033 02/18/17 2033 02/18/17 2033 02/18/17 2033 02/18/17 2033   156/71 65 20 98 °F (36.7 °C) 97 %     Physical Exam    Nursing note and vitals reviewed.  Constitutional: She appears well-developed and well-nourished. She is not diaphoretic. No distress.   Pleasant, smiling, well-appearing elderly female in no acute distress.   HENT:   Head: Normocephalic and atraumatic.   Right Ear: External ear normal.   Left Ear: External ear normal.   Nose: Nose normal.   Mouth/Throat: Oropharynx is clear and moist.   Eyes: Conjunctivae and EOM are normal. Pupils are equal, round, and reactive to light. Right eye exhibits no discharge. Left eye exhibits no discharge. No scleral icterus.   Neck: Normal range of motion. Neck supple.   Cardiovascular: Normal rate, regular rhythm, normal heart sounds and intact distal pulses.   No murmur heard.  Carotids without bruit.  Regular rate and rhythm.  No murmurs.   Pulmonary/Chest: Breath sounds normal. No respiratory distress. She has no wheezes. She has no rhonchi. She has no rales.   Abdominal: Soft. Bowel sounds are normal. She exhibits no distension and no mass. There is no tenderness. There is no rebound and no guarding.   Musculoskeletal: Normal range of motion. She exhibits no  edema or tenderness.   Neurological: She is alert and oriented to person, place, and time. She has normal strength. No cranial nerve deficit or sensory deficit.   GCS is 15.  Patient is alert and oriented to person, place, time, and events.  Cranial nerves II-XII intact by exam.  Strength and sensation equal and intact in all 4 extremities.  There is no evidence of ataxia or dismetria.   Skin: Skin is warm and dry. No rash noted. No erythema. No pallor.   Psychiatric: She has a normal mood and affect. Her behavior is normal. Judgment and thought content normal.         ED Course   Procedures  Labs Reviewed   CBC W/ AUTO DIFFERENTIAL - Abnormal; Notable for the following:        Result Value    MPV 8.5 (*)     All other components within normal limits   COMPREHENSIVE METABOLIC PANEL - Abnormal; Notable for the following:     Sodium 135 (*)     All other components within normal limits   URINALYSIS   TSH   TROPONIN I     EKG Readings: (Independently Interpreted)   Initial Reading: No STEMI.   EKG reviewed and interpreted by me shows sinus rhythm and rate is 66.  There is a prolonged QRS interval.  There is a left axis deviation.  There is poor R-wave progression.  There aren't no ST segment or T-wave ischemic findings.       X-Rays:   Independently Interpreted Readings:   Other Readings:  Chest x-ray reviewed and interpreted by me shows no evidence of pulmonary edema, pneumothorax, or consolidations/ infiltrates.    CT the head reviewed and interpreted by me shows no intracranial hemorrhage or mass effect.    Medical Decision Making:   ED Management:  This is the emergent evaluation of a 89-year-old female presents the emergency department with intermittent headaches and lightheaded sensation that is been going on for approximately 2 weeks.Differential diagnosis at the time of initial evaluation included, but was not limited to:  Dehydration, metabolic disturbance, acute renal failure, urinary tract infection,  pneumonia, intracranial hemorrhage, intracranial mass.    Patient's imaging and laboratory evaluation did not reveal any acute findings such as metabolic disturbance, renal failure, urinary tract infection, pneumonia, or intracranial process.  Unclear what is causing the patient's symptoms.  However, vital signs are reassuring other than hypertension.  I do not suspect hypertensive crisis.  I discussed all the above with the patient and her daughter.  I will describe slightly higher dose of the patient's lisinopril pending follow-up with her PCP.  Patient advised to follow-up as above and return for new or worsening symptoms such as worsening weakness, chest pain, shortness of breath, or fever.            Scribe Attestation:   Scribe #1: I performed the above scribed service and the documentation accurately describes the services I performed. I attest to the accuracy of the note.    Attending Attestation:           Physician Attestation for Scribe:  Physician Attestation Statement for Scribe #1: I, Lamont Gomez Jr., MD, reviewed documentation, as scribed by Edward Osorio  in my presence, and it is both accurate and complete.                 ED Course     Clinical Impression:   The primary encounter diagnosis was Essential hypertension. A diagnosis of Lightheadedness was also pertinent to this visit.          Lamont Gomez Jr., MD  02/19/17 2206

## 2017-02-20 ENCOUNTER — OFFICE VISIT (OUTPATIENT)
Dept: CARDIOLOGY | Facility: CLINIC | Age: 82
End: 2017-02-20
Payer: MEDICARE

## 2017-02-20 VITALS
BODY MASS INDEX: 23.59 KG/M2 | WEIGHT: 112.38 LBS | HEART RATE: 67 BPM | DIASTOLIC BLOOD PRESSURE: 59 MMHG | SYSTOLIC BLOOD PRESSURE: 118 MMHG | HEIGHT: 58 IN | OXYGEN SATURATION: 96 %

## 2017-02-20 DIAGNOSIS — E03.9 HYPOTHYROIDISM (ACQUIRED): ICD-10-CM

## 2017-02-20 DIAGNOSIS — E78.00 PURE HYPERCHOLESTEROLEMIA: ICD-10-CM

## 2017-02-20 DIAGNOSIS — I10 BENIGN HYPERTENSION: ICD-10-CM

## 2017-02-20 DIAGNOSIS — I25.83 CORONARY ARTERY DISEASE DUE TO LIPID RICH PLAQUE: Primary | ICD-10-CM

## 2017-02-20 DIAGNOSIS — I25.10 CORONARY ARTERY DISEASE DUE TO LIPID RICH PLAQUE: Primary | ICD-10-CM

## 2017-02-20 DIAGNOSIS — I82.401 ACUTE DEEP VEIN THROMBOSIS (DVT) OF RIGHT LOWER EXTREMITY, UNSPECIFIED VEIN: ICD-10-CM

## 2017-02-20 DIAGNOSIS — I73.9 PERIPHERAL VASCULAR DISEASE, UNSPECIFIED: ICD-10-CM

## 2017-02-20 PROCEDURE — 99214 OFFICE O/P EST MOD 30 MIN: CPT | Mod: S$GLB,,, | Performed by: INTERNAL MEDICINE

## 2017-02-20 PROCEDURE — 99499 UNLISTED E&M SERVICE: CPT | Mod: S$GLB,,, | Performed by: INTERNAL MEDICINE

## 2017-02-20 PROCEDURE — 1159F MED LIST DOCD IN RCRD: CPT | Mod: S$GLB,,, | Performed by: INTERNAL MEDICINE

## 2017-02-20 PROCEDURE — 1157F ADVNC CARE PLAN IN RCRD: CPT | Mod: S$GLB,,, | Performed by: INTERNAL MEDICINE

## 2017-02-20 PROCEDURE — 99999 PR PBB SHADOW E&M-EST. PATIENT-LVL III: CPT | Mod: PBBFAC,,, | Performed by: INTERNAL MEDICINE

## 2017-02-20 PROCEDURE — 1126F AMNT PAIN NOTED NONE PRSNT: CPT | Mod: S$GLB,,, | Performed by: INTERNAL MEDICINE

## 2017-02-20 NOTE — PROGRESS NOTES
Subjective:    Patient ID:  Nany Crenshaw is a 89 y.o. female who presents for follow-up of Hospital Follow Up      Coronary Artery Disease   Symptoms include muscle weakness.    previous history:  Here for follow-up of DVT.  She feels a little bit better than last time.  She says she's had more return of color in her right lower leg.  She denies any worsening pain or cardiopulmonary complaints.  She's been trying to do a little bit of physical therapy with home health.  Otherwise she's better usual state of health.  Again she's mainly wondering about when she can discontinue her oral anticoagulation.  Otherwise she's had no significant problems with this since we last saw her.    Today:  Here for follow-up of DVT.  She recently went to the emergency department for hypertension.  She was asymptomatic.  She was taken off her hydrochlorothiazide recently as she developed a bladder cyst and became nauseated with vomiting.  She was admitted for hypernatremia and dehydration.  She has had variable blood pressure depending on the medicine she is taking.  When she was in the emergency room her blood pressure was elevated to 150 systolic.  She did not take her blood pressure medicine this morning and her blood pressure is 118 in our clinic.  She denies any worsening cardiopulmonary complaints.  We did find out that she is scheduled for cyst removal and has been off of her blood thinner for a couple of days now.  We told her to resume ASAP post procedure.    Review of Systems   Constitution: Negative.   HENT: Negative.    Eyes: Negative.    Cardiovascular: Negative for dyspnea on exertion, irregular heartbeat, near-syncope, orthopnea, paroxysmal nocturnal dyspnea and syncope.   Skin: Negative.    Musculoskeletal: Positive for joint pain and muscle weakness.   Gastrointestinal: Negative for abdominal pain, constipation and diarrhea.   Genitourinary: Negative for dysuria.   Neurological: Negative.    Psychiatric/Behavioral:  Negative.         Objective:    Physical Exam   Constitutional: She is oriented to person, place, and time. She appears well-developed and well-nourished.   HENT:   Head: Normocephalic and atraumatic.   Eyes: Conjunctivae and EOM are normal. Pupils are equal, round, and reactive to light.   Neck: Normal range of motion. Neck supple. No thyromegaly present.   Cardiovascular: Normal rate and regular rhythm.    No murmur heard.  Pulmonary/Chest: Effort normal and breath sounds normal. No respiratory distress.   Abdominal: Soft. Bowel sounds are normal.   Musculoskeletal: Normal range of motion. She exhibits no edema.   Right calf greater than left calf   Neurological: She is alert and oriented to person, place, and time.   Skin: Skin is warm and dry.   Psychiatric: She has a normal mood and affect. Her behavior is normal.         Assessment:       1. Coronary artery disease due to lipid rich plaque    2. Acute deep vein thrombosis (DVT) of right lower extremity, unspecified vein    3. Benign hypertension    4. Hypothyroidism (acquired)    5. Pure hypercholesterolemia    6. Peripheral vascular disease, unspecified         Plan:       -cont med tx  -cont to follow sx mainly  -Patient is now off of hydrochlorothiazide, take lisinopril 10 mg daily and metoprolol as scheduled  -cont eliquis and repeat ultrasound of the right lower extremity prior to next visit  -Apparently urgent bladder cyst removal surgery, cleared from cardiovascular standpoint but advised of risks off of her oral anticoagulant (*okay to be off of the resume ASAP post procedure for urgent surgery)    RTC as scheduled with venous ultrasound

## 2017-02-20 NOTE — MR AVS SNAPSHOT
[facility]  [address] [place] [address]  Phone: [phone]                  Nany Crenshaw   2017 10:00 AM   Office Visit    Descripción:  Female : 1927   Personal Médico:  Santos Noe MD   Departamento:  [facility] - Cardiology           Razón de la bety     Hospital Follow Up           Diagnósticos de Esta Visita        Comentarios    Coronary artery disease due to lipid rich plaque    -  Primario     Acute deep vein thrombosis (DVT) of right lower extremity, unspecified vein         Benign hypertension         Hypothyroidism (acquired)         Pure hypercholesterolemia         Peripheral vascular disease, unspecified                Lista de tareas           Citas próximas        Personal Médico Departamento Tfno del dpto    2017 8:30 AM Joshua Goldberg, MD [facility] - General Surgery [phone]    3/3/2017 2:30 PM LAB, APPOINTMENT [facility] [phone]    3/3/2017 3:00 PM Ivette Mcdaniel MD [facility] - Internal Medicine [phone]    2017 4:15 PM [facility] US[facility] [phone]    2017 3:00 PM Santos Noe MD [facility] [phone]      Metas (5 Years of Data)     Ninguna      Mohinderandrew en [place]lamada     [facility] L[facility]nea de Enfermeras - Asistencia   Enfermeras registradas de [facility] pueden ayudarle a reservar rey bety, proveer educación para la hugo, asesoría clínica, y otros servicios de asesoramiento.   Llame para yuly servicio gratuito a [phone].             Medicamentos           Mensaje sobre Medicamentos     Verificar los cambios y / o adiciones a flores régimen de medicación son los mismos que discutir con flores médico. Si cualquiera de estos cambios o adiciones son incorrectos, por favor notifique a flores proveedor de atención médica.             Verifique que la siguiente lista de medicamentos es rey representación exacta de los medicamentos que está  "tomando actualmente. Si no hay ningunos reportados, la lista puede estar en banuelos. Si no es correcta, por favor póngase en contacto con flores proveedor de atención médica. Lleve esta lista con usted en salome de emergencia.           Medicamentos Actuales     apixaban 5 mg Tab Take 1 tablet (5 mg total) by mouth 2 (two) times daily.    bimatoprost (LUMIGAN) 0.03 % ophthalmic drops 1 drop every evening.    ezetimibe-simvastatin 10-40 mg (VYTORIN 10-40) 10-40 mg per tablet Take 1 tablet by mouth every evening.    levothyroxine (SYNTHROID) 50 MCG tablet TAKE 1 TABLET BY MOUTH EVERY DAY    lisinopril 10 MG tablet Take 2 tablets (20 mg total) by mouth once daily.    metoprolol tartrate (LOPRESSOR) 25 MG tablet TAKE 1 TABLET BY MOUTH TWICE DAILY           Información de referencia clínica           Shari signos vitales lionel     PS Pulso Pansey Peso Ultima menstruación SpO2    118/59 (BP Location: Left arm, Patient Position: Sitting, BP Method: Automatic) 67 4' 10" (1.473 m) 51 kg (112 lb 6.4 oz) (LMP Unknown) 96%    BMI ([facility] [place])                   23.49 kg/m2           Blood Pressure          Most Recent Value    BP  (!)  118/59      Alergias     A partir del:  2/20/2017        Bactrim [Sulfamethoxazole-trimethoprim]      Vacunas     Administradas en la fecha de la visita:  2/20/2017        None      Language Assistance Services     ATTENTION: Language assistance services are available, free of charge. Please call [phone].      ATENCIÓN: Si habla español, tiene a flores disposición servicios gratuitos de asistencia lingüística. Llame al [phone].     [facility] [place] Ý: N?u b?n nói Ti?ng Vi?t, có các d?ch v? h? tr? ngôn ng? mi?n phí dành cho b?n. G?i s? [phone].         [facility] [place], [place] - Cardiology cumple con las leyes federales aplicables de derechos civiles y no discrimina por motivos de jim, color, origen nacional, edad, discapacidad, o sexo.                 Nany Crenshaw   2/20/2017 10:00 AM   Office Visit    Description:  Female " : 1927   Provider:  Santos Noe MD   Department:  [facility] - Cardiology           Reason for Visit     Hospital Follow Up           Diagnoses this Visit        Comments    Coronary artery disease due to lipid rich plaque    -  Primary     Acute deep vein thrombosis (DVT) of right lower extremity, unspecified vein         Benign hypertension         Hypothyroidism (acquired)         Pure hypercholesterolemia         Peripheral vascular disease, unspecified                To Do List           Future Appointments        Provider Department Dept Phone    2017 8:30 AM Joshua Goldberg, MD [facility] - General Surgery [phone]    3/3/2017 2:30 PM LAB, APPOINTMENT [facility] [phone]    3/3/2017 3:00 PM Ivette Mcdaniel MD [facility] - Internal Medicine [phone]    2017 4:15 PM [facility] USVAS1 [facility] [phone]    2017 3:00 PM Santos Noe MD [facility] Cardiology [phone]      Goals     None      [facility]     [facility] Nurse Care Line -  Assistance  Registered nurses in the [facility] provide clinical advisement, health education, appointment booking, and other advisory services.  Call for this free service at [phone].             Medications           Message regarding Medications     Verify the changes and/or additions to your medication regime listed below are the same as discussed with your clinician today.  If any of these changes or additions are incorrect, please notify your healthcare provider.             Verify that the below list of medications is an accurate representation of the medications you are currently taking.  If none reported, the list may be blank. If incorrect, please contact your healthcare provider. Carry this list with you in case of emergency.           Current Medications     apixaban 5 mg Tab Take 1 tablet (5 mg total) by mouth 2 (two) times daily.     "bimatoprost (LUMIGAN) 0.03 % ophthalmic drops 1 drop every evening.    ezetimibe-simvastatin 10-40 mg (VYTORIN 10-40) 10-40 mg per tablet Take 1 tablet by mouth every evening.    levothyroxine (SYNTHROID) 50 MCG tablet TAKE 1 TABLET BY MOUTH EVERY DAY    lisinopril 10 MG tablet Take 2 tablets (20 mg total) by mouth once daily.    metoprolol tartrate (LOPRESSOR) 25 MG tablet TAKE 1 TABLET BY MOUTH TWICE DAILY           Clinical Reference Information           Your Vitals Were     BP Pulse Height Weight Last Period SpO2    118/59 (BP Location: Left arm, Patient Position: Sitting, BP Method: Automatic) 67 4' 10" (1.473 m) 51 kg (112 lb 6.4 oz) (LMP Unknown) 96%    BMI                   23.49 kg/m2           Blood Pressure          Most Recent Value    BP  (!)  118/59      Allergies as of 2/20/2017     Bactrim [Sulfamethoxazole-trimethoprim]      Immunizations Administered on Date of Encounter - 2/20/2017     None      Language Assistance Services     ATTENTION: Language assistance services are available, free of charge. Please call [phone].      ATENCIÓN: Si habwarren samuel, tiene a flores disposición servicios gratuitos de asistencia lingüística. Llame al [phone].     GRABIEL Ý: N?u b?n nói Ti?ng Vi?t, có các d?ch v? h? tr? ngôn ng? mi?n phí dành cho b?n. G?i s? [phone].         [facility] complies with applicable Federal civil rights laws and does not discriminate on the basis of race, color, national origin, age, disability, or sex.          "

## 2017-04-04 ENCOUNTER — HOSPITAL ENCOUNTER (OUTPATIENT)
Dept: RADIOLOGY | Facility: HOSPITAL | Age: 82
Discharge: HOME | End: 2017-04-04
Attending: INTERNAL MEDICINE
Payer: MEDICARE

## 2017-04-04 DIAGNOSIS — I73.9 PERIPHERAL VASCULAR DISEASE, UNSPECIFIED: ICD-10-CM

## 2017-04-04 DIAGNOSIS — I82.533: ICD-10-CM

## 2017-04-04 DIAGNOSIS — I82.401 ACUTE DEEP VEIN THROMBOSIS (DVT) OF RIGHT LOWER EXTREMITY, UNSPECIFIED VEIN: ICD-10-CM

## 2017-04-04 PROCEDURE — 93971 EXTREMITY STUDY: CPT | Mod: TC,RT

## 2017-04-04 PROCEDURE — 93971 EXTREMITY STUDY: CPT | Mod: 26,RT,, | Performed by: RADIOLOGY

## 2017-04-07 RX ORDER — LEVOTHYROXINE SODIUM 50 UG/1
TABLET ORAL
Qty: 90 TABLET | Refills: 2 | Status: SHIPPED | OUTPATIENT
Start: 2017-04-07 | End: 2017-04-21

## 2017-04-16 RX ORDER — EZETIMIBE AND SIMVASTATIN 10; 40 MG/1; MG/1
TABLET ORAL
Qty: 90 TABLET | Refills: 0 | Status: SHIPPED | OUTPATIENT
Start: 2017-04-16 | End: 2017-08-11

## 2017-04-21 ENCOUNTER — OFFICE VISIT (OUTPATIENT)
Dept: CARDIOLOGY | Facility: CLINIC | Age: 82
End: 2017-04-21
Payer: MEDICARE

## 2017-04-21 VITALS
HEART RATE: 71 BPM | BODY MASS INDEX: 24.98 KG/M2 | HEIGHT: 58 IN | DIASTOLIC BLOOD PRESSURE: 67 MMHG | OXYGEN SATURATION: 94 % | SYSTOLIC BLOOD PRESSURE: 146 MMHG | WEIGHT: 119 LBS

## 2017-04-21 DIAGNOSIS — I82.401 ACUTE DEEP VEIN THROMBOSIS (DVT) OF RIGHT LOWER EXTREMITY, UNSPECIFIED VEIN: ICD-10-CM

## 2017-04-21 DIAGNOSIS — E03.9 HYPOTHYROIDISM (ACQUIRED): ICD-10-CM

## 2017-04-21 DIAGNOSIS — I25.10 CORONARY ARTERY DISEASE DUE TO LIPID RICH PLAQUE: Primary | ICD-10-CM

## 2017-04-21 DIAGNOSIS — I10 BENIGN HYPERTENSION: ICD-10-CM

## 2017-04-21 DIAGNOSIS — I73.9 PERIPHERAL VASCULAR DISEASE, UNSPECIFIED: ICD-10-CM

## 2017-04-21 DIAGNOSIS — E78.00 PURE HYPERCHOLESTEROLEMIA: ICD-10-CM

## 2017-04-21 DIAGNOSIS — I25.83 CORONARY ARTERY DISEASE DUE TO LIPID RICH PLAQUE: Primary | ICD-10-CM

## 2017-04-21 PROCEDURE — 1159F MED LIST DOCD IN RCRD: CPT | Mod: S$GLB,,, | Performed by: INTERNAL MEDICINE

## 2017-04-21 PROCEDURE — 99999 PR PBB SHADOW E&M-EST. PATIENT-LVL III: CPT | Mod: PBBFAC,,, | Performed by: INTERNAL MEDICINE

## 2017-04-21 PROCEDURE — 99214 OFFICE O/P EST MOD 30 MIN: CPT | Mod: S$GLB,,, | Performed by: INTERNAL MEDICINE

## 2017-04-21 PROCEDURE — 1160F RVW MEDS BY RX/DR IN RCRD: CPT | Mod: S$GLB,,, | Performed by: INTERNAL MEDICINE

## 2017-04-21 PROCEDURE — 1126F AMNT PAIN NOTED NONE PRSNT: CPT | Mod: S$GLB,,, | Performed by: INTERNAL MEDICINE

## 2017-04-21 RX ORDER — ESCITALOPRAM OXALATE 5 MG/1
TABLET ORAL
Refills: 4 | COMMUNITY
Start: 2017-02-28 | End: 2017-08-11

## 2017-04-21 NOTE — PROGRESS NOTES
Subjective:    Patient ID:  Nany Crenshaw is a 89 y.o. female who presents for follow-up of No chief complaint on file.      Coronary Artery Disease   Symptoms include muscle weakness.    previous history:  Here for follow-up of DVT.  She recently went to the emergency department for hypertension.  She was asymptomatic.  She was taken off her hydrochlorothiazide recently as she developed a bladder cyst and became nauseated with vomiting.  She was admitted for hypernatremia and dehydration.  She has had variable blood pressure depending on the medicine she is taking.  When she was in the emergency room her blood pressure was elevated to 150 systolic.  She did not take her blood pressure medicine this morning and her blood pressure is 118 in our clinic.  She denies any worsening cardiopulmonary complaints.  We did find out that she is scheduled for cyst removal and has been off of her blood thinner for a couple of days now.  We told her to resume ASAP post procedure.    Today:  Here for follow-up of DVT.  She denies any cardiopulmonary complaints.  She had a repeat ultrasound that showed resolution of her DVT.  We discussed coming off of the oral anticoagulant.  We also had some recommendations for travel including wearing compression stockings.  She has extra pills she can also take the blood thinner during travel.  We told her also to get up and exercise as much as possible.      Review of Systems   Constitution: Negative.   HENT: Negative.    Eyes: Negative.    Cardiovascular: Negative for dyspnea on exertion, irregular heartbeat, near-syncope, orthopnea, paroxysmal nocturnal dyspnea and syncope.   Skin: Negative.    Musculoskeletal: Positive for joint pain and muscle weakness.   Gastrointestinal: Negative for abdominal pain, constipation and diarrhea.   Genitourinary: Negative for dysuria.   Neurological: Negative.    Psychiatric/Behavioral: Negative.         Objective:    Physical Exam   Constitutional: She is  oriented to person, place, and time. She appears well-developed and well-nourished.   HENT:   Head: Normocephalic and atraumatic.   Eyes: Conjunctivae and EOM are normal. Pupils are equal, round, and reactive to light.   Neck: Normal range of motion. Neck supple. No thyromegaly present.   Cardiovascular: Normal rate and regular rhythm.    No murmur heard.  Pulmonary/Chest: Effort normal and breath sounds normal. No respiratory distress.   Abdominal: Soft. Bowel sounds are normal.   Musculoskeletal: Normal range of motion. She exhibits no edema.   Right calf greater than left calf   Neurological: She is alert and oriented to person, place, and time.   Skin: Skin is warm and dry.   Psychiatric: She has a normal mood and affect. Her behavior is normal.       lower extremity ultrasound negative for DVT  Assessment:       1. Coronary artery disease due to lipid rich plaque    2. Acute deep vein thrombosis (DVT) of right lower extremity, unspecified vein    3. Benign hypertension    4. Hypothyroidism (acquired)    5. Pure hypercholesterolemia    6. Peripheral vascular disease, unspecified         Plan:       -cont med tx  -cont to follow sx mainly  -She will resume hydrochlorothiazide  -Okay to DC oral anticoagulant    RTC 6 months

## 2017-04-21 NOTE — MR AVS SNAPSHOT
[facility]  [address] [facility] [address] [place] [address]  Phone: [phone]                  Nany Crenshaw   2017 3:20 PM   Office Visit    Descripción:  Female : 1927   Personal Médico:  Santos Noe MD   Departamento:  [facility]           Razón de la bety     Coronary Artery Disease           Diagnósticos de Esta Visita        Comentarios    Coronary artery disease due to lipid rich plaque    -  Primario     Acute deep vein thrombosis (DVT) of right lower extremity, unspecified vein         Benign hypertension         Hypothyroidism (acquired)         Pure hypercholesterolemia         Peripheral vascular disease, unspecified                Lista de tareas           Metas (5 Years of Data)     Ninguna      [facility]     [facility] [place] Enfermeras - Asistencia   Enfermeras registradas de [place] pueden ayudarle a reservar rey bety, proveer educación para la hugo, asesoría clínica, y otros servicios de asesoramiento.   Llame para yuly servicio gratuito a [phone].             Medicamentos           Mensaje sobre Medicamentos     Verificar los cambios y / o adiciones a flores régimen de medicación son los mismos que discutir con flores médico. Si cualquiera de estos cambios o adiciones son incorrectos, por favor notifique a flores proveedor de atención médica.        DEJAR de brendon estos medicamentos     levothyroxine (SYNTHROID) 50 MCG tablet TAKE 1 TABLET BY MOUTH EVERY DAY           Verifique que la siguiente lista de medicamentos es rey representación exacta de los medicamentos que está tomando actualmente. Si no hay ningunos reportados, la lista puede estar en banuelos. Si no es correcta, por favor póngase en contacto con flores proveedor de atención médica. Lleve esta lista con usted en salome de emergencia.           Medicamentos Actuales     apixaban 5 mg Tab Take 1 tablet (5 mg total) by mouth 2 (two) times daily.    bimatoprost (LUMIGAN) 0.03 %  "ophthalmic drops 1 drop every evening.    escitalopram oxalate (LEXAPRO) 5 MG Tab TK 1 T PO D    lisinopril 10 MG tablet Take 2 tablets (20 mg total) by mouth once daily.    metoprolol tartrate (LOPRESSOR) 25 MG tablet TAKE 1 TABLET BY MOUTH TWICE DAILY    VYTORIN 10-40 10-40 mg per tablet TAKE 1 TABLET BY MOUTH EVERY EVENING           Información de referencia clínica           Shari signos vitales lionel     PS [place] Ultima menstruación SpO2    146/67 (BP Location: Left arm, Patient Position: Sitting, BP Method: Automatic) 71 4' 10" (1.473 m) 54 kg (119 lb) (LMP Unknown) 94%    BMI ([facility])                   24.87 kg/m2           Blood Pressure          Most Recent Value    BP  (!)  146/67      Alergias     A partir del:  2017        Bactrim [Sulfamethoxazole-trimethoprim]      Vacunas     Administradas en la fecha de la visita:  2017        None      Language Assistance Services     ATTENTION: Language assistance services are available, free of charge. Please call [phone].      ATENCIÓN: Si habla español, tiene a flores disposición servicios gratuitos de asistencia lingüística. Llame al [phone].     CHÚ Ý: N?u b?n nói Ti?ng Vi?t, có các d?ch v? h? tr? ngôn ng? mi?n phí dành cho b?n. G?i s? [phone].         [facility] - Cardiology cumple con las leyes federales aplicables de derechos civiles y no discrimina por motivos de jim, color, origen nacional, edad, discapacidad, o sexo.                 Nany Crenshaw   2017 3:20 PM   Office Visit    Description:  Female : 1927   Provider:  Santos Noe MD   Department:  [facility] - Cardiology           Reason for Visit     Coronary Artery Disease           Diagnoses this Visit        Comments    Coronary artery disease due to lipid rich plaque    -  Primary     Acute deep vein thrombosis (DVT) of right lower extremity, unspecified vein         Benign hypertension         Hypothyroidism (acquired)         Pure " "hypercholesterolemia         Peripheral vascular disease, unspecified                To Do List           Goals     None      [facility]     [facility] Nurse Care Line - 24/7 Assistance  Unless otherwise directed by your provider, please contact [facility], our nurse care line that is available for 24/7 assistance.     Registered nurses in the [facility] provide: appointment scheduling, clinical advisement, health education, and other advisory services.  Call: [phone] (toll free)               Medications           Message regarding Medications     Verify the changes and/or additions to your medication regime listed below are the same as discussed with your clinician today.  If any of these changes or additions are incorrect, please notify your healthcare provider.        STOP taking these medications     levothyroxine (SYNTHROID) 50 MCG tablet TAKE 1 TABLET BY MOUTH EVERY DAY           Verify that the below list of medications is an accurate representation of the medications you are currently taking.  If none reported, the list may be blank. If incorrect, please contact your healthcare provider. Carry this list with you in case of emergency.           Current Medications     apixaban 5 mg Tab Take 1 tablet (5 mg total) by mouth 2 (two) times daily.    bimatoprost (LUMIGAN) 0.03 % ophthalmic drops 1 drop every evening.    escitalopram oxalate (LEXAPRO) 5 MG Tab TK 1 T PO D    lisinopril 10 MG tablet Take 2 tablets (20 mg total) by mouth once daily.    metoprolol tartrate (LOPRESSOR) 25 MG tablet TAKE 1 TABLET BY MOUTH TWICE DAILY    VYTORIN 10-40 10-40 mg per tablet TAKE 1 TABLET BY MOUTH EVERY EVENING           Clinical Reference Information           Your Vitals Were     BP Pulse Height Weight Last Period SpO2    146/67 (BP Location: Left arm, Patient Position: Sitting, BP Method: Automatic) 71 4' 10" (1.473 m) 54 kg (119 lb) (LMP Unknown) 94%    BMI                   24.87 kg/m2  "          Blood Pressure          Most Recent Value    BP  (!)  146/67      Allergies as of 4/21/2017     Bactrim [Sulfamethoxazole-trimethoprim]      Immunizations Administered on Date of Encounter - 4/21/2017     None      Language Assistance Services     ATTENTION: Language assistance services are available, free of charge. Please call [phone].      ATENCIÓN: Si habla jarochoañol, tiene a flores disposición servicios gratuitos de asistencia lingüística. Llame al [phone].     CHÚ Ý: N?u b?n nói Ti?ng Vi?t, có các d?ch v? h? tr? ngôn ng? mi?n phí dành cho b?n. G?i s? [phone].         [facility] complies with applicable Federal civil rights laws and does not discriminate on the basis of race, color, national origin, age, disability, or sex.

## 2017-06-18 RX ORDER — HYDROCHLOROTHIAZIDE 12.5 MG/1
CAPSULE ORAL
Qty: 90 CAPSULE | Refills: 0 | Status: SHIPPED | OUTPATIENT
Start: 2017-06-18 | End: 2017-08-11

## 2017-07-12 RX ORDER — METOPROLOL TARTRATE 25 MG/1
TABLET, FILM COATED ORAL
Qty: 180 TABLET | Refills: 3 | Status: SHIPPED | OUTPATIENT
Start: 2017-07-12 | End: 2018-07-18 | Stop reason: SDUPTHER

## 2017-07-14 RX ORDER — EZETIMIBE AND SIMVASTATIN 10; 40 MG/1; MG/1
1 TABLET ORAL NIGHTLY
Qty: 90 TABLET | Refills: 1 | Status: SHIPPED | OUTPATIENT
Start: 2017-07-14 | End: 2018-02-16 | Stop reason: ALTCHOICE

## 2017-08-01 ENCOUNTER — TELEPHONE (OUTPATIENT)
Dept: INTERNAL MEDICINE | Facility: CLINIC | Age: 82
End: 2017-08-01

## 2017-08-11 ENCOUNTER — OFFICE VISIT (OUTPATIENT)
Dept: INTERNAL MEDICINE | Facility: CLINIC | Age: 82
End: 2017-08-11
Payer: MEDICARE

## 2017-08-11 VITALS
DIASTOLIC BLOOD PRESSURE: 72 MMHG | HEART RATE: 68 BPM | WEIGHT: 117.94 LBS | HEIGHT: 60 IN | SYSTOLIC BLOOD PRESSURE: 122 MMHG | BODY MASS INDEX: 23.16 KG/M2

## 2017-08-11 DIAGNOSIS — E03.9 HYPOTHYROIDISM, UNSPECIFIED TYPE: ICD-10-CM

## 2017-08-11 DIAGNOSIS — Z12.31 OTHER SCREENING MAMMOGRAM: ICD-10-CM

## 2017-08-11 DIAGNOSIS — Z00.00 ANNUAL PHYSICAL EXAM: Primary | ICD-10-CM

## 2017-08-11 DIAGNOSIS — Z23 NEED FOR VACCINATION WITH 13-POLYVALENT PNEUMOCOCCAL CONJUGATE VACCINE: ICD-10-CM

## 2017-08-11 DIAGNOSIS — L98.9 SKIN LESION: ICD-10-CM

## 2017-08-11 DIAGNOSIS — I10 ESSENTIAL HYPERTENSION: ICD-10-CM

## 2017-08-11 DIAGNOSIS — Z78.0 POSTMENOPAUSAL: ICD-10-CM

## 2017-08-11 PROCEDURE — 90670 PCV13 VACCINE IM: CPT | Mod: S$GLB,,, | Performed by: INTERNAL MEDICINE

## 2017-08-11 PROCEDURE — G0009 ADMIN PNEUMOCOCCAL VACCINE: HCPCS | Mod: S$GLB,,, | Performed by: INTERNAL MEDICINE

## 2017-08-11 PROCEDURE — 99999 PR PBB SHADOW E&M-EST. PATIENT-LVL IV: CPT | Mod: PBBFAC,,, | Performed by: INTERNAL MEDICINE

## 2017-08-11 PROCEDURE — 99499 UNLISTED E&M SERVICE: CPT | Mod: S$GLB,,, | Performed by: INTERNAL MEDICINE

## 2017-08-11 PROCEDURE — 99397 PER PM REEVAL EST PAT 65+ YR: CPT | Mod: S$GLB,,, | Performed by: INTERNAL MEDICINE

## 2017-08-11 RX ORDER — LEVOTHYROXINE SODIUM 50 UG/1
TABLET ORAL
Refills: 2 | COMMUNITY
Start: 2017-07-07 | End: 2019-01-24 | Stop reason: SDUPTHER

## 2017-08-11 RX ORDER — ASPIRIN 81 MG/1
81 TABLET ORAL DAILY
COMMUNITY
End: 2017-12-13

## 2017-08-11 NOTE — PROGRESS NOTES
Subjective:       Patient ID: Nany Crenshaw is a 89 y.o. female.    Chief Complaint: Follow-up   this is an 89-year-old who presents today for follow-up she is brought in by her daughter.  Patient overall is been doing well she has had pretty good blood pressure readings in general she'll have occasional mild fluctuations she is currently taking metoprolol 25 mg along with lisinopril 10 mg 1-1/2 tablets a day she also takes her thyroid medication.  She has an cholesterol medicine Vytorin.  She's not been taking diuretic . She had hyponatremia previously . she denies increased fluid retention.  Patient denies current chest pain or shortness of breath she completed her several toe and her on occasion was stopped by her cardiologist.  She's had no further redness or swelling.  Her groin lump resolved and she has no drainage or concerns.  She does have some skin lesions and would like to see dermatologist she has an area on the nose that she's been wearing a Band-Aid on under the bridge of the nose where she wears her glasses     HPI  Review of Systems   Constitutional: Negative for fever.   Respiratory: Negative for cough, shortness of breath and wheezing.    Cardiovascular: Negative for chest pain and palpitations.   Gastrointestinal: Negative for abdominal pain and constipation.   Skin:        Skin lesions    Neurological: Negative for dizziness.       Objective:     Blood pressure 122/72, pulse 68, height 5' (1.524 m), weight 53.5 kg (117 lb 15.1 oz).    Physical Exam   Constitutional: No distress.   HENT:   Head: Normocephalic.   Mouth/Throat: Oropharynx is clear and moist.   Eyes: No scleral icterus.   Neck: Neck supple.   Cardiovascular: Normal rate, regular rhythm and normal heart sounds.  Exam reveals no gallop and no friction rub.    No murmur heard.  Pulmonary/Chest: Effort normal and breath sounds normal. No respiratory distress.   Breast : normal no masses or tenderness    Abdominal: Soft. Bowel sounds are  normal. She exhibits no mass. There is no tenderness.   Musculoskeletal: She exhibits no edema.   No edema    Neurological: She is alert.   Skin: No erythema.   Skin lesions  Nasal bridge irritated by bandaid    Psychiatric: She has a normal mood and affect.   Vitals reviewed.      Assessment:       1. Annual physical exam    2. Essential hypertension    3. Hypothyroidism, unspecified type    4. Postmenopausal    5. Need for vaccination with 13-polyvalent pneumococcal conjugate vaccine    6. Skin lesion    7. Other screening mammogram        Plan:       Nany was seen today for follow-up.    Diagnoses and all orders for this visit:    Annual physical exam    Essential hypertension  Blood pressure acceptable today continue present regimen she has remained off diuretic due to hyponatremia in the past continue home blood pressure monitoring we discussed adjustment of her lisinopril if needed she is presently taking 10 mg 1-1/2 tablets a day  -     Basic metabolic panel; Future  -     CBC auto differential; Future  -     Hepatic function panel; Future  -     Lipid panel; Future  -     TSH; Future  -     T4, free; Future    Hypothyroidism, unspecified type  Euthyroid continue present regimen update labs and review  -     TSH; Future  -     T4, free; Future    Postmenopausal  -     DXA Bone Density Spine And Hip; Future    Need for vaccination with 13-polyvalent pneumococcal conjugate vaccine  -     (In Office Administered) Pneumococcal Conjugate Vaccine (13 Valent) (IM)    Skin lesion  She will avoid wearing Band-Aid to the bridge of the nose see if lesion improves we recommended  -     Ambulatory referral to Dermatology    Other screening mammogram  -     Mammo Digital Screening Bilateral With CAD; Future    Follow-up 6 months sooner if concern

## 2017-08-18 ENCOUNTER — HOSPITAL ENCOUNTER (OUTPATIENT)
Dept: RADIOLOGY | Facility: CLINIC | Age: 82
Discharge: HOME | End: 2017-08-18
Attending: INTERNAL MEDICINE
Payer: MEDICARE

## 2017-08-18 DIAGNOSIS — Z78.0 POSTMENOPAUSAL: ICD-10-CM

## 2017-08-18 PROCEDURE — 77080 DXA BONE DENSITY AXIAL: CPT | Mod: 26,,, | Performed by: INTERNAL MEDICINE

## 2017-08-18 PROCEDURE — 77080 DXA BONE DENSITY AXIAL: CPT | Mod: TC

## 2017-09-23 ENCOUNTER — LAB VISIT (OUTPATIENT)
Dept: LAB | Facility: HOSPITAL | Age: 82
End: 2017-09-23
Attending: INTERNAL MEDICINE
Payer: MEDICARE

## 2017-09-23 DIAGNOSIS — E03.9 HYPOTHYROIDISM, UNSPECIFIED TYPE: ICD-10-CM

## 2017-09-23 DIAGNOSIS — I10 ESSENTIAL HYPERTENSION: ICD-10-CM

## 2017-09-23 LAB
ALBUMIN SERPL BCP-MCNC: 3.4 G/DL
ALP SERPL-CCNC: 77 U/L
ALT SERPL W/O P-5'-P-CCNC: 24 U/L
ANION GAP SERPL CALC-SCNC: 7 MMOL/L
AST SERPL-CCNC: 28 U/L
BASOPHILS # BLD AUTO: 0.03 K/UL
BASOPHILS NFR BLD: 0.6 %
BILIRUB DIRECT SERPL-MCNC: 0.5 MG/DL
BILIRUB SERPL-MCNC: 1.4 MG/DL
BUN SERPL-MCNC: 10 MG/DL
CALCIUM SERPL-MCNC: 9.6 MG/DL
CHLORIDE SERPL-SCNC: 105 MMOL/L
CHOLEST SERPL-MCNC: 213 MG/DL
CHOLEST/HDLC SERPL: 2.2 {RATIO}
CO2 SERPL-SCNC: 28 MMOL/L
CREAT SERPL-MCNC: 0.7 MG/DL
DIFFERENTIAL METHOD: NORMAL
EOSINOPHIL # BLD AUTO: 0 K/UL
EOSINOPHIL NFR BLD: 0.8 %
ERYTHROCYTE [DISTWIDTH] IN BLOOD BY AUTOMATED COUNT: 14 %
EST. GFR  (AFRICAN AMERICAN): >60 ML/MIN/1.73 M^2
EST. GFR  (NON AFRICAN AMERICAN): >60 ML/MIN/1.73 M^2
GLUCOSE SERPL-MCNC: 94 MG/DL
HCT VFR BLD AUTO: 42.5 %
HDLC SERPL-MCNC: 96 MG/DL
HDLC SERPL: 45.1 %
HGB BLD-MCNC: 14.1 G/DL
LDLC SERPL CALC-MCNC: 97.8 MG/DL
LYMPHOCYTES # BLD AUTO: 1.3 K/UL
LYMPHOCYTES NFR BLD: 25.5 %
MCH RBC QN AUTO: 29.9 PG
MCHC RBC AUTO-ENTMCNC: 33.2 G/DL
MCV RBC AUTO: 90 FL
MONOCYTES # BLD AUTO: 0.3 K/UL
MONOCYTES NFR BLD: 6.7 %
NEUTROPHILS # BLD AUTO: 3.4 K/UL
NEUTROPHILS NFR BLD: 66.2 %
NONHDLC SERPL-MCNC: 117 MG/DL
PLATELET # BLD AUTO: 243 K/UL
PMV BLD AUTO: 9.3 FL
POTASSIUM SERPL-SCNC: 5 MMOL/L
PROT SERPL-MCNC: 6.4 G/DL
RBC # BLD AUTO: 4.71 M/UL
SODIUM SERPL-SCNC: 140 MMOL/L
T4 FREE SERPL-MCNC: 0.93 NG/DL
TRIGL SERPL-MCNC: 96 MG/DL
TSH SERPL DL<=0.005 MIU/L-ACNC: 1.83 UIU/ML
WBC # BLD AUTO: 5.09 K/UL

## 2017-09-23 PROCEDURE — 85025 COMPLETE CBC W/AUTO DIFF WBC: CPT

## 2017-09-23 PROCEDURE — 84439 ASSAY OF FREE THYROXINE: CPT

## 2017-09-23 PROCEDURE — 80061 LIPID PANEL: CPT

## 2017-09-23 PROCEDURE — 80076 HEPATIC FUNCTION PANEL: CPT

## 2017-09-23 PROCEDURE — 80048 BASIC METABOLIC PNL TOTAL CA: CPT

## 2017-09-23 PROCEDURE — 84443 ASSAY THYROID STIM HORMONE: CPT

## 2017-09-23 PROCEDURE — 36415 COLL VENOUS BLD VENIPUNCTURE: CPT | Mod: PO

## 2017-10-27 ENCOUNTER — INITIAL CONSULT (OUTPATIENT)
Dept: DERMATOLOGY | Facility: CLINIC | Age: 82
End: 2017-10-27
Payer: MEDICARE

## 2017-10-27 DIAGNOSIS — L81.4 LENTIGINES: ICD-10-CM

## 2017-10-27 DIAGNOSIS — L57.0 ACTINIC KERATOSES: ICD-10-CM

## 2017-10-27 DIAGNOSIS — L82.1 SEBORRHEIC KERATOSES: Primary | ICD-10-CM

## 2017-10-27 DIAGNOSIS — L85.3 XEROSIS CUTIS: ICD-10-CM

## 2017-10-27 DIAGNOSIS — D48.5 NEOPLASM OF UNCERTAIN BEHAVIOR OF SKIN: ICD-10-CM

## 2017-10-27 PROCEDURE — 99202 OFFICE O/P NEW SF 15 MIN: CPT | Mod: 25,S$GLB,, | Performed by: DERMATOLOGY

## 2017-10-27 PROCEDURE — 99999 PR PBB SHADOW E&M-EST. PATIENT-LVL II: CPT | Mod: PBBFAC,,, | Performed by: DERMATOLOGY

## 2017-10-27 PROCEDURE — 11100 PR BIOPSY OF SKIN LESION: CPT | Mod: S$GLB,,, | Performed by: DERMATOLOGY

## 2017-10-27 PROCEDURE — 88305 TISSUE EXAM BY PATHOLOGIST: CPT | Performed by: PATHOLOGY

## 2017-10-27 NOTE — PROGRESS NOTES
Subjective:       Patient ID:  Nany Crenshaw is a 89 y.o. female who presents for   Chief Complaint   Patient presents with    Skin Check     arms,face     89 yo F with PMH of AKs, who presents today for red spot on her left nose and red rough spots on her face, arms, and upper back.   She reports red spot on her left nose x years. Intermittently bleeds. Believes it has increased in size. Denies itching. Denies previous treatment.   She also reports red rough spots on her face, arms, and upper back x years. Denies symptoms. Denies treatment of current lesions. She previously had similar lesions treated with LN2.       Past Medical History:   Diagnosis Date    Colon polyp     no further colon needed    Coronary artery disease     Diverticulosis     Fracture of right distal radius     GERD (gastroesophageal reflux disease)     Hypertension     Hypothyroidism (acquired)     Osteoporosis, post-menopausal     Scoliosis      Review of Systems   Constitutional: Negative for fever and chills.   Skin: Positive for dry skin and activity-related sunscreen use. Negative for itching, daily sunscreen use and recent sunburn.   Hematologic/Lymphatic: Does not bruise/bleed easily.        Objective:    Physical Exam   Constitutional: She appears well-developed and well-nourished. No distress.   Neurological: She is alert and oriented to person, place, and time. She is not disoriented.   Psychiatric: She has a normal mood and affect.   Skin:   Areas Examined (abnormalities noted in diagram):   Head / Face Inspection Performed  Neck Inspection Performed  Chest / Axilla Inspection Performed  Back Inspection Performed  RUE Inspected  LUE Inspection Performed  RLE Inspected  LLE Inspection Performed                Xerosis of bilateral arms               Diagram Legend     Erythematous scaling macule/papule c/w actinic keratosis       Vascular papule c/w angioma      Pigmented verrucoid papule/plaque c/w seborrheic keratosis       Yellow umbilicated papule c/w sebaceous hyperplasia      Irregularly shaped tan macule c/w lentigo     1-2 mm smooth white papules consistent with Milia      Movable subcutaneous cyst with punctum c/w epidermal inclusion cyst      Subcutaneous movable cyst c/w pilar cyst      Firm pink to brown papule c/w dermatofibroma      Pedunculated fleshy papule(s) c/w skin tag(s)      Evenly pigmented macule c/w junctional nevus     Mildly variegated pigmented, slightly irregular-bordered macule c/w mildly atypical nevus      Flesh colored to evenly pigmented papule c/w intradermal nevus       Pink pearly papule/plaque c/w basal cell carcinoma      Erythematous hyperkeratotic cursted plaque c/w SCC      Surgical scar with no sign of skin cancer recurrence      Open and closed comedones      Inflammatory papules and pustules      Verrucoid papule consistent consistent with wart     Erythematous eczematous patches and plaques     Dystrophic onycholytic nail with subungual debris c/w onychomycosis     Umbilicated papule    Erythematous-base heme-crusted tan verrucoid plaque consistent with inflamed seborrheic keratosis     Erythematous Silvery Scaling Plaque c/w Psoriasis     See annotation          Assessment / Plan:      Pathology Orders:      Normal Orders This Visit    Tissue Specimen To Pathology, Dermatology     Questions:    Directional Terms:  Other(comment)    Clinical information:  SCC vs BCC    Specific Site:  Left nasal sidewall        Neoplasm of uncertain behavior of skin  - Tissue Specimen To Pathology, Dermatology  - DDx: SCC vs BCC vs other  - Shave biopsy procedure note:    Shave biopsy performed after verbal consent including risk of infection, scar, recurrence, need for additional treatment of site. Area prepped with alcohol, anesthetized with approximately 1.0cc of 1% lidocaine with epinephrine. Lesional tissue shaved with razor blade. Hemostasis achieved with application of aluminum chloride. No  complications. Dressing applied. Wound care explained.    Seborrheic keratoses (trunk, UE)  - These are benign inherited growths without a malignant potential. Reassurance given to patient. No treatment is necessary.     Lentigines (trunk, UE)  - This is a benign hyperpigmented sun induced lesion. Daily sun protection will reduce the number of new lesions. Treatment of these benign lesions are considered cosmetic.    Actinic keratoses (face, upper back, UE)  - Defer treatment (cryotherapy vs efudex vs PDT) until f/u appointment and results of biopsy    Xerosis (UE)  - dry skin precautions  - moisturize BID     Call daughter Janie on mobile # (in snapshot)

## 2017-10-27 NOTE — LETTER
October 29, 2017      Ivette Mcdaniel MD  [address]  [place] [address]           [facility] - [facility]at[facility]  [address]  [place] [address]  Phone: [phone]  Fax: [phone]          Patient: Nany Crenshaw   MR Number: [id number]   YOB: 1927   Date of Visit: 10/27/2017       Dear Dr. Ivette Mcdaniel:    Thank you for referring Nany Crenshaw to me for evaluation. Attached you will find relevant portions of my assessment and plan of care.    If you have questions, please do not hesitate to call me. I look forward to following Nany Crenshaw along with you.    Sincerely,    Keisha Harman MD    Enclosure  CC:  No Recipients    If you would like to receive this communication electronically, please contact [email or web address] or [phone] to request more information on [facility] [employer] access.    For providers and/or their staff who would like to refer a patient to [facility], please contact us through our one-stop-shop provider referral line, [facility], at [phone].    If you feel you have received this communication in error or would no longer like to receive these types of communications, please e-mail [email or web address]

## 2017-11-02 ENCOUNTER — OFFICE VISIT (OUTPATIENT)
Dept: CARDIOLOGY | Facility: CLINIC | Age: 82
End: 2017-11-02
Payer: MEDICARE

## 2017-11-02 VITALS
HEART RATE: 73 BPM | WEIGHT: 116.88 LBS | BODY MASS INDEX: 22.82 KG/M2 | SYSTOLIC BLOOD PRESSURE: 137 MMHG | OXYGEN SATURATION: 96 % | DIASTOLIC BLOOD PRESSURE: 60 MMHG

## 2017-11-02 DIAGNOSIS — I82.591 CHRONIC EMBOLISM AND THROMBOSIS OF OTHER SPECIFIED DEEP VEIN OF RIGHT LOWER EXTREMITY: ICD-10-CM

## 2017-11-02 DIAGNOSIS — I25.83 CORONARY ARTERY DISEASE DUE TO LIPID RICH PLAQUE: Primary | ICD-10-CM

## 2017-11-02 DIAGNOSIS — I10 BENIGN HYPERTENSION: ICD-10-CM

## 2017-11-02 DIAGNOSIS — E03.9 HYPOTHYROIDISM (ACQUIRED): ICD-10-CM

## 2017-11-02 DIAGNOSIS — I25.10 CORONARY ARTERY DISEASE DUE TO LIPID RICH PLAQUE: Primary | ICD-10-CM

## 2017-11-02 DIAGNOSIS — E78.00 PURE HYPERCHOLESTEROLEMIA: ICD-10-CM

## 2017-11-02 PROCEDURE — 99499 UNLISTED E&M SERVICE: CPT | Mod: S$GLB,,, | Performed by: INTERNAL MEDICINE

## 2017-11-02 PROCEDURE — 99999 PR PBB SHADOW E&M-EST. PATIENT-LVL III: CPT | Mod: PBBFAC,,, | Performed by: INTERNAL MEDICINE

## 2017-11-02 PROCEDURE — 99214 OFFICE O/P EST MOD 30 MIN: CPT | Mod: S$GLB,,, | Performed by: INTERNAL MEDICINE

## 2017-11-02 NOTE — PROGRESS NOTES
Subjective:    Patient ID:  Nany Crenshaw is a 89 y.o. female who presents for follow-up of Follow-up (6 months )      Coronary Artery Disease   Symptoms include muscle weakness.    previous history:  Here for follow-up of DVT.  She denies any cardiopulmonary complaints.  She had a repeat ultrasound that showed resolution of her DVT.  We discussed coming off of the oral anticoagulant.  We also had some recommendations for travel including wearing compression stockings.  She has extra pills she can also take the blood thinner during travel.  We told her also to get up and exercise as much as possible.    Today:  Here for follow-up of DVT and coronary artery disease.  She's had some inflammation and prominence of her varicosities in the right leg.  She's mainly worried about recurrence of clot of blood thinners.  She denies any worsening cardiopulmonary complaints.  She's not had any chest pain, shortness of breath or palpitations.  She denies any PND, orthopnea or lower edema.  She is not expressing dizziness, presyncope or syncope.      Review of Systems   Constitution: Negative.   HENT: Negative.    Eyes: Negative.    Cardiovascular: Negative for dyspnea on exertion, irregular heartbeat, near-syncope, orthopnea, paroxysmal nocturnal dyspnea and syncope.   Skin: Negative.    Musculoskeletal: Positive for joint pain and muscle weakness.   Gastrointestinal: Negative for abdominal pain, constipation and diarrhea.   Genitourinary: Negative for dysuria.   Neurological: Negative.    Psychiatric/Behavioral: Negative.         Objective:    Physical Exam   Constitutional: She is oriented to person, place, and time. She appears well-developed and well-nourished.   HENT:   Head: Normocephalic and atraumatic.   Eyes: Conjunctivae and EOM are normal. Pupils are equal, round, and reactive to light.   Neck: Normal range of motion. Neck supple. No thyromegaly present.   Cardiovascular: Normal rate and regular rhythm.    No murmur  heard.  Pulmonary/Chest: Effort normal and breath sounds normal. No respiratory distress.   Abdominal: Soft. Bowel sounds are normal.   Musculoskeletal: Normal range of motion. She exhibits no edema.   Prominent varicosities with some inflammation right greater than left   Neurological: She is alert and oriented to person, place, and time.   Skin: Skin is warm and dry.   Psychiatric: She has a normal mood and affect. Her behavior is normal.       lower extremity ultrasound negative for DVT    Assessment:       1. Coronary artery disease due to lipid rich plaque    2. Acute deep vein thrombosis (DVT) of right lower extremity, unspecified vein    3. Benign hypertension    4. Hypothyroidism (acquired)    5. Pure hypercholesterolemia         Plan:       -cont med tx, check echo  -cont to follow sx mainly        RTC 6 months

## 2017-11-09 ENCOUNTER — HOSPITAL ENCOUNTER (OUTPATIENT)
Dept: RADIOLOGY | Facility: HOSPITAL | Age: 82
Discharge: HOME | End: 2017-11-09
Attending: INTERNAL MEDICINE
Payer: MEDICARE

## 2017-11-09 ENCOUNTER — HOSPITAL ENCOUNTER (OUTPATIENT)
Dept: CARDIOLOGY | Facility: HOSPITAL | Age: 82
Discharge: HOME | End: 2017-11-09
Attending: INTERNAL MEDICINE
Payer: MEDICARE

## 2017-11-09 DIAGNOSIS — I25.83 CORONARY ARTERY DISEASE DUE TO LIPID RICH PLAQUE: ICD-10-CM

## 2017-11-09 DIAGNOSIS — I82.591 CHRONIC EMBOLISM AND THROMBOSIS OF OTHER SPECIFIED DEEP VEIN OF RIGHT LOWER EXTREMITY: ICD-10-CM

## 2017-11-09 DIAGNOSIS — I25.10 CORONARY ARTERY DISEASE DUE TO LIPID RICH PLAQUE: ICD-10-CM

## 2017-11-09 PROCEDURE — 93306 TTE W/DOPPLER COMPLETE: CPT

## 2017-11-09 PROCEDURE — 93306 TTE W/DOPPLER COMPLETE: CPT | Mod: 26,,, | Performed by: INTERNAL MEDICINE

## 2017-11-09 PROCEDURE — 93970 EXTREMITY STUDY: CPT | Mod: TC

## 2017-11-09 PROCEDURE — 93970 EXTREMITY STUDY: CPT | Mod: 26,,, | Performed by: RADIOLOGY

## 2017-11-10 LAB
AORTIC VALVE REGURGITATION: NORMAL
DIASTOLIC DYSFUNCTION: NO
ESTIMATED PA SYSTOLIC PRESSURE: 21.32
GLOBAL PERICARDIAL EFFUSION: NORMAL
RETIRED EF AND QEF - SEE NOTES: 55 (ref 55–65)
TRICUSPID VALVE REGURGITATION: NORMAL

## 2017-11-11 ENCOUNTER — PATIENT MESSAGE (OUTPATIENT)
Dept: CARDIOLOGY | Facility: CLINIC | Age: 82
End: 2017-11-11

## 2017-11-16 ENCOUNTER — TELEPHONE (OUTPATIENT)
Dept: INTERNAL MEDICINE | Facility: CLINIC | Age: 82
End: 2017-11-16

## 2017-11-16 NOTE — TELEPHONE ENCOUNTER
----- Message from Paola Harris sent at 11/16/2017  1:51 PM CST -----  Contact: Marisol/Daughter/[phone]  Marisol called in regards needing to talk with Dr Mcdaniel medical assistant about if patient appointment for 11/17/17 can be reschedule later during the date, patient was called by another physician urgent visit tomorrow. Marisol does not want to cancel appointment and would like for her mom to be seen tomorrow (I checked through the system and unable to find an available appointment until January 2018) . Please call and advise.       Thank you

## 2017-11-16 NOTE — TELEPHONE ENCOUNTER
Spoke w pt daughter informed that she can bring her mother in for 1:00pm per Dr. Mcdaniel. Pt daughter verbalized understanding.

## 2017-11-17 ENCOUNTER — IMMUNIZATION (OUTPATIENT)
Dept: INTERNAL MEDICINE | Facility: CLINIC | Age: 82
End: 2017-11-17
Payer: MEDICARE

## 2017-11-17 ENCOUNTER — OFFICE VISIT (OUTPATIENT)
Dept: DERMATOLOGY | Facility: CLINIC | Age: 82
End: 2017-11-17
Payer: MEDICARE

## 2017-11-17 ENCOUNTER — TELEPHONE (OUTPATIENT)
Dept: DERMATOLOGY | Facility: CLINIC | Age: 82
End: 2017-11-17

## 2017-11-17 ENCOUNTER — OFFICE VISIT (OUTPATIENT)
Dept: INTERNAL MEDICINE | Facility: CLINIC | Age: 82
End: 2017-11-17
Payer: MEDICARE

## 2017-11-17 VITALS
SYSTOLIC BLOOD PRESSURE: 116 MMHG | HEART RATE: 68 BPM | HEIGHT: 58 IN | WEIGHT: 117.75 LBS | BODY MASS INDEX: 24.72 KG/M2 | DIASTOLIC BLOOD PRESSURE: 64 MMHG

## 2017-11-17 DIAGNOSIS — C44.92 SQUAMOUS CELL SKIN CANCER: ICD-10-CM

## 2017-11-17 DIAGNOSIS — I82.401 ACUTE DEEP VEIN THROMBOSIS (DVT) OF RIGHT LOWER EXTREMITY, UNSPECIFIED VEIN: ICD-10-CM

## 2017-11-17 DIAGNOSIS — L57.0 ACTINIC KERATOSES: Primary | ICD-10-CM

## 2017-11-17 DIAGNOSIS — M81.0 OSTEOPOROSIS WITHOUT CURRENT PATHOLOGICAL FRACTURE, UNSPECIFIED OSTEOPOROSIS TYPE: ICD-10-CM

## 2017-11-17 DIAGNOSIS — E03.9 HYPOTHYROIDISM (ACQUIRED): ICD-10-CM

## 2017-11-17 DIAGNOSIS — D04.9 SQUAMOUS CELL CARCINOMA IN SITU OF SKIN: ICD-10-CM

## 2017-11-17 DIAGNOSIS — I10 BENIGN HYPERTENSION: Primary | ICD-10-CM

## 2017-11-17 PROCEDURE — 99999 PR PBB SHADOW E&M-EST. PATIENT-LVL II: CPT | Mod: PBBFAC,,, | Performed by: DERMATOLOGY

## 2017-11-17 PROCEDURE — G0008 ADMIN INFLUENZA VIRUS VAC: HCPCS | Mod: S$GLB,,, | Performed by: INTERNAL MEDICINE

## 2017-11-17 PROCEDURE — 99214 OFFICE O/P EST MOD 30 MIN: CPT | Mod: S$GLB,,, | Performed by: INTERNAL MEDICINE

## 2017-11-17 PROCEDURE — 99999 PR PBB SHADOW E&M-EST. PATIENT-LVL III: CPT | Mod: PBBFAC,,, | Performed by: INTERNAL MEDICINE

## 2017-11-17 PROCEDURE — 99499 UNLISTED E&M SERVICE: CPT | Mod: S$GLB,,, | Performed by: INTERNAL MEDICINE

## 2017-11-17 PROCEDURE — 90662 IIV NO PRSV INCREASED AG IM: CPT | Mod: S$GLB,,, | Performed by: INTERNAL MEDICINE

## 2017-11-17 PROCEDURE — 99213 OFFICE O/P EST LOW 20 MIN: CPT | Mod: S$GLB,,, | Performed by: DERMATOLOGY

## 2017-11-17 RX ORDER — FLUOROURACIL 50 MG/G
CREAM TOPICAL
Qty: 40 G | Refills: 1 | Status: SHIPPED | OUTPATIENT
Start: 2017-11-17 | End: 2021-04-04 | Stop reason: CLARIF

## 2017-11-17 NOTE — PROGRESS NOTES
Answers for HPI/ROS submitted by the patient on 11/14/2017   activity change: No  unexpected weight change: No  neck pain: No  hearing loss: No  rhinorrhea: No  trouble swallowing: No  eye discharge: No  visual disturbance: No  chest tightness: No  wheezing: No  chest pain: No  palpitations: No  blood in stool: No  constipation: No  vomiting: No  diarrhea: No  polydipsia: No  polyuria: No  difficulty urinating: No  hematuria: No  menstrual problem: No  dysuria: No  joint swelling: No  arthralgias: No  headaches: No  weakness: No  confusion: No  dysphoric mood: No

## 2017-11-17 NOTE — PROGRESS NOTES
"Subjective:       Patient ID: Nany Crenshaw is a 89 y.o. female.    Chief Complaint: Follow-up  89 year old presetns for follow up. She is brought in by suly to discusse bone density. It showed osteoporosis. She is not interested in starting any medicaitons at this time   Due to her age. She does exercise and take calcium /vit d. Pt has been following with her cardiologist. She reports bp has been good. She had echo recenrtly and follow up   Us which showed reucurance dvt. They report she was placed back on apixiban by her cardiologist. She is also having skin cancer on her dose had biopsy  Has follow up with dermatology      HPI  Review of Systems   Constitutional: Negative for activity change and unexpected weight change.   HENT: Negative for hearing loss, rhinorrhea and trouble swallowing.    Eyes: Negative for discharge and visual disturbance.   Respiratory: Negative for chest tightness and wheezing.    Cardiovascular: Negative for chest pain and palpitations.        Some swelling leg at times    Gastrointestinal: Negative for blood in stool, constipation, diarrhea and vomiting.   Endocrine: Negative for polydipsia and polyuria.   Genitourinary: Negative for difficulty urinating, dysuria, hematuria and menstrual problem.   Musculoskeletal: Negative for arthralgias, joint swelling and neck pain.   Skin:        Skin problem nose    Neurological: Negative for weakness and headaches.   Psychiatric/Behavioral: Negative for confusion and dysphoric mood.       Objective:     Blood pressure 116/64, pulse 68, height 4' 10" (1.473 m), weight 53.4 kg (117 lb 11.6 oz).    Physical Exam   Constitutional: No distress.   HENT:   Head: Normocephalic.   Mouth/Throat: Oropharynx is clear and moist.   Skin lesion nose    Eyes: No scleral icterus.   Neck: Neck supple.   Cardiovascular: Normal rate, regular rhythm and normal heart sounds.  Exam reveals no gallop and no friction rub.    No murmur heard.  Pulmonary/Chest: Effort " normal and breath sounds normal. No respiratory distress.   Abdominal: Soft. Bowel sounds are normal. She exhibits no mass. There is no tenderness.   Musculoskeletal: She exhibits no edema.   Trace edema    Neurological: She is alert.   Skin: No erythema.   Vitals reviewed.      Assessment:       1. Benign hypertension    2. Hypothyroidism (acquired)    3. Acute deep vein thrombosis (DVT) of right lower extremity, unspecified vein    4. Squamous cell skin cancer    5. Osteoporosis without current pathological fracture, unspecified osteoporosis type        Plan:       Nany was seen today for follow-up.    Diagnoses and all orders for this visit:    Benign hypertension  bp acceptable continue current regimin    Hypothyroidism (acquired)  euthriod continue current regimine     Acute deep vein thrombosis (DVT) of right lower extremity, unspecified vein  Pt is followed and treated by her cardiologist  She will follow up with her cardiologist as recommended       Squamous cell skin cancer  She has dermtology follow up planned     Osteoporosis without current pathological fracture, unspecified osteoporosis type  She is not interested in medications at this time    Follow up 4-6 month  Flu shot advised

## 2017-11-17 NOTE — TELEPHONE ENCOUNTER
Spoke with pt's daughter, informed her that it is a stage 0 per Dr. Harman.     ----- Message from Giovanna Crockett sent at 11/16/2017  3:44 PM CST -----  Contact: patient daughter  [phone]-tima-please call above patient daughter wants to know the stage of cancer level for her mother call number in message

## 2017-11-17 NOTE — PROGRESS NOTES
Subjective:       Patient ID:  Nany Crenshaw is a 89 y.o. female who presents for   Chief Complaint   Patient presents with    Results     of biopsy     HPI  89 yo F presents with her daughter for discussion of biopsy results and discussion of mgmt options    Past Medical History:   Diagnosis Date    Colon polyp     no further colon needed    Coronary artery disease     Diverticulosis     Fracture of right distal radius     GERD (gastroesophageal reflux disease)     Hypertension     Hypothyroidism (acquired)     Osteoporosis, post-menopausal     Scoliosis      Review of Systems   Hematologic/Lymphatic: Bruises/bleeds easily.        Objective:    Physical Exam   Constitutional: She appears well-developed and well-nourished. No distress.   Neurological: She is alert and oriented to person, place, and time. She is not disoriented.   Psychiatric: She has a normal mood and affect.   Skin:   Areas Examined (abnormalities noted in diagram):   Head / Face Inspection Performed  Neck Inspection Performed           10/27/17         Diagram Legend    See annotation    PATHOLOGY 10/27/17  Skin, left nasal sidewall, shave biopsy:  - ACTINIC KERATOSIS WITH FOLLICULAR INVOLVEMENT AND FOCAL TRANSITION TO SQUAMOUS CELL  CARCINOMA IN SITU.  - THE ATYPICAL SQUAMOUS EPITHELIUM EXTENDS TO THE BASE OF THE BIOPSY, AND AN UNDERLYING  INVASIVE SQUAMOUS CELL CARCINOMA CANNOT BE EXCLUDED.    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

## 2017-11-20 ENCOUNTER — PATIENT MESSAGE (OUTPATIENT)
Dept: CARDIOLOGY | Facility: CLINIC | Age: 82
End: 2017-11-20

## 2017-12-10 ENCOUNTER — PATIENT MESSAGE (OUTPATIENT)
Dept: INTERNAL MEDICINE | Facility: CLINIC | Age: 82
End: 2017-12-10

## 2017-12-11 DIAGNOSIS — M41.9 SCOLIOSIS, UNSPECIFIED SCOLIOSIS TYPE, UNSPECIFIED SPINAL REGION: Primary | ICD-10-CM

## 2017-12-11 DIAGNOSIS — M54.9 BACK PAIN, UNSPECIFIED BACK LOCATION, UNSPECIFIED BACK PAIN LATERALITY, UNSPECIFIED CHRONICITY: ICD-10-CM

## 2017-12-12 ENCOUNTER — TELEPHONE (OUTPATIENT)
Dept: SPINE | Facility: CLINIC | Age: 82
End: 2017-12-12

## 2017-12-12 DIAGNOSIS — M54.5 LOW BACK PAIN, UNSPECIFIED BACK PAIN LATERALITY, UNSPECIFIED CHRONICITY, WITH SCIATICA PRESENCE UNSPECIFIED: Primary | ICD-10-CM

## 2017-12-13 ENCOUNTER — OFFICE VISIT (OUTPATIENT)
Dept: SPINE | Facility: CLINIC | Age: 82
End: 2017-12-13
Payer: MEDICARE

## 2017-12-13 ENCOUNTER — HOSPITAL ENCOUNTER (OUTPATIENT)
Dept: RADIOLOGY | Facility: OTHER | Age: 82
Discharge: HOME | End: 2017-12-13
Attending: PHYSICIAN ASSISTANT
Payer: MEDICARE

## 2017-12-13 VITALS
HEIGHT: 58 IN | HEART RATE: 55 BPM | WEIGHT: 117 LBS | SYSTOLIC BLOOD PRESSURE: 153 MMHG | DIASTOLIC BLOOD PRESSURE: 68 MMHG | BODY MASS INDEX: 24.56 KG/M2

## 2017-12-13 DIAGNOSIS — M54.5 LOW BACK PAIN, UNSPECIFIED BACK PAIN LATERALITY, UNSPECIFIED CHRONICITY, WITH SCIATICA PRESENCE UNSPECIFIED: ICD-10-CM

## 2017-12-13 DIAGNOSIS — M47.819 SPONDYLOSIS WITHOUT MYELOPATHY: ICD-10-CM

## 2017-12-13 DIAGNOSIS — M51.37 DDD (DEGENERATIVE DISC DISEASE), LUMBOSACRAL: ICD-10-CM

## 2017-12-13 DIAGNOSIS — M25.551 RIGHT HIP PAIN: Primary | ICD-10-CM

## 2017-12-13 DIAGNOSIS — M41.9 ACQUIRED SCOLIOSIS: ICD-10-CM

## 2017-12-13 PROCEDURE — 99499 UNLISTED E&M SERVICE: CPT | Mod: S$GLB,,, | Performed by: PHYSICIAN ASSISTANT

## 2017-12-13 PROCEDURE — 99999 PR PBB SHADOW E&M-EST. PATIENT-LVL IV: CPT | Mod: PBBFAC,,, | Performed by: PHYSICIAN ASSISTANT

## 2017-12-13 PROCEDURE — 72100 X-RAY EXAM L-S SPINE 2/3 VWS: CPT | Mod: TC

## 2017-12-13 PROCEDURE — 72100 X-RAY EXAM L-S SPINE 2/3 VWS: CPT | Mod: 26,,, | Performed by: INTERNAL MEDICINE

## 2017-12-13 PROCEDURE — 99204 OFFICE O/P NEW MOD 45 MIN: CPT | Mod: S$GLB,,, | Performed by: PHYSICIAN ASSISTANT

## 2017-12-13 PROCEDURE — 72120 X-RAY BEND ONLY L-S SPINE: CPT | Mod: 26,,, | Performed by: INTERNAL MEDICINE

## 2017-12-13 NOTE — LETTER
December 13, 2017      Ivette Mcdaniel MD  [address]  [facility] [address]           [facility]  [address], [address]  [facility] [address]  Phone: [phone]  Fax: [phone]          Patient: Nany Crenshaw   MR Number: [id number]   YOB: 1927   Date of Visit: 12/13/2017       Dear Dr. Ivette Mcdaniel:    Thank you for referring Nany Crenshaw to me for evaluation. Attached you will find relevant portions of my assessment and plan of care.    If you have questions, please do not hesitate to call me. I look forward to following Nany Crenshaw along with you.    Sincerely,    Emili Tamez PA-C    Enclosure  CC:  No Recipients    If you would like to receive this communication electronically, please contact [email or web address] or [phone] to request more information on [facility] [employer] access.    For providers and/or their staff who would like to refer a patient to [facility], please contact us through our one-stop-shop provider referral line, [facility], at [phone].    If you feel you have received this communication in error or would no longer like to receive these types of communications, please e-mail [email or web address]

## 2017-12-13 NOTE — PROGRESS NOTES
Subjective:     Patient ID:  Nany Crenshaw is a 89 y.o. female.    Patient referred by Dr. Mcdaniel    Chief Complaint: Right hip pain    HPI  (Patient poor historian.  Encounter done today through a )    Nany Crenshaw is a 89 y.o. female who presents with the above CC.  Has had known scoliosis her whole life.  Left upper back pain that doesn't bother her too much at this time.  No low back pain.  Complains of a six month history of right hip pain when walking and putting pressure on her right foot.    No pain in this region with sitting.  Some pain when turning in bed.  No right leg pain.      Has osteoporosis and recent falls but the pain did not start after a fall.  She does not remember her falls.    Patient has not had PT or ESIs for the pain.  No spine surgery.  She has never been treated for hip pain in the past.  Denies any hip fractures or hip surgery.  Patient is currently not taking any medication for pain.    Patient denies any recent trauma, no saddle anesthesias, and no bowel or bladder incontinence.      Review of Systems:  Please refer to page three of the spine center intake form for a complete review of systems.    Past Medical History:   Diagnosis Date    Colon polyp     no further colon needed    Coronary artery disease     Diverticulosis     Fracture of right distal radius     GERD (gastroesophageal reflux disease)     Hypertension     Hypothyroidism (acquired)     Osteoporosis, post-menopausal     Scoliosis      Past Surgical History:   Procedure Laterality Date    ADENOIDECTOMY      CATARACT EXTRACTION      ou     SECTION      CHOLECYSTECTOMY      EYE SURGERY  2014    ptosis surgery      HYSTERECTOMY      yag cap      od     Current Outpatient Prescriptions on File Prior to Visit   Medication Sig Dispense Refill    apixaban 5 mg Tab Take 5 mg by mouth 2 (two) times daily.      bimatoprost (LUMIGAN) 0.03 % ophthalmic drops 1 drop every evening.       fluorouracil (EFUDEX) 5 % cream Apply to affected area on face twice daily x 2 weeks 40 g 1    levothyroxine (SYNTHROID) 50 MCG tablet TK 1 T PO QD  2    lisinopril 10 MG tablet Take 2 tablets (20 mg total) by mouth once daily. (Patient taking differently: Take 10 mg by mouth once daily. Taking 1 1/2 tablet dailly) 60 tablet 0    metoprolol tartrate (LOPRESSOR) 25 MG tablet TAKE 1 TABLET BY MOUTH TWICE DAILY 180 tablet 3    VYTORIN 10-40 10-40 mg per tablet TAKE 1 TABLET BY MOUTH EVERY EVENING 90 tablet 1    [DISCONTINUED] aspirin (ECOTRIN) 81 MG EC tablet Take 81 mg by mouth once daily.       No current facility-administered medications on file prior to visit.      Review of patient's allergies indicates:   Allergen Reactions    Bactrim [sulfamethoxazole-trimethoprim] Nausea And Vomiting     Social History     Social History    Marital status:      Spouse name: N/A    Number of children: 3    Years of education: 12     Occupational History    retired cook/      Social History Main Topics    Smoking status: Never Smoker    Smokeless tobacco: Not on file    Alcohol use 3.6 oz/week     6 Glasses of wine per week      Comment: occasional /social    Drug use: No    Sexual activity: Yes     Partners: Male     Other Topics Concern    Not on file     Social History Narrative    Adult Screenings updated and reviewed   6/11/14    Mammogram( for females) 4/25/12    Pap ( for females) history of hysterectomy    Colonoscopy age  50    Flu shot 10/14/2013    Td ?  2005    Pneumovax     Zostavax recommended one time at  age  60- not done yet     Eye exam recommended yearly         Family History   Problem Relation Age of Onset    Cancer Mother      skin cancer     Dementia Mother     Hypertension Mother     Heart disease Brother     Cancer Brother      lung cancer     No Known Problems Father     No Known Problems Sister     No Known Problems Maternal Aunt     No Known Problems  "Maternal Uncle     No Known Problems Paternal Aunt     No Known Problems Paternal Uncle     No Known Problems Maternal Grandmother     No Known Problems Maternal Grandfather     No Known Problems Paternal Grandmother     No Known Problems Paternal Grandfather     Amblyopia Neg Hx     Blindness Neg Hx     Cataracts Neg Hx     Diabetes Neg Hx     Glaucoma Neg Hx     Macular degeneration Neg Hx     Retinal detachment Neg Hx     Strabismus Neg Hx     Stroke Neg Hx     Thyroid disease Neg Hx     Melanoma Neg Hx        Objective:      Vitals:    12/13/17 1040   BP: (!) 153/68   Pulse: (!) 55   Weight: 53.1 kg (117 lb)   Height: 4' 10" (1.473 m)   PainSc:   8   PainLoc: Hip         Physical Exam:    General:  Nany Crenshaw is well-developed, well-nourished, appears stated age, in no acute distress, alert and oriented to person, place, and time.    Pulmonary/Chest:  Respiratory effort normal  Abdominal: Exhibits no distension  Psychiatric:  Normal mood and affect.  Behavior is normal.  Judgement and thought content normal    Musculoskeletal:    Patient arises from a sitting to standing position without difficulty.  Patient walks to the door with pain in her right hip.    Lumbar ROM:   No pain in the back in lumbar flexion, extension, right lateral bending, and left lateral bending.    Lumbar Spine Inspection:  Normal with no surgical scars and no visible rashes.    Lumbar Spine Palpation:  No tenderness to low back palpation.    SI Joint Palpation:  No tenderness to SI Joint palpation.    Straight Leg Raise:  Negative right and left SLR.    Pain over there right greater trochanter on palpation.    Pain in the right hip on flexion, extension, and adduction.  No pain on internal and external rotation and abduction of the right hip.    Neurological: Alert and oriented to person, place, and time    Muscle strength against resistance:     Right Left   Hip flexion  5 / 5 5 / 5   Hip extension 5 / 5 5 / 5   Hip " abduction 5 / 5 5 / 5   Hip adduction  5 / 5 5 / 5   Knee extension  5 / 5 5 / 5   Knee flexion 5 / 5 5 / 5   Dorsiflexion  5 / 5 5 / 5   EHL  5 / 5 5 / 5   Plantar flexion  5 / 5 5 / 5   Inversion of the feet 5 / 5 5 / 5   Eversion of the feet  5 / 5 5 / 5     Reflexes:     Right Left   Patellar 2+ 2+   Achilles 2+ 2+     Clonus:  Negative bilaterally    On gross examination of the bilateral upper extremities, patient has full painfree ROM with no signs of clubbing, cyanosis, edema, or weakness.     XRAY Results:    Narrative     AP, lateral neutral, lateral flexion and lateral extension radiographs were obtained.    There is significant thoracolumbar scoliosis convex to the left in the mid to lower lumbar region.  Advanced osseous demineralization is noted.  There is no evidence of compression deformity.  There is facet degenerative change.  No definite change in alignment with flexion or extension.  Alignment at the L5-S1 level is difficult to assess however the other levels appear satisfactory. Ectatic abdominal aorta noted.   Impression      As above      Electronically signed by: Keisha Mari MD  Date: 12/13/17  Time: 13:02          Assessment:          1. Right hip pain    2. Spondylosis without myelopathy    3. DDD (degenerative disc disease), lumbosacral    4. Acquired scoliosis            Plan:          Orders Placed This Encounter    Ambulatory referral to Orthopedics       Right hip pain/probable right greater trochanteric bursitis    -Will refer to ortho for further right hip/greater trochanter bursitis    Follow-Up:  Return if symptoms worsen or fail to improve. If there are any questions prior to this, the patient was instructed to contact the office.       DENISSE Van, PA-C  Neurosurgery  [facility]  [facility]

## 2017-12-14 ENCOUNTER — OFFICE VISIT (OUTPATIENT)
Dept: ORTHOPEDICS | Facility: CLINIC | Age: 82
End: 2017-12-14
Payer: MEDICARE

## 2017-12-14 ENCOUNTER — HOSPITAL ENCOUNTER (OUTPATIENT)
Dept: RADIOLOGY | Facility: HOSPITAL | Age: 82
Discharge: HOME | End: 2017-12-14
Attending: NURSE PRACTITIONER
Payer: MEDICARE

## 2017-12-14 VITALS — HEIGHT: 58 IN | WEIGHT: 119.25 LBS | BODY MASS INDEX: 25.03 KG/M2

## 2017-12-14 DIAGNOSIS — M70.61 TROCHANTERIC BURSITIS OF RIGHT HIP: Primary | ICD-10-CM

## 2017-12-14 DIAGNOSIS — M25.552 BILATERAL HIP PAIN: ICD-10-CM

## 2017-12-14 DIAGNOSIS — M25.552 BILATERAL HIP PAIN: Primary | ICD-10-CM

## 2017-12-14 DIAGNOSIS — M25.551 BILATERAL HIP PAIN: Primary | ICD-10-CM

## 2017-12-14 DIAGNOSIS — M25.551 BILATERAL HIP PAIN: ICD-10-CM

## 2017-12-14 PROCEDURE — 99203 OFFICE O/P NEW LOW 30 MIN: CPT | Mod: S$GLB,,, | Performed by: NURSE PRACTITIONER

## 2017-12-14 PROCEDURE — 99999 PR PBB SHADOW E&M-EST. PATIENT-LVL III: CPT | Mod: PBBFAC,,, | Performed by: NURSE PRACTITIONER

## 2017-12-14 PROCEDURE — 73521 X-RAY EXAM HIPS BI 2 VIEWS: CPT | Mod: 26,,, | Performed by: RADIOLOGY

## 2017-12-14 PROCEDURE — 73521 X-RAY EXAM HIPS BI 2 VIEWS: CPT | Mod: TC

## 2017-12-14 RX ORDER — DICLOFENAC SODIUM 10 MG/G
2 GEL TOPICAL 4 TIMES DAILY
Qty: 1 TUBE | Refills: 2 | Status: SHIPPED | OUTPATIENT
Start: 2017-12-14 | End: 2021-04-04 | Stop reason: CLARIF

## 2017-12-14 NOTE — LETTER
December 17, 2017      Emili Tamez PA-C  [address]  [facility] [address]           [facility]  [address]  [facility] [address]  Phone: [phone]  Fax: [phone]          Patient: Nany Crenshaw   MR Number: [id number]   YOB: 1927   Date of Visit: 12/14/2017       Dear Emili Tamez:    Thank you for referring Nany Crenshaw to me for evaluation. Attached you will find relevant portions of my assessment and plan of care.    If you have questions, please do not hesitate to call me. I look forward to following Nany Crenshaw along with you.    Sincerely,    Sonia Alvarez, NP    Enclosure  CC:  No Recipients    If you would like to receive this communication electronically, please contact [email or web address] or [phone] to request more information on [facility] access.    For providers and/or their staff who would like to refer a patient to [facility], please contact us through our one-stop-shop provider referral line, [facility] , at [phone].    If you feel you have received this communication in error or would no longer like to receive these types of communications, please e-mail [email or web address]

## 2017-12-17 NOTE — PROGRESS NOTES
CC: Pain of the Right Hip      HPI: Pt with right hip pain which is lateral and aching for the past several years. She has problems with scoliosis and has a self reported leg length discrepancy which she feels makes the pain worse with activity. She has taken tylenol and tried otc creams without relief. She comes to this appointment in a wheelchair with her daughter. She uses a cane to ambulate at home.    ROS  General: denies fever and chills  Resp: no c/o sob  CVS: no c/o cp  MSK: c/o right hip pain which is aching and located to the lateral hip. The pain is aching and worse with activity    PE  General: AAOx3, pleasant and cooperative  Resp: respirations even and unlabored  MSK: right hip exam  - stinchfield  - pain with internal rotation  - pain with external rotation  + pain over the greater trochanter    Xray:  Reviewed by me: Both hip joint spaces are mildly narrowed consistent with degenerative joint disease. No erosion or significant spurring is identified. Minor degenerative change is present at the SI joints. Partially seen lumbar spine shows degenerative change and levoscoliosis    Assessment:  Right hip trochanteric bursitis    Plan:  Discussed treatment options with the patient and her daughter. She would like to try voltaren gel for the next week. If no relief she will f/u with me for a cortisone injection. Her daughter was given my contact information. I will call her back with information regarding getting lifts for her shoes to help with her leg length discrepancy

## 2017-12-22 ENCOUNTER — OFFICE VISIT (OUTPATIENT)
Dept: ORTHOPEDICS | Facility: CLINIC | Age: 82
End: 2017-12-22
Payer: MEDICARE

## 2017-12-22 VITALS — BODY MASS INDEX: 24.99 KG/M2 | HEIGHT: 58 IN | WEIGHT: 119.06 LBS

## 2017-12-22 DIAGNOSIS — M70.61 TROCHANTERIC BURSITIS OF RIGHT HIP: Primary | ICD-10-CM

## 2017-12-22 PROCEDURE — 20610 DRAIN/INJ JOINT/BURSA W/O US: CPT | Mod: RT,S$GLB,, | Performed by: NURSE PRACTITIONER

## 2017-12-22 PROCEDURE — 99999 PR PBB SHADOW E&M-EST. PATIENT-LVL III: CPT | Mod: PBBFAC,,, | Performed by: NURSE PRACTITIONER

## 2017-12-22 PROCEDURE — 99499 UNLISTED E&M SERVICE: CPT | Mod: S$GLB,,, | Performed by: NURSE PRACTITIONER

## 2017-12-22 RX ORDER — APIXABAN 5 MG/1
TABLET, FILM COATED ORAL
Qty: 60 TABLET | Refills: 0 | Status: SHIPPED | OUTPATIENT
Start: 2017-12-22 | End: 2018-01-18 | Stop reason: SDUPTHER

## 2017-12-22 RX ORDER — TRIAMCINOLONE ACETONIDE 40 MG/ML
40 INJECTION, SUSPENSION INTRA-ARTICULAR; INTRAMUSCULAR
Status: COMPLETED | OUTPATIENT
Start: 2017-12-22 | End: 2017-12-22

## 2017-12-22 RX ADMIN — TRIAMCINOLONE ACETONIDE 40 MG: 40 INJECTION, SUSPENSION INTRA-ARTICULAR; INTRAMUSCULAR at 11:12

## 2017-12-22 NOTE — PROGRESS NOTES
Pt was seen last week in after hours clinic for right hip trochanteric bursitis. She wanted to try voltaren gel, but that hasn't helped so she returns today for a cortisone injection in the right hip.    Greater Trochanter Bursa Injection  Procedure Note    Pre-operative Diagnosis: right greater trochanteric bursitis    Post-operative Diagnosis: same    Indications: right hip pain    Anesthesia: none    Procedure Details     Verbal consent was obtained for the procedure. The injection site was identified and the skin was prepared with alcohol. The right hip was injected from a lateral approach with 1 ml of Kenalog and 3 ml Lidocaine under sterile technique using a 25 gauge 1 1/2 inch needle. The needle was removed and the area cleansed and dressed.    Complications:  None; patient tolerated the procedure well.    she was advised to rest the leg today, using ice and Tylenol as needed for comfort and swelling. she may have an increase in discomfort tonight followed by steady improvement over the next several days. It may take 1-3 weeks following the injection to get the full benefit of the medication.

## 2018-01-04 RX ORDER — LEVOTHYROXINE SODIUM 50 UG/1
TABLET ORAL
Qty: 90 TABLET | Refills: 3 | Status: SHIPPED | OUTPATIENT
Start: 2018-01-04 | End: 2019-06-11 | Stop reason: SDUPTHER

## 2018-01-28 RX ORDER — EZETIMIBE AND SIMVASTATIN 10; 40 MG/1; MG/1
1 TABLET ORAL NIGHTLY
Qty: 90 TABLET | Refills: 4 | Status: SHIPPED | OUTPATIENT
Start: 2018-01-28 | End: 2018-12-03 | Stop reason: SDUPTHER

## 2018-02-11 ENCOUNTER — PATIENT MESSAGE (OUTPATIENT)
Dept: INTERNAL MEDICINE | Facility: CLINIC | Age: 83
End: 2018-02-11

## 2018-02-14 ENCOUNTER — TELEPHONE (OUTPATIENT)
Dept: INTERNAL MEDICINE | Facility: CLINIC | Age: 83
End: 2018-02-14

## 2018-02-16 ENCOUNTER — TELEPHONE (OUTPATIENT)
Dept: INTERNAL MEDICINE | Facility: CLINIC | Age: 83
End: 2018-02-16

## 2018-02-16 ENCOUNTER — PATIENT MESSAGE (OUTPATIENT)
Dept: INTERNAL MEDICINE | Facility: CLINIC | Age: 83
End: 2018-02-16

## 2018-02-16 RX ORDER — SIMVASTATIN 40 MG/1
40 TABLET, FILM COATED ORAL NIGHTLY
Qty: 90 TABLET | Refills: 3 | Status: SHIPPED | OUTPATIENT
Start: 2018-02-16 | End: 2018-12-02 | Stop reason: SDUPTHER

## 2018-02-16 RX ORDER — EZETIMIBE 10 MG/1
10 TABLET ORAL DAILY
Qty: 90 TABLET | Refills: 3 | Status: SHIPPED | OUTPATIENT
Start: 2018-02-16 | End: 2018-12-02 | Stop reason: SDUPTHER

## 2018-02-16 NOTE — TELEPHONE ENCOUNTER
Please call and Notify pt or daughter  insurance will not cover her  vytorin  Will send in ezetimibe and simvastatin  Which is generic equivalent instead (two instead of one )  See if covered

## 2018-03-08 ENCOUNTER — PES CALL (OUTPATIENT)
Dept: ADMINISTRATIVE | Facility: CLINIC | Age: 83
End: 2018-03-08

## 2018-04-03 ENCOUNTER — OFFICE VISIT (OUTPATIENT)
Dept: PODIATRY | Facility: CLINIC | Age: 83
End: 2018-04-03
Payer: MEDICARE

## 2018-04-03 VITALS — BODY MASS INDEX: 24.98 KG/M2 | WEIGHT: 119 LBS | HEIGHT: 58 IN

## 2018-04-03 DIAGNOSIS — M20.10 VALGUS DEFORMITY OF GREAT TOE, UNSPECIFIED LATERALITY: ICD-10-CM

## 2018-04-03 DIAGNOSIS — B35.1 ONYCHOMYCOSIS DUE TO DERMATOPHYTE: ICD-10-CM

## 2018-04-03 DIAGNOSIS — I73.9 PERIPHERAL VASCULAR DISEASE, UNSPECIFIED: Primary | ICD-10-CM

## 2018-04-03 DIAGNOSIS — M20.42 HAMMER TOES OF BOTH FEET: ICD-10-CM

## 2018-04-03 DIAGNOSIS — L60.2 ONYCHAUXIS: ICD-10-CM

## 2018-04-03 DIAGNOSIS — L84 CORN OR CALLUS: ICD-10-CM

## 2018-04-03 DIAGNOSIS — M20.41 HAMMER TOES OF BOTH FEET: ICD-10-CM

## 2018-04-03 PROCEDURE — 11057 PARNG/CUTG B9 HYPRKR LES >4: CPT | Mod: Q9,S$GLB,,

## 2018-04-03 PROCEDURE — 11721 DEBRIDE NAIL 6 OR MORE: CPT | Mod: 59,Q9,S$GLB,

## 2018-04-03 PROCEDURE — 99203 OFFICE O/P NEW LOW 30 MIN: CPT | Mod: 25,S$GLB,,

## 2018-04-03 RX ORDER — LISINOPRIL 10 MG/1
TABLET ORAL
Qty: 135 TABLET | Refills: 3 | Status: SHIPPED | OUTPATIENT
Start: 2018-04-03 | End: 2019-03-09 | Stop reason: SDUPTHER

## 2018-04-03 RX ORDER — LISINOPRIL 10 MG/1
TABLET ORAL
Qty: 135 TABLET | Refills: 0 | OUTPATIENT
Start: 2018-04-03

## 2018-04-03 NOTE — PROGRESS NOTES
Subjective:       Patient ID: Nany Crenshaw is a 89 y.o. female.    Chief Complaint: Nail Care and Callouses    HPI  Nany is a 89 y.o. female who presents to the clinic for evaluation and treatment of high risk feet. Nany has a past medical history of Colon polyp; Coronary artery disease; Diverticulosis; Fracture of right distal radius; GERD (gastroesophageal reflux disease); Hypertension; Hypothyroidism (acquired); Osteoporosis, post-menopausal; and Scoliosis. The patient's chief complaint is long, thick toenails. This patient has documented high risk feet requiring routine maintenance secondary to peripheral vascular disease.    PCP: Ivette Mcdaniel MD    Date Last Seen by PCP: 8/11/17  Cards Dr. Noe 11/2/17     Current shoe gear:  Affected Foot: Casual shoes     Unaffected Foot: Casual shoes    Last encounter in this department: Visit date not found    Hemoglobin A1C   Date Value Ref Range Status   01/20/2005 5.6 4.5 - 6.2 % Final     Review of Systems  ROS:  Constitution: Negative for chills, fever, weakness and malaise/fatigue.   HEENT: Negative for headaches.   Cardiovascular: Negative for chest pain and claudication.   Respiratory: Negative for cough and shortness of breath.   Musculoskeletal: Positive for foot pain.  Negative for muscle cramps and muscle weakness.   Gastrointestinal: Negative for nausea and vomiting.   Neurological: Negative for numbness and paresthesias.   Dermatological: Negative for wound.        Objective:      Physical Exam  Constitutional:   Patient is oriented to person, place, and time. Vital signs are normal. Appears well-developed and well-nourished.     Vascular:   Dorsalis pedis pulses are 1+ on the right side, and 1+ on the left side.   Posterior tibial pulses are 1+ on the right side, and 1+ on the left side.   - digital hair growth, capillary fill time to all toes =4 seconds, toes are cool to touch  + swelling feet and ankles    Skin/Dermatological:   Skin is thin,  warm, shiny and atrophic. No cyanosis or clubbing. No rashes noted. No open wounds.   Right 1, 2, 3, 4, 5 left 1, 2, 3, 4, 5 toenails yellow discolored, thickened 3 mm, elongated 3 mm with subungual debris and tenderness.  Corns distal third toes. Lateral 5th toes. Medial great toes    Musculoskeletal:   Mild bunions, hammertoes and bunionettes observed.  Decreased range of motion of bilateral midtarsal, subtalar joints, ankle joint dorsiflexion is restricted bilaterally. Muscle strength to tibialis anterior, extensor hallucis longus, extensor digitorum longus, peroneal muscles, flexor hallucis/digotorum longus, posterior tibial and gastrosoleal complex is 5/5.    Neurological:   Negative deficits to sharp/dull, light touch or vibratory sensation bilateral feet           Assessment:       1. Peripheral vascular disease, unspecified    2. Onychomycosis due to dermatophyte    3. Onychauxis    4. Valgus deformity of great toe, unspecified laterality    5. Hammer toes of both feet    6. Corn or callus        Plan:       Peripheral vascular disease, unspecified    Onychomycosis due to dermatophyte    Onychauxis    Valgus deformity of great toe, unspecified laterality    Hammer toes of both feet    Corn or callus          Shoe inspection. Foot Education. Patient instructed on proper foot hygeine. We discussed wearing proper shoe gear, daily foot inspections, never walking without protective shoe gear, never putting sharp instruments to feet.  We also discussed padding and shoes with high toe boxes for  foot deformities.    - With patient's permission, right 1, 2, 3, 4, 5 and left 1, 2, 3, 4, 5 toenails were aggressively reduced and debrided  to their soft tissue attachment mechanically with nail nipper, removing all offending nail and debris. The porokeratotic tissue was pared x 6 with a 15 blade.  Patient relates relief following the procedure. Patient will continue to monitor the areas daily, inspect her feet, wear  protective shoe gear when ambulatory, moisturizer to maintain skin integrity and follow in this office in approximately 3 months, sooner p.r.n.

## 2018-05-10 ENCOUNTER — OFFICE VISIT (OUTPATIENT)
Dept: CARDIOLOGY | Facility: CLINIC | Age: 83
End: 2018-05-10
Payer: MEDICARE

## 2018-05-10 VITALS
HEART RATE: 72 BPM | RESPIRATION RATE: 16 BRPM | DIASTOLIC BLOOD PRESSURE: 78 MMHG | BODY MASS INDEX: 23.96 KG/M2 | WEIGHT: 114.63 LBS | OXYGEN SATURATION: 93 % | SYSTOLIC BLOOD PRESSURE: 142 MMHG

## 2018-05-10 DIAGNOSIS — I25.10 CORONARY ARTERY DISEASE DUE TO LIPID RICH PLAQUE: Primary | ICD-10-CM

## 2018-05-10 DIAGNOSIS — E78.00 PURE HYPERCHOLESTEROLEMIA: ICD-10-CM

## 2018-05-10 DIAGNOSIS — I10 BENIGN HYPERTENSION: ICD-10-CM

## 2018-05-10 DIAGNOSIS — I82.591 CHRONIC EMBOLISM AND THROMBOSIS OF OTHER SPECIFIED DEEP VEIN OF RIGHT LOWER EXTREMITY: ICD-10-CM

## 2018-05-10 DIAGNOSIS — I73.9 PERIPHERAL VASCULAR DISEASE, UNSPECIFIED: ICD-10-CM

## 2018-05-10 DIAGNOSIS — E03.9 HYPOTHYROIDISM (ACQUIRED): ICD-10-CM

## 2018-05-10 DIAGNOSIS — I25.83 CORONARY ARTERY DISEASE DUE TO LIPID RICH PLAQUE: Primary | ICD-10-CM

## 2018-05-10 DIAGNOSIS — I82.401 ACUTE DEEP VEIN THROMBOSIS (DVT) OF RIGHT LOWER EXTREMITY, UNSPECIFIED VEIN: ICD-10-CM

## 2018-05-10 PROCEDURE — 99999 PR PBB SHADOW E&M-EST. PATIENT-LVL III: CPT | Mod: PBBFAC,,, | Performed by: INTERNAL MEDICINE

## 2018-05-10 PROCEDURE — 99214 OFFICE O/P EST MOD 30 MIN: CPT | Mod: S$GLB,,, | Performed by: INTERNAL MEDICINE

## 2018-05-10 PROCEDURE — 99499 UNLISTED E&M SERVICE: CPT | Mod: S$GLB,,, | Performed by: INTERNAL MEDICINE

## 2018-05-10 NOTE — PROGRESS NOTES
Subjective:    Patient ID:  Nany Crenshaw is a 89 y.o. female who presents for follow-up of No chief complaint on file.      Coronary Artery Disease   Symptoms include muscle weakness.    previous history:  Here for follow-up of DVT and coronary artery disease.  She's had some inflammation and prominence of her varicosities in the right leg.  She's mainly worried about recurrence of clot of blood thinners.  She denies any worsening cardiopulmonary complaints.  She's not had any chest pain, shortness of breath or palpitations.  She denies any PND, orthopnea or lower edema.  She is not expressing dizziness, presyncope or syncope.    Today:  Here for follow-up of DVT and coronary artery disease.  She still has some prominence in varicosities in her legs.  She says at times it gets red around her right ankle but does not cause her pain.  She tolerated her blood thinner as had no problems.  She recently went to [facility] without any issues on the airplane.  Do a trip to [place] planned as well.  She denies any PND, orthopnea or lower extremity edema otherwise.  She's not expressing dizziness, presyncope or syncope.      Review of Systems   Constitution: Negative.   HENT: Negative.    Eyes: Negative.    Cardiovascular: Negative for dyspnea on exertion, irregular heartbeat, near-syncope, orthopnea, paroxysmal nocturnal dyspnea and syncope.   Skin: Negative.    Musculoskeletal: Positive for joint pain and muscle weakness.   Gastrointestinal: Negative for abdominal pain, constipation and diarrhea.   Genitourinary: Negative for dysuria.   Neurological: Negative.    Psychiatric/Behavioral: Negative.         Objective:    Physical Exam   Constitutional: She is oriented to person, place, and time. She appears well-developed and well-nourished.   HENT:   Head: Normocephalic and atraumatic.   Eyes: Conjunctivae and EOM are normal. Pupils are equal, round, and reactive to light.   Neck: Normal range of motion. Neck supple. No  thyromegaly present.   Cardiovascular: Normal rate and regular rhythm.    No murmur heard.  Pulmonary/Chest: Effort normal and breath sounds normal. No respiratory distress.   Abdominal: Soft. Bowel sounds are normal.   Musculoskeletal: Normal range of motion. She exhibits no edema.   Prominent varicosities with some inflammation right greater than left   Neurological: She is alert and oriented to person, place, and time.   Skin: Skin is warm and dry.   Psychiatric: She has a normal mood and affect. Her behavior is normal.       lower extremity ultrasound:   11-17  Nonocclusive thrombus in the right femoral vein and right popliteal vein.    Echo:  CONCLUSIONS     1 - Normal left ventricular systolic function (EF 55-60%).     2 - Mild aortic regurgitation.     LDL-98  9-17    Assessment:       1. Coronary artery disease due to lipid rich plaque    2. Benign hypertension    3. Hypothyroidism (acquired)    4. Pure hypercholesterolemia    5. 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 Acute deep vein thrombosis (DVT) of right lower extremity, unspecified vein    7. Peripheral vascular disease, unspecified         Plan:       -cont med tx  -cont to follow sx mainly  -Diet and exercise tolerated  -On Eliquis for OAC indefinitely for recurrent DVT  -Check labs  -Check repeat lower extremity venous with recurrent pain for possible treatment failure, check arterial as well  -Carotid screen    RTC 6 months

## 2018-05-30 ENCOUNTER — HOSPITAL ENCOUNTER (OUTPATIENT)
Dept: CARDIOLOGY | Facility: HOSPITAL | Age: 83
Discharge: HOME | End: 2018-05-30
Attending: INTERNAL MEDICINE
Payer: MEDICARE

## 2018-05-30 DIAGNOSIS — I25.83 CORONARY ARTERY DISEASE DUE TO LIPID RICH PLAQUE: ICD-10-CM

## 2018-05-30 DIAGNOSIS — I82.591 CHRONIC EMBOLISM AND THROMBOSIS OF OTHER SPECIFIED DEEP VEIN OF RIGHT LOWER EXTREMITY: ICD-10-CM

## 2018-05-30 DIAGNOSIS — I25.10 CORONARY ARTERY DISEASE DUE TO LIPID RICH PLAQUE: ICD-10-CM

## 2018-05-30 DIAGNOSIS — I82.401 ACUTE DEEP VEIN THROMBOSIS (DVT) OF RIGHT LOWER EXTREMITY, UNSPECIFIED VEIN: ICD-10-CM

## 2018-05-30 LAB — INTERNAL CAROTID STENOSIS: NORMAL

## 2018-05-30 PROCEDURE — 93970 EXTREMITY STUDY: CPT

## 2018-05-30 PROCEDURE — 93880 EXTRACRANIAL BILAT STUDY: CPT | Mod: 26,,, | Performed by: INTERNAL MEDICINE

## 2018-05-30 PROCEDURE — 93970 EXTREMITY STUDY: CPT | Mod: 26,,, | Performed by: INTERNAL MEDICINE

## 2018-05-30 PROCEDURE — 93925 LOWER EXTREMITY STUDY: CPT | Mod: 26,,, | Performed by: INTERNAL MEDICINE

## 2018-05-30 PROCEDURE — 93880 EXTRACRANIAL BILAT STUDY: CPT

## 2018-05-30 PROCEDURE — 93925 LOWER EXTREMITY STUDY: CPT

## 2018-07-10 ENCOUNTER — TELEPHONE (OUTPATIENT)
Dept: PODIATRY | Facility: CLINIC | Age: 83
End: 2018-07-10

## 2018-07-10 NOTE — TELEPHONE ENCOUNTER
called pt and informed that her appointment have been cancel the doctor will not be in clinic today she stated that she will have to call her daughter.

## 2018-07-18 ENCOUNTER — OFFICE VISIT (OUTPATIENT)
Dept: PODIATRY | Facility: CLINIC | Age: 83
End: 2018-07-18
Payer: MEDICARE

## 2018-07-18 VITALS — DIASTOLIC BLOOD PRESSURE: 63 MMHG | HEIGHT: 58 IN | SYSTOLIC BLOOD PRESSURE: 133 MMHG | HEART RATE: 57 BPM

## 2018-07-18 DIAGNOSIS — I73.9 PERIPHERAL VASCULAR DISEASE, UNSPECIFIED: Primary | ICD-10-CM

## 2018-07-18 DIAGNOSIS — M20.42 HAMMER TOES OF BOTH FEET: ICD-10-CM

## 2018-07-18 DIAGNOSIS — B35.1 ONYCHOMYCOSIS DUE TO DERMATOPHYTE: ICD-10-CM

## 2018-07-18 DIAGNOSIS — M20.10 VALGUS DEFORMITY OF GREAT TOE, UNSPECIFIED LATERALITY: ICD-10-CM

## 2018-07-18 DIAGNOSIS — M20.41 HAMMER TOES OF BOTH FEET: ICD-10-CM

## 2018-07-18 DIAGNOSIS — L84 CORN OR CALLUS: ICD-10-CM

## 2018-07-18 PROCEDURE — 11721 DEBRIDE NAIL 6 OR MORE: CPT | Mod: 59,Q9,S$GLB,

## 2018-07-18 PROCEDURE — 11057 PARNG/CUTG B9 HYPRKR LES >4: CPT | Mod: Q9,S$GLB,,

## 2018-07-18 PROCEDURE — 99999 PR PBB SHADOW E&M-EST. PATIENT-LVL III: CPT | Mod: PBBFAC,,,

## 2018-07-18 PROCEDURE — 99213 OFFICE O/P EST LOW 20 MIN: CPT | Mod: 25,S$GLB,,

## 2018-07-18 RX ORDER — METOPROLOL TARTRATE 25 MG/1
TABLET, FILM COATED ORAL
Qty: 180 TABLET | Refills: 3 | Status: SHIPPED | OUTPATIENT
Start: 2018-07-18 | End: 2019-07-15 | Stop reason: SDUPTHER

## 2018-07-18 NOTE — PROGRESS NOTES
Subjective:       Patient ID: Nany Crenshaw is a 89 y.o. female.    Chief Complaint: Nail Care (PCP MARVIN Quezada 11/17/17); Nail Problem; and Peripheral Vascular Disease    HPI  Nany is a 89 y.o. female who presents to the clinic for evaluation and treatment of high risk feet. Nany has a past medical history of Colon polyp; Coronary artery disease; Diverticulosis; Fracture of right distal radius; GERD (gastroesophageal reflux disease); Hypertension; Hypothyroidism (acquired); Osteoporosis, post-menopausal; and Scoliosis. The patient's chief complaint is long, thick toenails. This patient has documented high risk feet requiring routine maintenance secondary to peripheral vascular disease.    PCP: Ivette Mcdaniel MD    Date Last Seen by PCP: 11/17/17 due to see 7/25/18  Cards Dr. Noe 11/2/17     Current shoe gear:  Affected Foot: Casual shoes     Unaffected Foot: Casual shoes    Last encounter in this department: Visit date not found    Hemoglobin A1C   Date Value Ref Range Status   01/20/2005 5.6 4.5 - 6.2 % Final     Review of Systems  ROS:  Constitution: Negative for chills, fever, weakness and malaise/fatigue.   HEENT: Negative for headaches.   Cardiovascular: Negative for chest pain and claudication.   Respiratory: Negative for cough and shortness of breath.   Musculoskeletal: Positive for foot pain.  Negative for muscle cramps and muscle weakness.   Gastrointestinal: Negative for nausea and vomiting.   Neurological: Negative for numbness and paresthesias.   Dermatological: Negative for wound.        Objective:      Physical Exam  Constitutional:   Patient is oriented to person, place, and time. Vital signs are normal. Appears well-developed and well-nourished.     Vascular:   Dorsalis pedis pulses are 1+ on the right side, and 1+ on the left side.   Posterior tibial pulses are 1+ on the right side, and 1+ on the left side.   - digital hair growth, capillary fill time to all toes =4 seconds, toes  are cool to touch  + swelling feet and ankles    Skin/Dermatological:   Skin is thin, warm, shiny and atrophic. No cyanosis or clubbing. No rashes noted. No open wounds.   Right 1, 2, 3, 4, 5 left 1, 2, 3, 4, 5 toenails yellow discolored, thickened 3 mm, elongated 3 mm with subungual debris and tenderness.  Corns distal third toes. Lateral 5th toes. Medial great toes    Musculoskeletal:   Mild bunions, hammertoes and bunionettes observed.  Decreased range of motion of bilateral midtarsal, subtalar joints, ankle joint dorsiflexion is restricted bilaterally. Muscle strength to tibialis anterior, extensor hallucis longus, extensor digitorum longus, peroneal muscles, flexor hallucis/digotorum longus, posterior tibial and gastrosoleal complex is 5/5.    Neurological:   Negative deficits to sharp/dull, light touch or vibratory sensation bilateral feet           Assessment:       1. Peripheral vascular disease, unspecified    2. Onychomycosis due to dermatophyte    3. Valgus deformity of great toe, unspecified laterality    4. Hammer toes of both feet    5. Corn or callus        Plan:       Peripheral vascular disease, unspecified    Onychomycosis due to dermatophyte    Valgus deformity of great toe, unspecified laterality    Hammer toes of both feet    Corn or callus          Shoe inspection. Foot Education. Patient instructed on proper foot hygeine. We discussed wearing proper shoe gear, daily foot inspections, never walking without protective shoe gear, never putting sharp instruments to feet.  We also discussed padding and shoes with high toe boxes for  foot deformities.    - With patient's permission, right 1, 2, 3, 4, 5 and left 1, 2, 3, 4, 5 toenails were aggressively reduced and debrided  to their soft tissue attachment mechanically with nail nipper, removing all offending nail and debris. The porokeratotic tissue was pared x 6 with a 15 blade.  Patient relates relief following the procedure. Patient will continue  to monitor the areas daily, inspect her feet, wear protective shoe gear when ambulatory, moisturizer to maintain skin integrity and follow in this office in approximately 3 months, sooner p.r.n.

## 2018-07-20 ENCOUNTER — TELEPHONE (OUTPATIENT)
Dept: INTERNAL MEDICINE | Facility: CLINIC | Age: 83
End: 2018-07-20

## 2018-07-20 NOTE — TELEPHONE ENCOUNTER
----- Message from Tyra Miller sent at 7/18/2018  9:39 AM CDT -----  Contact: Marisol, pts daughter  Marisol is calling to schedule pts annual check up but would like to come sooner than the first available. She would also like a 330 appt.      Marisol can be reached at [phone]

## 2018-11-20 ENCOUNTER — OFFICE VISIT (OUTPATIENT)
Dept: PODIATRY | Facility: CLINIC | Age: 83
End: 2018-11-20
Payer: MEDICARE

## 2018-11-20 ENCOUNTER — IMMUNIZATION (OUTPATIENT)
Dept: FAMILY MEDICINE | Facility: CLINIC | Age: 83
End: 2018-11-20
Payer: MEDICARE

## 2018-11-20 VITALS
BODY MASS INDEX: 23.93 KG/M2 | HEIGHT: 58 IN | DIASTOLIC BLOOD PRESSURE: 70 MMHG | SYSTOLIC BLOOD PRESSURE: 110 MMHG | WEIGHT: 114 LBS

## 2018-11-20 DIAGNOSIS — I73.9 PERIPHERAL VASCULAR DISEASE, UNSPECIFIED: Primary | ICD-10-CM

## 2018-11-20 DIAGNOSIS — B35.1 ONYCHOMYCOSIS DUE TO DERMATOPHYTE: ICD-10-CM

## 2018-11-20 DIAGNOSIS — L84 CORN OR CALLUS: ICD-10-CM

## 2018-11-20 DIAGNOSIS — Z23 NEED FOR PROPHYLACTIC VACCINATION AND INOCULATION AGAINST INFLUENZA: Primary | ICD-10-CM

## 2018-11-20 PROCEDURE — 11056 PARNG/CUTG B9 HYPRKR LES 2-4: CPT | Mod: Q9,HCNC,S$GLB, | Performed by: PODIATRIST

## 2018-11-20 PROCEDURE — 99999 PR PBB SHADOW E&M-EST. PATIENT-LVL I: CPT | Mod: PBBFAC,HCNC,,

## 2018-11-20 PROCEDURE — 99499 UNLISTED E&M SERVICE: CPT | Mod: HCNC,S$GLB,, | Performed by: PODIATRIST

## 2018-11-20 PROCEDURE — 99999 PR PBB SHADOW E&M-EST. PATIENT-LVL III: CPT | Mod: PBBFAC,HCNC,, | Performed by: PODIATRIST

## 2018-11-20 PROCEDURE — G0008 ADMIN INFLUENZA VIRUS VAC: HCPCS | Mod: HCNC,S$GLB,, | Performed by: INTERNAL MEDICINE

## 2018-11-20 PROCEDURE — 99499 UNLISTED E&M SERVICE: CPT | Mod: HCNC,S$GLB,, | Performed by: INTERNAL MEDICINE

## 2018-11-20 PROCEDURE — 11721 DEBRIDE NAIL 6 OR MORE: CPT | Mod: Q9,59,HCNC,S$GLB | Performed by: PODIATRIST

## 2018-11-20 PROCEDURE — 90662 IIV NO PRSV INCREASED AG IM: CPT | Mod: HCNC,S$GLB,, | Performed by: INTERNAL MEDICINE

## 2018-11-20 NOTE — PROGRESS NOTES
Subjective:      Patient ID: Nany Crenshaw is a 89 y.o. female.    Chief Complaint: Diabetes Mellitus (11/17/17 Jonas); Diabetic Foot Exam; and Nail Care    Nany is a 89 y.o. female who presents to the clinic for evaluation and treatment of high risk feet. Nany has a past medical history of Colon polyp, Coronary artery disease, Diverticulosis, Fracture of right distal radius, GERD (gastroesophageal reflux disease), Hypertension, Hypothyroidism (acquired), Osteoporosis, post-menopausal, and Scoliosis. The patient's chief complaint is long, thick toenails. This patient has documented high risk feet requiring routine maintenance secondary to peripheral vascular disease.    PCP: Ivette Mcdaniel MD    Date Last Seen by PCP: none found    Current shoe gear:  Affected Foot: Tennis shoes     Unaffected Foot: Tennis shoes    Last encounter in this department: Visit date not found    Hemoglobin A1C   Date Value Ref Range Status   01/20/2005 5.6 4.5 - 6.2 % Final       Review of Systems   Constitution: Negative for chills, diaphoresis, fever and weakness.   Cardiovascular: Negative for claudication, cyanosis, leg swelling and syncope.   Respiratory: Negative for cough and shortness of breath.    Skin: Positive for color change, nail changes and suspicious lesions.   Musculoskeletal: Negative for falls, joint pain, muscle cramps and muscle weakness.   Gastrointestinal: Negative for diarrhea, nausea and vomiting.   Neurological: Negative for disturbances in coordination, numbness, paresthesias, sensory change and tremors.   Psychiatric/Behavioral: Negative for altered mental status.           Objective:      Physical Exam   Constitutional: She appears well-developed.   Cardiovascular:   Dorsalis pedis and posterior tibial pulses are diminished Bilaterally. Toes are cool to touch. Feet are warm proximally.There is decreased digital hair. Skin is atrophic, slightly hyperpigmented, and mildly edematous    Pulmonary/Chest:  No respiratory distress.   Musculoskeletal: She exhibits tenderness. She exhibits no edema.   Adequate joint range of motion without pain, limitation, nor crepitation Bilateral feet and ankle joints. Muscle strength is 5/5 in all groups bilaterally.    Feet:   Right Foot:   Skin Integrity: Positive for callus and dry skin. Negative for ulcer or skin breakdown.   Left Foot:   Skin Integrity: Positive for dry skin. Negative for ulcer.   Neurological: She is alert.   [employer]Lela 5.07 monofilamant testing is diminished Jerald feet. Sharp/dull sensation diminished Bilaterally. Light touch absent Bilaterally.    Skin: Skin is dry. No erythema.   Nails x10 are elongated by 2-6mm's, thickened by 3-5 mm's, dystrophic, and are darkened in coloration . Xerosis Bilaterally.     Focal hyperkeratotic lesion consisting entirely of hyperkeratotic tissue without open skin, drainage, pus, fluctuance, malodor, or signs of infection: right submet head 2 and left submet 5.        Psychiatric: She has a normal mood and affect.             Assessment:       Encounter Diagnoses   Name Primary?    Peripheral vascular disease, unspecified Yes    Onychomycosis due to dermatophyte     Corn or callus          Plan:       Nany was seen today for diabetes mellitus, diabetic foot exam and nail care.    Diagnoses and all orders for this visit:    Peripheral vascular disease, unspecified    Onychomycosis due to dermatophyte    Corn or callus      I counseled the patient on her conditions, their implications and medical management.    Shoe inspection. Foot Education. Patient instructed on proper foot hygeine. We discussed wearing proper shoe gear, daily foot inspections, never walking without protective shoe gear, never putting sharp instruments to feet.  We also discussed padding and shoes with high toe boxes for  foot deformities.     - With patient's permission, right 1, 2, 3, 4, 5 and left 1, 2, 3, 4, 5 toenails were aggressively reduced  and debrided  to their soft tissue attachment mechanically with nail nipper, removing all offending nail and debris. The porokeratotic tissue was pared x 2 with a 15 blade right submet head 2 and left submet 5 .  Patient relates relief following the procedure. Patient will continue to monitor the areas daily, inspect her feet, wear protective shoe gear when ambulatory, moisturizer to maintain skin integrity and follow in this office in approximately 3 months, sooner p.r.n.    F/u 3 months    Marisol Newman DPM

## 2018-12-03 RX ORDER — EZETIMIBE 10 MG/1
TABLET ORAL
Qty: 90 TABLET | Refills: 0 | Status: SHIPPED | OUTPATIENT
Start: 2018-12-03 | End: 2019-06-02 | Stop reason: SDUPTHER

## 2018-12-03 RX ORDER — SIMVASTATIN 40 MG/1
TABLET, FILM COATED ORAL
Qty: 90 TABLET | Refills: 0 | Status: SHIPPED | OUTPATIENT
Start: 2018-12-03 | End: 2019-06-02 | Stop reason: SDUPTHER

## 2019-01-04 RX ORDER — LEVOTHYROXINE SODIUM 50 UG/1
TABLET ORAL
Qty: 90 TABLET | Refills: 0 | Status: SHIPPED | OUTPATIENT
Start: 2019-01-04 | End: 2019-01-24 | Stop reason: SDUPTHER

## 2019-01-17 ENCOUNTER — OFFICE VISIT (OUTPATIENT)
Dept: OPTOMETRY | Facility: CLINIC | Age: 84
End: 2019-01-17
Payer: COMMERCIAL

## 2019-01-17 DIAGNOSIS — Z01.00 ROUTINE EYE EXAM: Primary | ICD-10-CM

## 2019-01-17 DIAGNOSIS — H01.00A BLEPHARITIS OF UPPER AND LOWER EYELIDS OF BOTH EYES, UNSPECIFIED TYPE: ICD-10-CM

## 2019-01-17 DIAGNOSIS — H52.7 REFRACTIVE ERROR: ICD-10-CM

## 2019-01-17 DIAGNOSIS — H01.00B BLEPHARITIS OF UPPER AND LOWER EYELIDS OF BOTH EYES, UNSPECIFIED TYPE: ICD-10-CM

## 2019-01-17 PROCEDURE — 92014 COMPRE OPH EXAM EST PT 1/>: CPT | Mod: HCNC,S$GLB,, | Performed by: OPTOMETRIST

## 2019-01-17 PROCEDURE — 92014 PR EYE EXAM, EST PATIENT,COMPREHESV: ICD-10-PCS | Mod: HCNC,S$GLB,, | Performed by: OPTOMETRIST

## 2019-01-17 PROCEDURE — 99999 PR PBB SHADOW E&M-EST. PATIENT-LVL I: ICD-10-PCS | Mod: PBBFAC,HCNC,, | Performed by: OPTOMETRIST

## 2019-01-17 PROCEDURE — 92015 PR REFRACTION: ICD-10-PCS | Mod: HCNC,S$GLB,, | Performed by: OPTOMETRIST

## 2019-01-17 PROCEDURE — 99999 PR PBB SHADOW E&M-EST. PATIENT-LVL I: CPT | Mod: PBBFAC,HCNC,, | Performed by: OPTOMETRIST

## 2019-01-17 PROCEDURE — 92015 DETERMINE REFRACTIVE STATE: CPT | Mod: HCNC,S$GLB,, | Performed by: OPTOMETRIST

## 2019-01-17 NOTE — PROGRESS NOTES
Subjective:       Patient ID: Nany Crenshaw is a 89 y.o. female      Chief Complaint   Patient presents with    Concerns About Ocular Health     History of Present Illness  Dls: 2/2/16 Dr. Lambert     89 y/o female presents today for ocular health check.   Pt is here with daughter as . Pt states no changes in vision. Pt wears bifocal glasses. Pt has FBS OS on and off.     No tearing  + itching  No burning  No pain  No ha's  No floaters  No flashes     Eye meds  otc gtts ou prn        Assessment/Plan:     1. Routine eye exam  [facility]emed vision    2. Blepharitis of upper and lower eyelids of both eyes, unspecified type  Educated patient on blepharitis and options of treatment including artificial tears, lid scrubs and warm compresses. Discussed chronicity.    3. Refractive error  Educated patient on refractive error and discussed lens options. Dispensed updated spectacle Rx. Educated about adaptation period to new specs.    Eyeglass Final Rx     Eyeglass Final Rx       Sphere Cylinder Axis Add    Right -0.25 +1.00 025 +2.75    Left -0.50 +0.75 045 +2.75    Expiration Date:  1/18/2020                  Follow-up in about 1 year (around 1/17/2020).

## 2019-01-24 ENCOUNTER — OFFICE VISIT (OUTPATIENT)
Dept: CARDIOLOGY | Facility: CLINIC | Age: 84
End: 2019-01-24
Payer: MEDICARE

## 2019-01-24 VITALS
OXYGEN SATURATION: 95 % | RESPIRATION RATE: 20 BRPM | SYSTOLIC BLOOD PRESSURE: 116 MMHG | WEIGHT: 116.88 LBS | BODY MASS INDEX: 24.42 KG/M2 | HEART RATE: 69 BPM | DIASTOLIC BLOOD PRESSURE: 64 MMHG

## 2019-01-24 DIAGNOSIS — I73.9 PERIPHERAL VASCULAR DISEASE, UNSPECIFIED: ICD-10-CM

## 2019-01-24 DIAGNOSIS — I25.83 CORONARY ARTERY DISEASE DUE TO LIPID RICH PLAQUE: Primary | ICD-10-CM

## 2019-01-24 DIAGNOSIS — I10 BENIGN HYPERTENSION: ICD-10-CM

## 2019-01-24 DIAGNOSIS — I82.591 CHRONIC EMBOLISM AND THROMBOSIS OF OTHER SPECIFIED DEEP VEIN OF RIGHT LOWER EXTREMITY: ICD-10-CM

## 2019-01-24 DIAGNOSIS — E78.00 PURE HYPERCHOLESTEROLEMIA: ICD-10-CM

## 2019-01-24 DIAGNOSIS — E03.9 HYPOTHYROIDISM (ACQUIRED): ICD-10-CM

## 2019-01-24 DIAGNOSIS — I25.10 CORONARY ARTERY DISEASE DUE TO LIPID RICH PLAQUE: Primary | ICD-10-CM

## 2019-01-24 DIAGNOSIS — I82.401 ACUTE DEEP VEIN THROMBOSIS (DVT) OF RIGHT LOWER EXTREMITY, UNSPECIFIED VEIN: ICD-10-CM

## 2019-01-24 DIAGNOSIS — I25.10 CAD (CORONARY ARTERY DISEASE): ICD-10-CM

## 2019-01-24 PROCEDURE — 93000 EKG 12-LEAD: ICD-10-PCS | Mod: HCNC,S$GLB,, | Performed by: INTERNAL MEDICINE

## 2019-01-24 PROCEDURE — 99499 UNLISTED E&M SERVICE: CPT | Mod: HCNC,S$GLB,, | Performed by: INTERNAL MEDICINE

## 2019-01-24 PROCEDURE — 93000 ELECTROCARDIOGRAM COMPLETE: CPT | Mod: HCNC,S$GLB,, | Performed by: INTERNAL MEDICINE

## 2019-01-24 PROCEDURE — 99214 PR OFFICE/OUTPT VISIT, EST, LEVL IV, 30-39 MIN: ICD-10-PCS | Mod: HCNC,S$GLB,, | Performed by: INTERNAL MEDICINE

## 2019-01-24 PROCEDURE — 99499 RISK ADDL DX/OHS AUDIT: ICD-10-PCS | Mod: HCNC,S$GLB,, | Performed by: INTERNAL MEDICINE

## 2019-01-24 PROCEDURE — 99214 OFFICE O/P EST MOD 30 MIN: CPT | Mod: HCNC,S$GLB,, | Performed by: INTERNAL MEDICINE

## 2019-01-24 PROCEDURE — 1101F PR PT FALLS ASSESS DOC 0-1 FALLS W/OUT INJ PAST YR: ICD-10-PCS | Mod: HCNC,CPTII,S$GLB, | Performed by: INTERNAL MEDICINE

## 2019-01-24 PROCEDURE — 1101F PT FALLS ASSESS-DOCD LE1/YR: CPT | Mod: HCNC,CPTII,S$GLB, | Performed by: INTERNAL MEDICINE

## 2019-01-24 PROCEDURE — 99999 PR PBB SHADOW E&M-EST. PATIENT-LVL III: ICD-10-PCS | Mod: PBBFAC,HCNC,, | Performed by: INTERNAL MEDICINE

## 2019-01-24 PROCEDURE — 99999 PR PBB SHADOW E&M-EST. PATIENT-LVL III: CPT | Mod: PBBFAC,HCNC,, | Performed by: INTERNAL MEDICINE

## 2019-01-24 NOTE — PROGRESS NOTES
Subjective:    Patient ID:  Nany Crenshaw is a 89 y.o. female who presents for follow-up of No chief complaint on file.      Coronary Artery Disease   Symptoms include muscle weakness.    previous history:  Here for follow-up of DVT and coronary artery disease.  She's had some inflammation and prominence of her varicosities in the right leg.  She's mainly worried about recurrence of clot of blood thinners.  She denies any worsening cardiopulmonary complaints.  She's not had any chest pain, shortness of breath or palpitations.  She denies any PND, orthopnea or lower edema.  She is not expressing dizziness, presyncope or syncope.    Today:  Here for follow-up of DVT and coronary artery disease.  She still has some prominence in varicosities in her legs.  She says at times it gets red around her right ankle but does not cause her pain.  She tolerated her blood thinner as had no problems.  She recently went to [facility] without any issues on the airplane.  Do a trip to [place] planned as well.  She denies any PND, orthopnea or lower extremity edema otherwise.  She's not expressing dizziness, presyncope or syncope.      Review of Systems   Constitution: Negative.   HENT: Negative.    Eyes: Negative.    Cardiovascular: Negative for dyspnea on exertion, irregular heartbeat, near-syncope, orthopnea, paroxysmal nocturnal dyspnea and syncope.   Skin: Negative.    Musculoskeletal: Positive for joint pain and muscle weakness.   Gastrointestinal: Negative for abdominal pain, constipation and diarrhea.   Genitourinary: Negative for dysuria.   Neurological: Negative.    Psychiatric/Behavioral: Negative.         Objective:    Physical Exam   Constitutional: She is oriented to person, place, and time. She appears well-developed and well-nourished.   HENT:   Head: Normocephalic and atraumatic.   Eyes: Conjunctivae and EOM are normal. Pupils are equal, round, and reactive to light.   Neck: Normal range of motion. Neck supple. No  thyromegaly present.   Cardiovascular: Normal rate and regular rhythm.   No murmur heard.  Pulmonary/Chest: Effort normal and breath sounds normal. No respiratory distress.   Abdominal: Soft. Bowel sounds are normal.   Musculoskeletal: Normal range of motion. She exhibits no edema.   Prominent varicosities with some inflammation right greater than left   Neurological: She is alert and oriented to person, place, and time.   Skin: Skin is warm and dry.   Psychiatric: She has a normal mood and affect. Her behavior is normal.       lower extremity ultrasound:   11-17  Nonocclusive thrombus in the right femoral vein and right popliteal vein.    Echo:  CONCLUSIONS     1 - Normal left ventricular systolic function (EF 55-60%).     2 - Mild aortic regurgitation.     LDL- 78   5-18  CBC and CMP within normal limits apart from mildly elevated T bili    Carotid ultrasound:  5-18  CONCLUSIONS   There is 20 - 39% right Internal Carotid stenosis.  There is 20 - 39% left Internal Carotid stenosis.    Lower extremity arterial ultrasound:  Impression:  1-  no hemodynamically significant plaque bilaterally    Lower extremity venous ultrasound:  TEST DESCRIPTION   Impression:    RIGHT:  Chronic occlusive DVT of the Femoral Vein.    LEFT:  No evidence of Left lower extremity DVT.    Assessment:       1. Coronary artery disease due to lipid rich plaque    2. 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 Acute deep vein thrombosis (DVT) of right lower extremity, unspecified vein    4. Benign hypertension    5. Hypothyroidism (acquired)    6. Pure hypercholesterolemia    7. Peripheral vascular disease, unspecified    8. CAD (coronary artery disease)         Plan:       -cont med tx  -cont to follow sx mainly  -Diet and exercise tolerated  -On Eliquis for OAC indefinitely for recurrent DVT      RTC 12 months

## 2019-02-12 ENCOUNTER — OFFICE VISIT (OUTPATIENT)
Dept: PODIATRY | Facility: CLINIC | Age: 84
End: 2019-02-12
Payer: MEDICARE

## 2019-02-12 VITALS — BODY MASS INDEX: 24.35 KG/M2 | HEIGHT: 58 IN | WEIGHT: 116 LBS

## 2019-02-12 DIAGNOSIS — B35.1 ONYCHOMYCOSIS DUE TO DERMATOPHYTE: Primary | ICD-10-CM

## 2019-02-12 DIAGNOSIS — I73.9 PERIPHERAL VASCULAR DISEASE, UNSPECIFIED: ICD-10-CM

## 2019-02-12 PROCEDURE — 1101F PT FALLS ASSESS-DOCD LE1/YR: CPT | Mod: HCNC,CPTII,S$GLB, | Performed by: PODIATRIST

## 2019-02-12 PROCEDURE — 1101F PR PT FALLS ASSESS DOC 0-1 FALLS W/OUT INJ PAST YR: ICD-10-PCS | Mod: HCNC,CPTII,S$GLB, | Performed by: PODIATRIST

## 2019-02-12 PROCEDURE — 99213 PR OFFICE/OUTPT VISIT, EST, LEVL III, 20-29 MIN: ICD-10-PCS | Mod: HCNC,S$GLB,, | Performed by: PODIATRIST

## 2019-02-12 PROCEDURE — 99999 PR PBB SHADOW E&M-EST. PATIENT-LVL III: ICD-10-PCS | Mod: PBBFAC,HCNC,, | Performed by: PODIATRIST

## 2019-02-12 PROCEDURE — 99213 OFFICE O/P EST LOW 20 MIN: CPT | Mod: HCNC,S$GLB,, | Performed by: PODIATRIST

## 2019-02-12 PROCEDURE — 99999 PR PBB SHADOW E&M-EST. PATIENT-LVL III: CPT | Mod: PBBFAC,HCNC,, | Performed by: PODIATRIST

## 2019-02-12 RX ORDER — CICLOPIROX 80 MG/ML
SOLUTION TOPICAL NIGHTLY
Qty: 1 BOTTLE | Refills: 3 | Status: SHIPPED | OUTPATIENT
Start: 2019-02-12 | End: 2021-04-04 | Stop reason: CLARIF

## 2019-02-19 NOTE — PROGRESS NOTES
Subjective:      Patient ID: Nany Crenshaw is a 89 y.o. female.    Chief Complaint: Peripheral Vascular Disease (callus pain Pcp Dr. Mcdaniel 2/6/17)    Nany is a 89 y.o. female who presents to the clinic for evaluation and treatment of high risk feet. Nany has a past medical history of Colon polyp, Coronary artery disease, Diverticulosis, Fracture of right distal radius, GERD (gastroesophageal reflux disease), Hypertension, Hypothyroidism (acquired), Osteoporosis, post-menopausal, and Scoliosis. The patient's chief complaint is long, thick toenails. This patient has documented high risk feet requiring routine maintenance secondary to peripheral vascular disease.    PCP: Ivette Mcdaniel MD    Date Last Seen by PCP: none found    Current shoe gear:  Affected Foot: Tennis shoes     Unaffected Foot: Tennis shoes    Last encounter in this department: Visit date not found    Hemoglobin A1C   Date Value Ref Range Status   01/20/2005 5.6 4.5 - 6.2 % Final       Review of Systems   Constitution: Negative for chills, diaphoresis, fever and weakness.   Cardiovascular: Negative for claudication, cyanosis, leg swelling and syncope.   Respiratory: Negative for cough and shortness of breath.    Skin: Positive for color change, nail changes and suspicious lesions.   Musculoskeletal: Negative for falls, joint pain, muscle cramps and muscle weakness.   Gastrointestinal: Negative for diarrhea, nausea and vomiting.   Neurological: Negative for disturbances in coordination, numbness, paresthesias, sensory change and tremors.   Psychiatric/Behavioral: Negative for altered mental status.           Objective:      Physical Exam   Constitutional: She appears well-developed.   Cardiovascular:   Dorsalis pedis and posterior tibial pulses are diminished Bilaterally. Toes are cool to touch. Feet are warm proximally.There is decreased digital hair. Skin is atrophic, slightly hyperpigmented, and mildly edematous    Pulmonary/Chest: No  respiratory distress.   Musculoskeletal: She exhibits tenderness. She exhibits no edema.   Adequate joint range of motion without pain, limitation, nor crepitation Bilateral feet and ankle joints. Muscle strength is 5/5 in all groups bilaterally.    Feet:   Right Foot:   Skin Integrity: Positive for callus and dry skin. Negative for ulcer or skin breakdown.   Left Foot:   Skin Integrity: Positive for dry skin. Negative for ulcer.   Neurological: She is alert.   [employer]-Lela 5.07 monofilamant testing is diminished Jerald feet. Sharp/dull sensation diminished Bilaterally. Light touch absent Bilaterally.    Skin: Skin is dry. No erythema.   Nails x10 are elongated by 2-6mm's, thickened by 3-5 mm's, dystrophic, and are darkened in coloration . Xerosis Bilaterally.     Focal hyperkeratotic lesion consisting entirely of hyperkeratotic tissue without open skin, drainage, pus, fluctuance, malodor, or signs of infection: right submet head 2 and left submet 5.        Psychiatric: She has a normal mood and affect.             Assessment:       Encounter Diagnoses   Name Primary?    Onychomycosis due to dermatophyte Yes    Peripheral vascular disease, unspecified          Plan:       Nany was seen today for peripheral vascular disease.    Diagnoses and all orders for this visit:    Onychomycosis due to dermatophyte    Peripheral vascular disease, unspecified    Other orders  -     ciclopirox (PENLAC) 8 % Soln; Apply topically nightly.      I counseled the patient on her conditions, their implications and medical management.    Shoe inspection. Foot Education. Patient instructed on proper foot hygeine. We discussed wearing proper shoe gear, daily foot inspections, never walking without protective shoe gear, never putting sharp instruments to feet.  We also discussed padding and shoes with high toe boxes for  foot deformities.     - With patient's permission, right 1, 2, 3, 4, 5 and left 1, 2, 3, 4, 5 toenails were  aggressively reduced and debrided  to their soft tissue attachment mechanically with nail nipper, removing all offending nail and debris. The porokeratotic tissue was pared x 2 with a 15 blade right submet head 2 and left submet 5 .  Patient relates relief following the procedure. Patient will continue to monitor the areas daily, inspect her feet, wear protective shoe gear when ambulatory, moisturizer to maintain skin integrity and follow in this office in approximately 3 months, sooner p.r.n.    At patient's request, I discussed different treatments for toenail fungus. We discussed oral antifungals but I did not recommend them as a first line treatment since the medication is taken internally and can have side effects such as rash, taste disturbances, and liver enzyme elevation. We discussed topical Penlac to be applied daily and removed weekly. Pt. Expresses understanding and would like to try the Penlac. Rx sent to the pharmacy.         F/u  9 weeks     Marisol Newman DPM

## 2019-03-09 RX ORDER — LISINOPRIL 10 MG/1
TABLET ORAL
Qty: 135 TABLET | Refills: 0 | Status: SHIPPED | OUTPATIENT
Start: 2019-03-09 | End: 2019-06-02 | Stop reason: SDUPTHER

## 2019-04-03 RX ORDER — APIXABAN 5 MG/1
TABLET, FILM COATED ORAL
Qty: 60 TABLET | Refills: 0 | Status: SHIPPED | OUTPATIENT
Start: 2019-04-03 | End: 2019-04-29 | Stop reason: SDUPTHER

## 2019-04-08 RX ORDER — LEVOTHYROXINE SODIUM 50 UG/1
TABLET ORAL
Qty: 90 TABLET | Refills: 1 | Status: SHIPPED | OUTPATIENT
Start: 2019-04-08 | End: 2019-09-25 | Stop reason: SDUPTHER

## 2019-04-18 ENCOUNTER — OFFICE VISIT (OUTPATIENT)
Dept: PODIATRY | Facility: CLINIC | Age: 84
End: 2019-04-18
Payer: MEDICARE

## 2019-04-18 VITALS — WEIGHT: 116 LBS | HEIGHT: 58 IN | BODY MASS INDEX: 24.35 KG/M2

## 2019-04-18 DIAGNOSIS — L84 CORN OR CALLUS: ICD-10-CM

## 2019-04-18 DIAGNOSIS — B35.1 ONYCHOMYCOSIS DUE TO DERMATOPHYTE: Primary | ICD-10-CM

## 2019-04-18 DIAGNOSIS — I73.9 PERIPHERAL VASCULAR DISEASE, UNSPECIFIED: ICD-10-CM

## 2019-04-18 PROCEDURE — 11056 PARNG/CUTG B9 HYPRKR LES 2-4: CPT | Mod: HCNC,Q9,S$GLB, | Performed by: PODIATRIST

## 2019-04-18 PROCEDURE — 11721 DEBRIDE NAIL 6 OR MORE: CPT | Mod: HCNC,59,Q9,S$GLB | Performed by: PODIATRIST

## 2019-04-18 PROCEDURE — 99999 PR PBB SHADOW E&M-EST. PATIENT-LVL III: ICD-10-PCS | Mod: PBBFAC,HCNC,, | Performed by: PODIATRIST

## 2019-04-18 PROCEDURE — 99499 UNLISTED E&M SERVICE: CPT | Mod: HCNC,S$GLB,, | Performed by: PODIATRIST

## 2019-04-18 PROCEDURE — 11721 ROUTINE FOOT CARE: ICD-10-PCS | Mod: HCNC,59,Q9,S$GLB | Performed by: PODIATRIST

## 2019-04-18 PROCEDURE — 99999 PR PBB SHADOW E&M-EST. PATIENT-LVL III: CPT | Mod: PBBFAC,HCNC,, | Performed by: PODIATRIST

## 2019-04-18 PROCEDURE — 99499 NO LOS: ICD-10-PCS | Mod: HCNC,S$GLB,, | Performed by: PODIATRIST

## 2019-04-18 PROCEDURE — 11056 ROUTINE FOOT CARE: ICD-10-PCS | Mod: HCNC,Q9,S$GLB, | Performed by: PODIATRIST

## 2019-04-18 NOTE — PROCEDURES
Routine Foot Care  Date/Time: 4/18/2019 3:55 PM  Performed by: Marisol Newman DPM  Authorized by: Marisol Newman DPM     Hyperkeratotic Skin Lesions?: Yes    Number of trimmed lesions:  2  Location(s):  Right Plantar and Left Plantar    Nail Care Type:  Debride  Location(s): All  (Left 1st Toe, Left 3rd Toe, Left 2nd Toe, Left 4th Toe, Left 5th Toe, Right 1st Toe, Right 2nd Toe, Right 3rd Toe, Right 4th Toe and Right 5th Toe)  Patient tolerance:  Patient tolerated the procedure well with no immediate complications

## 2019-04-29 RX ORDER — APIXABAN 5 MG/1
TABLET, FILM COATED ORAL
Qty: 60 TABLET | Refills: 0 | Status: SHIPPED | OUTPATIENT
Start: 2019-04-29 | End: 2019-06-02 | Stop reason: SDUPTHER

## 2019-06-02 RX ORDER — SIMVASTATIN 40 MG/1
TABLET, FILM COATED ORAL
Qty: 90 TABLET | Refills: 1 | Status: SHIPPED | OUTPATIENT
Start: 2019-06-02 | End: 2019-09-25 | Stop reason: SDUPTHER

## 2019-06-02 RX ORDER — LISINOPRIL 10 MG/1
TABLET ORAL
Qty: 135 TABLET | Refills: 1 | Status: SHIPPED | OUTPATIENT
Start: 2019-06-02 | End: 2019-09-25 | Stop reason: SDUPTHER

## 2019-06-02 RX ORDER — EZETIMIBE 10 MG/1
TABLET ORAL
Qty: 90 TABLET | Refills: 1 | Status: SHIPPED | OUTPATIENT
Start: 2019-06-02 | End: 2019-09-25 | Stop reason: SDUPTHER

## 2019-06-03 RX ORDER — APIXABAN 5 MG/1
TABLET, FILM COATED ORAL
Qty: 60 TABLET | Refills: 0 | Status: SHIPPED | OUTPATIENT
Start: 2019-06-03 | End: 2019-07-10 | Stop reason: SDUPTHER

## 2019-06-09 ENCOUNTER — PATIENT MESSAGE (OUTPATIENT)
Dept: INTERNAL MEDICINE | Facility: CLINIC | Age: 84
End: 2019-06-09

## 2019-06-11 ENCOUNTER — OFFICE VISIT (OUTPATIENT)
Dept: INTERNAL MEDICINE | Facility: CLINIC | Age: 84
End: 2019-06-11
Payer: MEDICARE

## 2019-06-11 VITALS
SYSTOLIC BLOOD PRESSURE: 130 MMHG | DIASTOLIC BLOOD PRESSURE: 62 MMHG | BODY MASS INDEX: 24.48 KG/M2 | WEIGHT: 116.63 LBS | HEIGHT: 58 IN

## 2019-06-11 DIAGNOSIS — E78.5 HYPERLIPIDEMIA, UNSPECIFIED HYPERLIPIDEMIA TYPE: ICD-10-CM

## 2019-06-11 DIAGNOSIS — Z00.00 ANNUAL PHYSICAL EXAM: Primary | ICD-10-CM

## 2019-06-11 DIAGNOSIS — M81.0 OSTEOPOROSIS, POST-MENOPAUSAL: ICD-10-CM

## 2019-06-11 DIAGNOSIS — I10 BENIGN HYPERTENSION: ICD-10-CM

## 2019-06-11 DIAGNOSIS — I82.4Y9 DEEP VEIN THROMBOSIS (DVT) OF PROXIMAL LOWER EXTREMITY, UNSPECIFIED CHRONICITY, UNSPECIFIED LATERALITY: ICD-10-CM

## 2019-06-11 DIAGNOSIS — E03.9 HYPOTHYROIDISM (ACQUIRED): ICD-10-CM

## 2019-06-11 PROCEDURE — 99397 PR PREVENTIVE VISIT,EST,65 & OVER: ICD-10-PCS | Mod: HCNC,S$GLB,, | Performed by: INTERNAL MEDICINE

## 2019-06-11 PROCEDURE — 99397 PER PM REEVAL EST PAT 65+ YR: CPT | Mod: HCNC,S$GLB,, | Performed by: INTERNAL MEDICINE

## 2019-06-11 PROCEDURE — 99999 PR PBB SHADOW E&M-EST. PATIENT-LVL III: CPT | Mod: PBBFAC,HCNC,, | Performed by: INTERNAL MEDICINE

## 2019-06-11 PROCEDURE — 99999 PR PBB SHADOW E&M-EST. PATIENT-LVL III: ICD-10-PCS | Mod: PBBFAC,HCNC,, | Performed by: INTERNAL MEDICINE

## 2019-06-11 NOTE — PROGRESS NOTES
"Subjective:       Patient ID: Nany Crenshaw is a 89 y.o. female.    Chief Complaint: Annual Exam   This is a 89-year-old who presents today for physical.  Patient reports that she has been has issues with her blood pressure overall has been following with her Cardiology in her pressure has been well controlled she is presently on lisinopril 1 and half tablet today along with her metoprolol seems to be keeping her numbers well controlled.  Remains on her thyroid medication without difficulty.  She had history of DVT with recurrence and is now on Eliquis long-term.  She did see Cardiology recently and no change in her medications.  She is brought in by her daughter today who reports that overall she has been doing well she has some slight decline in her memory over time but no concerns about home safety or falls she is good about giving her own medications and feels she is slowing down a bit over time   sometimes has a bit of difficulty with her sleep.    HPI  Review of Systems   Constitutional: Negative for fever.   Respiratory: Negative for cough, shortness of breath and wheezing.    Cardiovascular: Negative for chest pain and palpitations.   Gastrointestinal: Negative for abdominal pain and constipation.   Neurological: Negative for dizziness.   Psychiatric/Behavioral:        Down at times  Memory mild decresed  Daughter reprots        Objective:     Blood pressure 130/62, height 4' 10" (1.473 m), weight 52.9 kg (116 lb 10 oz).    Physical Exam   Constitutional: No distress.   HENT:   Head: Normocephalic.   Mouth/Throat: Oropharynx is clear and moist.   Nasal leasion imrpoved    Eyes: No scleral icterus.   Neck: Neck supple.   Cardiovascular: Normal rate, regular rhythm and normal heart sounds. Exam reveals no gallop and no friction rub.   No murmur heard.  Pulmonary/Chest: Effort normal and breath sounds normal. No respiratory distress.   Breast : normal no masses or tenderness    Abdominal: Soft. Bowel sounds are " normal. She exhibits no mass. There is no tenderness.   Musculoskeletal: She exhibits no edema.   Trace edema     Foot deformity    Neurological: She is alert.   Skin: No erythema.   Psychiatric: She has a normal mood and affect.   Vitals reviewed.      Assessment:       1. Annual physical exam    2. Hypothyroidism (acquired)    3. Hyperlipidemia, unspecified hyperlipidemia type    4. Benign hypertension    5. Osteoporosis, post-menopausal    6. Deep vein thrombosis (DVT) of proximal lower extremity, unspecified chronicity, unspecified laterality        Plan:       Nany was seen today for annual exam.    Diagnoses and all orders for this visit:    Annual physical exam  -     Basic metabolic panel; Future  -     CBC auto differential; Future  -     Hepatic function panel; Future  -     Lipid panel; Future  -     TSH; Future  -     T4, free; Future    Hypothyroidism (acquired)  Continue present regimen recheck blood work and review  -     Basic metabolic panel; Future  -     CBC auto differential; Future  -     Hepatic function panel; Future  -     Lipid panel; Future  -     TSH; Future  -     T4, free; Future    Hyperlipidemia, unspecified hyperlipidemia type  She is on Zetia and simvastatin will continue  -     Lipid panel; Future    Discussed memory and daughter concerns  Would hold on additional medications  Continued home safety    Blood pressure well controlled continue present regimen    Hx skin cancer stable they will follow up with dermatology if concerns or lesions diuscussed     Continued fall precautions she stays active and does her own ADLs.    Foot deformity and toe nail issues she continues to follow with podiatry     dvt hx of   With recurrance she is remains on long-term anticoagulation with Eliquis tolerating without difficulty    His we discussed consider update shingrix when available  and tetanus vaccine at her convenience

## 2019-06-13 ENCOUNTER — LAB VISIT (OUTPATIENT)
Dept: LAB | Facility: HOSPITAL | Age: 84
End: 2019-06-13
Attending: INTERNAL MEDICINE
Payer: MEDICARE

## 2019-06-13 DIAGNOSIS — Z00.00 ANNUAL PHYSICAL EXAM: ICD-10-CM

## 2019-06-13 DIAGNOSIS — E03.9 HYPOTHYROIDISM (ACQUIRED): ICD-10-CM

## 2019-06-13 DIAGNOSIS — E78.5 HYPERLIPIDEMIA, UNSPECIFIED HYPERLIPIDEMIA TYPE: ICD-10-CM

## 2019-06-13 LAB
ALBUMIN SERPL BCP-MCNC: 3.6 G/DL (ref 3.5–5.2)
ALP SERPL-CCNC: 67 U/L (ref 55–135)
ALT SERPL W/O P-5'-P-CCNC: 19 U/L (ref 10–44)
ANION GAP SERPL CALC-SCNC: 8 MMOL/L (ref 8–16)
AST SERPL-CCNC: 23 U/L (ref 10–40)
BASOPHILS # BLD AUTO: 0.03 K/UL (ref 0–0.2)
BASOPHILS NFR BLD: 0.5 % (ref 0–1.9)
BILIRUB DIRECT SERPL-MCNC: 0.5 MG/DL (ref 0.1–0.3)
BILIRUB SERPL-MCNC: 1.2 MG/DL (ref 0.1–1)
BUN SERPL-MCNC: 11 MG/DL (ref 10–30)
CALCIUM SERPL-MCNC: 9.8 MG/DL (ref 8.7–10.5)
CHLORIDE SERPL-SCNC: 103 MMOL/L (ref 95–110)
CHOLEST SERPL-MCNC: 192 MG/DL (ref 120–199)
CHOLEST/HDLC SERPL: 2.2 {RATIO} (ref 2–5)
CO2 SERPL-SCNC: 27 MMOL/L (ref 23–29)
CREAT SERPL-MCNC: 0.7 MG/DL (ref 0.5–1.4)
DIFFERENTIAL METHOD: NORMAL
EOSINOPHIL # BLD AUTO: 0.1 K/UL (ref 0–0.5)
EOSINOPHIL NFR BLD: 1.4 % (ref 0–8)
ERYTHROCYTE [DISTWIDTH] IN BLOOD BY AUTOMATED COUNT: 13.2 % (ref 11.5–14.5)
EST. GFR  (AFRICAN AMERICAN): >60 ML/MIN/1.73 M^2
EST. GFR  (NON AFRICAN AMERICAN): >60 ML/MIN/1.73 M^2
GLUCOSE SERPL-MCNC: 99 MG/DL (ref 70–110)
HCT VFR BLD AUTO: 42.2 % (ref 37–48.5)
HDLC SERPL-MCNC: 88 MG/DL (ref 40–75)
HDLC SERPL: 45.8 % (ref 20–50)
HGB BLD-MCNC: 13.5 G/DL (ref 12–16)
IMM GRANULOCYTES # BLD AUTO: 0.01 K/UL (ref 0–0.04)
IMM GRANULOCYTES NFR BLD AUTO: 0.2 % (ref 0–0.5)
LDLC SERPL CALC-MCNC: 86.6 MG/DL (ref 63–159)
LYMPHOCYTES # BLD AUTO: 1.1 K/UL (ref 1–4.8)
LYMPHOCYTES NFR BLD: 19.1 % (ref 18–48)
MCH RBC QN AUTO: 29.7 PG (ref 27–31)
MCHC RBC AUTO-ENTMCNC: 32 G/DL (ref 32–36)
MCV RBC AUTO: 93 FL (ref 82–98)
MONOCYTES # BLD AUTO: 0.4 K/UL (ref 0.3–1)
MONOCYTES NFR BLD: 6.2 % (ref 4–15)
NEUTROPHILS # BLD AUTO: 4.1 K/UL (ref 1.8–7.7)
NEUTROPHILS NFR BLD: 72.6 % (ref 38–73)
NONHDLC SERPL-MCNC: 104 MG/DL
NRBC BLD-RTO: 0 /100 WBC
PLATELET # BLD AUTO: 209 K/UL (ref 150–350)
PMV BLD AUTO: 9.8 FL (ref 9.2–12.9)
POTASSIUM SERPL-SCNC: 4.8 MMOL/L (ref 3.5–5.1)
PROT SERPL-MCNC: 6.4 G/DL (ref 6–8.4)
RBC # BLD AUTO: 4.54 M/UL (ref 4–5.4)
SODIUM SERPL-SCNC: 138 MMOL/L (ref 136–145)
T4 FREE SERPL-MCNC: 0.94 NG/DL (ref 0.71–1.51)
TRIGL SERPL-MCNC: 87 MG/DL (ref 30–150)
TSH SERPL DL<=0.005 MIU/L-ACNC: 0.92 UIU/ML (ref 0.4–4)
WBC # BLD AUTO: 5.65 K/UL (ref 3.9–12.7)

## 2019-06-13 PROCEDURE — 80061 LIPID PANEL: CPT | Mod: HCNC

## 2019-06-13 PROCEDURE — 85025 COMPLETE CBC W/AUTO DIFF WBC: CPT | Mod: HCNC

## 2019-06-13 PROCEDURE — 84439 ASSAY OF FREE THYROXINE: CPT | Mod: HCNC

## 2019-06-13 PROCEDURE — 36415 COLL VENOUS BLD VENIPUNCTURE: CPT | Mod: HCNC,PO

## 2019-06-13 PROCEDURE — 84443 ASSAY THYROID STIM HORMONE: CPT | Mod: HCNC

## 2019-06-13 PROCEDURE — 80048 BASIC METABOLIC PNL TOTAL CA: CPT | Mod: HCNC

## 2019-06-13 PROCEDURE — 80076 HEPATIC FUNCTION PANEL: CPT | Mod: HCNC

## 2019-06-25 ENCOUNTER — OFFICE VISIT (OUTPATIENT)
Dept: PODIATRY | Facility: CLINIC | Age: 84
End: 2019-06-25
Payer: MEDICARE

## 2019-06-25 VITALS — BODY MASS INDEX: 24.35 KG/M2 | WEIGHT: 116 LBS | HEIGHT: 58 IN

## 2019-06-25 DIAGNOSIS — I73.9 PERIPHERAL VASCULAR DISEASE, UNSPECIFIED: ICD-10-CM

## 2019-06-25 DIAGNOSIS — B35.1 ONYCHOMYCOSIS DUE TO DERMATOPHYTE: Primary | ICD-10-CM

## 2019-06-25 DIAGNOSIS — L84 CORN OR CALLUS: ICD-10-CM

## 2019-06-25 PROCEDURE — 11721 DEBRIDE NAIL 6 OR MORE: CPT | Mod: 59,Q9,HCNC,S$GLB | Performed by: PODIATRIST

## 2019-06-25 PROCEDURE — 99499 NO LOS: ICD-10-PCS | Mod: HCNC,S$GLB,, | Performed by: PODIATRIST

## 2019-06-25 PROCEDURE — 11056 ROUTINE FOOT CARE: ICD-10-PCS | Mod: Q9,HCNC,S$GLB, | Performed by: PODIATRIST

## 2019-06-25 PROCEDURE — 99499 UNLISTED E&M SERVICE: CPT | Mod: HCNC,S$GLB,, | Performed by: PODIATRIST

## 2019-06-25 PROCEDURE — 99999 PR PBB SHADOW E&M-EST. PATIENT-LVL III: ICD-10-PCS | Mod: PBBFAC,HCNC,, | Performed by: PODIATRIST

## 2019-06-25 PROCEDURE — 99999 PR PBB SHADOW E&M-EST. PATIENT-LVL III: CPT | Mod: PBBFAC,HCNC,, | Performed by: PODIATRIST

## 2019-06-25 PROCEDURE — 11721 ROUTINE FOOT CARE: ICD-10-PCS | Mod: 59,Q9,HCNC,S$GLB | Performed by: PODIATRIST

## 2019-06-25 PROCEDURE — 11056 PARNG/CUTG B9 HYPRKR LES 2-4: CPT | Mod: Q9,HCNC,S$GLB, | Performed by: PODIATRIST

## 2019-06-29 NOTE — PROCEDURES
Routine Foot Care  Date/Time: 6/25/2019 3:00 PM  Performed by: Marisol Newman DPM  Authorized by: Marisol Newman DPM     Consent Done?:  Yes (Verbal)  Hyperkeratotic Skin Lesions?: Yes    Number of trimmed lesions:  2  Location(s):  Right Plantar and Left Plantar    Nail Care Type:  Debride  Location(s): All  (Left 1st Toe, Left 3rd Toe, Left 2nd Toe, Left 4th Toe, Left 5th Toe, Right 1st Toe, Right 2nd Toe, Right 3rd Toe, Right 4th Toe and Right 5th Toe)  Patient tolerance:  Patient tolerated the procedure well with no immediate complications

## 2019-06-29 NOTE — PROGRESS NOTES
Subjective:      Patient ID: Nany Crenshaw is a 89 y.o. female.    Chief Complaint: Foot Pain (PVD (PCP Dr Mcdaniel))    Nany is a 89 y.o. female who presents to the clinic for evaluation and treatment of high risk feet. Nany has a past medical history of Colon polyp, Coronary artery disease, Diverticulosis, Fracture of right distal radius, GERD (gastroesophageal reflux disease), Hypertension, Hypothyroidism (acquired), Osteoporosis, post-menopausal, and Scoliosis. The patient's chief complaint is long, thick toenails. This patient has documented high risk feet requiring routine maintenance secondary to peripheral vascular disease.    PCP: Ivette Mcdaniel MD    Date Last Seen by PCP: none found    Current shoe gear:  Affected Foot: Tennis shoes     Unaffected Foot: Tennis shoes    Last encounter in this department: Visit date not found    Hemoglobin A1C   Date Value Ref Range Status   01/20/2005 5.6 4.5 - 6.2 % Final       Review of Systems   Constitution: Negative for chills, diaphoresis and fever.   Cardiovascular: Negative for claudication, cyanosis, leg swelling and syncope.   Respiratory: Negative for cough and shortness of breath.    Skin: Positive for color change, nail changes and suspicious lesions.   Musculoskeletal: Negative for falls, joint pain, muscle cramps and muscle weakness.   Gastrointestinal: Negative for diarrhea, nausea and vomiting.   Neurological: Negative for disturbances in coordination, numbness, paresthesias, sensory change, tremors and weakness.   Psychiatric/Behavioral: Negative for altered mental status.           Objective:      Physical Exam   Constitutional: She appears well-developed.   Cardiovascular:   Dorsalis pedis and posterior tibial pulses are diminished Bilaterally. Toes are cool to touch. Feet are warm proximally.There is decreased digital hair. Skin is atrophic, slightly hyperpigmented, and mildly edematous    Pulmonary/Chest: No respiratory distress.    Musculoskeletal: She exhibits tenderness. She exhibits no edema.   Adequate joint range of motion without pain, limitation, nor crepitation Bilateral feet and ankle joints. Muscle strength is 5/5 in all groups bilaterally.    Feet:   Right Foot:   Skin Integrity: Positive for callus and dry skin. Negative for ulcer or skin breakdown.   Left Foot:   Skin Integrity: Positive for dry skin. Negative for ulcer.   Neurological: She is alert.   [place] 5.07 monofilamant testing is diminished Jerald feet. Sharp/dull sensation diminished Bilaterally. Light touch absent Bilaterally.    Skin: Skin is dry. No erythema.   Nails x10 are elongated by 2-6mm's, thickened by 3-5 mm's, dystrophic, and are darkened in coloration . Xerosis Bilaterally.     Focal hyperkeratotic lesion consisting entirely of hyperkeratotic tissue without open skin, drainage, pus, fluctuance, malodor, or signs of infection: right submet head 2 and left submet 5.        Psychiatric: She has a normal mood and affect.             Assessment:       Encounter Diagnoses   Name Primary?    Onychomycosis due to dermatophyte Yes    Peripheral vascular disease, unspecified     Corn or callus          Plan:       Nany was seen today for foot pain.    Diagnoses and all orders for this visit:    Onychomycosis due to dermatophyte  -     Routine Foot Care    Peripheral vascular disease, unspecified  -     Routine Foot Care    [place] or callus  -     Routine Foot Care      I counseled the patient on her conditions, their implications and medical management.    Shoe inspection. Foot Education. Patient instructed on proper foot hygeine. We discussed wearing proper shoe gear, daily foot inspections, never walking without protective shoe gear, never putting sharp instruments to feet.  We also discussed padding and shoes with high toe boxes for  foot deformities.     See routine foot care procedure note for nail debridement/callus trimming.       At patient's  request, I discussed different treatments for toenail fungus. We discussed oral antifungals but I did not recommend them as a first line treatment since the medication is taken internally and can have side effects such as rash, taste disturbances, and liver enzyme elevation. We discussed topical Penlac to be applied daily and removed weekly. Pt. Expresses understanding and would like to try the Penlac. Rx sent to the pharmacy.         F/u  9 weeks     Marisol Newman DPM

## 2019-07-02 RX ORDER — APIXABAN 5 MG/1
TABLET, FILM COATED ORAL
Qty: 60 TABLET | Refills: 0 | OUTPATIENT
Start: 2019-07-02

## 2019-07-10 NOTE — TELEPHONE ENCOUNTER
----- Message from Virginia Christie sent at 7/10/2019  8:50 AM CDT -----  Contact: self [phone]  .Type: RX Refill Request    Who Called: self     Refill or New Rx: refill     RX Name and Strength: ELIQUIS 5 mg Tab    Is this a 30 day or 90 day RX: 90 day    Preferred Pharmacy with phone number: .  [facility] [address] [place] [address] e-volo AT [facility] [place] [address] [address] [facility] [place] [address]  Phone: [phone] Fax: [phone]    Local or Mail Order: local     Would the patient rather a call back or a response via My [facility]? Call back     Best Call Back Number: [phone]

## 2019-07-15 RX ORDER — METOPROLOL TARTRATE 25 MG/1
TABLET, FILM COATED ORAL
Qty: 180 TABLET | Refills: 4 | Status: SHIPPED | OUTPATIENT
Start: 2019-07-15 | End: 2020-08-31

## 2019-08-13 ENCOUNTER — PATIENT OUTREACH (OUTPATIENT)
Dept: ADMINISTRATIVE | Facility: OTHER | Age: 84
End: 2019-08-13

## 2019-08-15 ENCOUNTER — OFFICE VISIT (OUTPATIENT)
Dept: PODIATRY | Facility: CLINIC | Age: 84
End: 2019-08-15
Payer: MEDICARE

## 2019-08-15 VITALS — BODY MASS INDEX: 24.35 KG/M2 | WEIGHT: 116 LBS | HEIGHT: 58 IN

## 2019-08-15 DIAGNOSIS — L84 CORN OR CALLUS: ICD-10-CM

## 2019-08-15 DIAGNOSIS — I73.9 PERIPHERAL VASCULAR DISEASE, UNSPECIFIED: Primary | ICD-10-CM

## 2019-08-15 DIAGNOSIS — B35.1 ONYCHOMYCOSIS DUE TO DERMATOPHYTE: ICD-10-CM

## 2019-08-15 PROCEDURE — 99499 NO LOS: ICD-10-PCS | Mod: HCNC,S$GLB,, | Performed by: PODIATRIST

## 2019-08-15 PROCEDURE — 99999 PR PBB SHADOW E&M-EST. PATIENT-LVL III: CPT | Mod: PBBFAC,HCNC,, | Performed by: PODIATRIST

## 2019-08-15 PROCEDURE — 11721 PR DEBRIDEMENT OF NAILS, 6 OR MORE: ICD-10-PCS | Mod: 59,Q9,HCNC,S$GLB | Performed by: PODIATRIST

## 2019-08-15 PROCEDURE — 99999 PR PBB SHADOW E&M-EST. PATIENT-LVL III: ICD-10-PCS | Mod: PBBFAC,HCNC,, | Performed by: PODIATRIST

## 2019-08-15 PROCEDURE — 11057 PARNG/CUTG B9 HYPRKR LES >4: CPT | Mod: Q9,HCNC,S$GLB, | Performed by: PODIATRIST

## 2019-08-15 PROCEDURE — 11721 DEBRIDE NAIL 6 OR MORE: CPT | Mod: 59,Q9,HCNC,S$GLB | Performed by: PODIATRIST

## 2019-08-15 PROCEDURE — 11057 PR TRIM BENIGN HYPERKERATOTIC SKIN LESION,>4: ICD-10-PCS | Mod: Q9,HCNC,S$GLB, | Performed by: PODIATRIST

## 2019-08-15 PROCEDURE — 99499 UNLISTED E&M SERVICE: CPT | Mod: HCNC,S$GLB,, | Performed by: PODIATRIST

## 2019-08-16 NOTE — PROGRESS NOTES
"Subjective:       Patient ID: Nany Crenshaw is a 89 y.o. female.    Chief Complaint: Nail Care (Proc b (PCP Dr Mcdaniel 6/11/19))    HPI  Nany is a 89 y.o. female who presents to the clinic for evaluation and treatment of high risk feet. Nany has a past medical history of Colon polyp, Coronary artery disease, Diverticulosis, Fracture of right distal radius, GERD (gastroesophageal reflux disease), Hypertension, Hypothyroidism (acquired), Osteoporosis, post-menopausal, and Scoliosis. The patient's chief complaint is long, thick toenails. This patient has documented high risk feet requiring routine maintenance secondary to peripheral vascular disease.      Previously seen by my colleague, new to me.    PCP: Ivette Mcdaniel MD    Date Last Seen by PCP:   Chief Complaint   Patient presents with    Nail Care     Proc b (PCP Dr Mcdaniel 6/11/19)       Current shoe gear:  Affected Foot: Casual shoes     Unaffected Foot: Casual shoes    Hemoglobin A1C   Date Value Ref Range Status   01/20/2005 5.6 4.5 - 6.2 % Final     Review of Systems  ROS:  Constitution: Negative for chills, fever, weakness and malaise/fatigue.   HEENT: Negative for headaches.   Cardiovascular: Negative for chest pain and claudication.   Respiratory: Negative for cough and shortness of breath.   Musculoskeletal: Positive for foot pain.  Negative for muscle cramps and muscle weakness.   Gastrointestinal: Negative for nausea and vomiting.   Neurological: Negative for numbness and paresthesias.   Dermatological: Negative for wound.        Objective:       Vitals:    08/15/19 1551   Weight: 52.6 kg (116 lb)   Height: 4' 10" (1.473 m)   PainSc: 0-No pain       Physical Exam  Constitutional:   Patient is oriented to person, place, and time. Vital signs are normal. Appears well-developed and well-nourished.     Vascular:   Dorsalis pedis pulses are 1+ on the right side, and 1+ on the left side.   Posterior tibial pulses are 1+ on the right side, and 1+ on " the left side.   - digital hair growth, capillary fill time to all toes =4 seconds, toes are cool to touch  + swelling feet and ankles    Skin/Dermatological:   Skin is thin, warm, shiny and atrophic. No cyanosis or clubbing. No rashes noted. No open wounds.   Right 1, 2, 3, 4, 5 left 1, 2, 3, 4, 5 toenails yellow discolored, thickened 3 mm, elongated 3 mm with subungual debris and tenderness.  Corns distal third toes. Lateral 5th toes. Medial great toes    Musculoskeletal:   Mild bunions, hammertoes and bunionettes observed.  Decreased range of motion of bilateral midtarsal, subtalar joints, ankle joint dorsiflexion is restricted bilaterally. Muscle strength to tibialis anterior, extensor hallucis longus, extensor digitorum longus, peroneal muscles, flexor hallucis/digotorum longus, posterior tibial and gastrosoleal complex is 5/5.    Neurological:   Negative deficits to sharp/dull, light touch or vibratory sensation bilateral feet           Assessment:       1. Peripheral vascular disease, unspecified    2. Onychomycosis due to dermatophyte    3. Corn or callus        Plan:       Peripheral vascular disease, unspecified    Onychomycosis due to dermatophyte    Corn or callus          Shoe inspection. Foot Education. Patient instructed on proper foot hygeine. We discussed wearing proper shoe gear, daily foot inspections, never walking without protective shoe gear, never putting sharp instruments to feet.  We also discussed padding and shoes with high toe boxes for  foot deformities.    - With patient's permission, right 1, 2, 3, 4, 5 and left 1, 2, 3, 4, 5 toenails were aggressively reduced and debrided  to their soft tissue attachment mechanically with nail nipper, removing all offending nail and debris. The porokeratotic tissue was pared x 6 with a 15 blade.  Patient relates relief following the procedure. Patient will continue to monitor the areas daily, inspect her feet, wear protective shoe gear when  ambulatory, moisturizer to maintain skin integrity and follow in this office in approximately 3 months, sooner p.r.n.

## 2019-09-21 ENCOUNTER — PATIENT OUTREACH (OUTPATIENT)
Dept: ADMINISTRATIVE | Facility: OTHER | Age: 84
End: 2019-09-21

## 2019-09-25 ENCOUNTER — OFFICE VISIT (OUTPATIENT)
Dept: PODIATRY | Facility: CLINIC | Age: 84
End: 2019-09-25
Payer: MEDICARE

## 2019-09-25 VITALS
WEIGHT: 116 LBS | BODY MASS INDEX: 24.35 KG/M2 | DIASTOLIC BLOOD PRESSURE: 60 MMHG | SYSTOLIC BLOOD PRESSURE: 137 MMHG | HEIGHT: 58 IN

## 2019-09-25 DIAGNOSIS — B35.1 ONYCHOMYCOSIS DUE TO DERMATOPHYTE: Primary | ICD-10-CM

## 2019-09-25 DIAGNOSIS — I73.9 PERIPHERAL VASCULAR DISEASE, UNSPECIFIED: ICD-10-CM

## 2019-09-25 DIAGNOSIS — L84 CORN OR CALLUS: ICD-10-CM

## 2019-09-25 PROCEDURE — 99999 PR PBB SHADOW E&M-EST. PATIENT-LVL III: ICD-10-PCS | Mod: PBBFAC,HCNC,, | Performed by: PODIATRIST

## 2019-09-25 PROCEDURE — 11721 ROUTINE FOOT CARE: ICD-10-PCS | Mod: HCNC,Q9,59,S$GLB | Performed by: PODIATRIST

## 2019-09-25 PROCEDURE — 11056 PARNG/CUTG B9 HYPRKR LES 2-4: CPT | Mod: HCNC,Q9,S$GLB, | Performed by: PODIATRIST

## 2019-09-25 PROCEDURE — 11056 ROUTINE FOOT CARE: ICD-10-PCS | Mod: HCNC,Q9,S$GLB, | Performed by: PODIATRIST

## 2019-09-25 PROCEDURE — 99499 UNLISTED E&M SERVICE: CPT | Mod: HCNC,S$GLB,, | Performed by: PODIATRIST

## 2019-09-25 PROCEDURE — 99499 NO LOS: ICD-10-PCS | Mod: HCNC,S$GLB,, | Performed by: PODIATRIST

## 2019-09-25 PROCEDURE — 99999 PR PBB SHADOW E&M-EST. PATIENT-LVL III: CPT | Mod: PBBFAC,HCNC,, | Performed by: PODIATRIST

## 2019-09-25 PROCEDURE — 11721 DEBRIDE NAIL 6 OR MORE: CPT | Mod: HCNC,Q9,59,S$GLB | Performed by: PODIATRIST

## 2019-09-25 RX ORDER — LEVOTHYROXINE SODIUM 50 UG/1
TABLET ORAL
Qty: 90 TABLET | Refills: 1 | Status: SHIPPED | OUTPATIENT
Start: 2019-09-25 | End: 2020-04-16

## 2019-09-25 RX ORDER — SIMVASTATIN 40 MG/1
TABLET, FILM COATED ORAL
Qty: 90 TABLET | Refills: 1 | Status: SHIPPED | OUTPATIENT
Start: 2019-09-25 | End: 2020-03-05

## 2019-09-25 RX ORDER — EZETIMIBE 10 MG/1
TABLET ORAL
Qty: 90 TABLET | Refills: 1 | Status: SHIPPED | OUTPATIENT
Start: 2019-09-25 | End: 2020-06-02

## 2019-09-25 RX ORDER — LISINOPRIL 10 MG/1
TABLET ORAL
Qty: 135 TABLET | Refills: 1 | Status: SHIPPED | OUTPATIENT
Start: 2019-09-25 | End: 2020-06-02

## 2019-09-29 NOTE — PROCEDURES
Routine Foot Care  Date/Time: 9/25/2019 3:30 PM  Performed by: Marisol Newman DPM  Authorized by: Marisol Newman DPM     Hyperkeratotic Skin Lesions?: Yes    Number of trimmed lesions:  2  Location(s):  Right Plantar and Left Plantar    Nail Care Type:  Debride  Location(s): All  (Left 1st Toe, Left 3rd Toe, Left 2nd Toe, Left 4th Toe, Left 5th Toe, Right 1st Toe, Right 2nd Toe, Right 3rd Toe, Right 4th Toe and Right 5th Toe)  Patient tolerance:  Patient tolerated the procedure well with no immediate complications

## 2019-09-29 NOTE — PROGRESS NOTES
"Subjective:       Patient ID: Nany Crenshaw is a 89 y.o. female.    Chief Complaint: Foot Pain (PVD (PCP Dr Mcdaniel)) and Foot Problem    HPI  Nany is a 89 y.o. female who presents to the clinic for evaluation and treatment of high risk feet. Nany has a past medical history of Colon polyp, Coronary artery disease, Diverticulosis, Fracture of right distal radius, GERD (gastroesophageal reflux disease), Hypertension, Hypothyroidism (acquired), Osteoporosis, post-menopausal, and Scoliosis. The patient's chief complaint is long, thick toenails. This patient has documented high risk feet requiring routine maintenance secondary to peripheral vascular disease.        PCP: Ivette Mcdaniel MD    Date Last Seen by PCP:   Chief Complaint   Patient presents with    Foot Pain     PVD (PCP Dr Mcdaniel)    Foot Problem       Current shoe gear:  Affected Foot: Casual shoes     Unaffected Foot: Casual shoes    Hemoglobin A1C   Date Value Ref Range Status   01/20/2005 5.6 4.5 - 6.2 % Final     Review of Systems  ROS:  Constitution: Negative for chills, fever, weakness and malaise/fatigue.   HEENT: Negative for headaches.   Cardiovascular: Negative for chest pain and claudication.   Respiratory: Negative for cough and shortness of breath.   Musculoskeletal: Positive for foot pain.  Negative for muscle cramps and muscle weakness.   Gastrointestinal: Negative for nausea and vomiting.   Neurological: Negative for numbness and paresthesias.   Dermatological: Negative for wound.        Objective:       Vitals:    09/25/19 1537   BP: 137/60   Weight: 52.6 kg (116 lb)   Height: 4' 10" (1.473 m)   PainSc: 0-No pain       Physical Exam  Constitutional:   Patient is oriented to person, place, and time. Vital signs are normal. Appears well-developed and well-nourished.     Vascular:   Dorsalis pedis pulses are 1+ on the right side, and 1+ on the left side.   Posterior tibial pulses are 1+ on the right side, and 1+ on the left side.   - " digital hair growth, capillary fill time to all toes =4 seconds, toes are cool to touch  + swelling feet and ankles    Skin/Dermatological:   Skin is thin, warm, shiny and atrophic. No cyanosis or clubbing. No rashes noted. No open wounds.   Right 1, 2, 3, 4, 5 left 1, 2, 3, 4, 5 toenails yellow discolored, thickened 3 mm, elongated 3 mm with subungual debris and tenderness.  Corns distal third toes. Lateral 5th toes. Medial great toes    Musculoskeletal:   Mild bunions, hammertoes and bunionettes observed.  Decreased range of motion of bilateral midtarsal, subtalar joints, ankle joint dorsiflexion is restricted bilaterally. Muscle strength to tibialis anterior, extensor hallucis longus, extensor digitorum longus, peroneal muscles, flexor hallucis/digotorum longus, posterior tibial and gastrosoleal complex is 5/5.    Neurological:   Negative deficits to sharp/dull, light touch or vibratory sensation bilateral feet           Assessment:       1. Onychomycosis due to dermatophyte    2. Peripheral vascular disease, unspecified    3. Corn or callus        Plan:       Onychomycosis due to dermatophyte  -     Routine Foot Care    Peripheral vascular disease, unspecified  -     Routine Foot Care    [place] or callus  -     Routine Foot Care          Shoe inspection. Foot Education. Patient instructed on proper foot hygeine. We discussed wearing proper shoe gear, daily foot inspections, never walking without protective shoe gear, never putting sharp instruments to feet.  We also discussed padding and shoes with high toe boxes for  foot deformities.    - See routine foot care procedure note for nail debridement/ callus trimming.     F/u 9 weeks sooner Roger Newman DPM

## 2019-10-25 NOTE — TELEPHONE ENCOUNTER
I spoke with Marisol, daughter, and she stated she would bring the patient on Friday.   
[place]: I performed a face to face bedside interview with patient regarding history of present illness, review of symptoms and past medical history. I completed an independent physical exam and ordered tests/medications as needed.  I have discussed patient's plan of care with advanced care provider. The advanced care provider assisted in  executing the discussed plan. I was available for any questions or issues that may have arose during the execution of the plan of care.

## 2019-12-23 ENCOUNTER — OFFICE VISIT (OUTPATIENT)
Dept: PODIATRY | Facility: CLINIC | Age: 84
End: 2019-12-23
Payer: MEDICARE

## 2019-12-23 VITALS
SYSTOLIC BLOOD PRESSURE: 128 MMHG | WEIGHT: 115.94 LBS | HEIGHT: 58 IN | BODY MASS INDEX: 24.34 KG/M2 | DIASTOLIC BLOOD PRESSURE: 78 MMHG

## 2019-12-23 DIAGNOSIS — B35.1 ONYCHOMYCOSIS DUE TO DERMATOPHYTE: Primary | ICD-10-CM

## 2019-12-23 DIAGNOSIS — I73.9 PERIPHERAL VASCULAR DISEASE, UNSPECIFIED: ICD-10-CM

## 2019-12-23 PROCEDURE — 99499 NO LOS: ICD-10-PCS | Mod: HCNC,S$GLB,, | Performed by: PODIATRIST

## 2019-12-23 PROCEDURE — 11721 PR DEBRIDEMENT OF NAILS, 6 OR MORE: ICD-10-PCS | Mod: HCNC,59,Q9,S$GLB | Performed by: PODIATRIST

## 2019-12-23 PROCEDURE — 99499 UNLISTED E&M SERVICE: CPT | Mod: HCNC,S$GLB,, | Performed by: PODIATRIST

## 2019-12-23 PROCEDURE — 11056 PR TRIM BENIGN HYPERKERATOTIC SKIN LESION,2-4: ICD-10-PCS | Mod: HCNC,Q9,S$GLB, | Performed by: PODIATRIST

## 2019-12-23 PROCEDURE — 99999 PR PBB SHADOW E&M-EST. PATIENT-LVL III: CPT | Mod: PBBFAC,HCNC,, | Performed by: PODIATRIST

## 2019-12-23 PROCEDURE — 99999 PR PBB SHADOW E&M-EST. PATIENT-LVL III: ICD-10-PCS | Mod: PBBFAC,HCNC,, | Performed by: PODIATRIST

## 2019-12-23 PROCEDURE — 11721 DEBRIDE NAIL 6 OR MORE: CPT | Mod: HCNC,59,Q9,S$GLB | Performed by: PODIATRIST

## 2019-12-23 PROCEDURE — 11056 PARNG/CUTG B9 HYPRKR LES 2-4: CPT | Mod: HCNC,Q9,S$GLB, | Performed by: PODIATRIST

## 2019-12-23 NOTE — PATIENT INSTRUCTIONS
Kerasal for fungal nails apply daily to all toenails.  Can be found at [facility]

## 2019-12-30 NOTE — PROGRESS NOTES
"Subjective:       Patient ID: Nany Crenshaw is a 89 y.o. female.    Chief Complaint: Nail Care (ov 6/11/19 Dr Mcdaniel)    Foot Pain       Nany is a 89 y.o. female who presents to the clinic for evaluation and treatment of high risk feet. Nany has a past medical history of Colon polyp, Coronary artery disease, Diverticulosis, Fracture of right distal radius, GERD (gastroesophageal reflux disease), Hypertension, Hypothyroidism (acquired), Osteoporosis, post-menopausal, and Scoliosis. The patient's chief complaint is long, thick toenails. This patient has documented high risk feet requiring routine maintenance secondary to peripheral vascular disease.        PCP: Ivette Mcdaniel MD    Date Last Seen by PCP:   Chief Complaint   Patient presents with    Nail Care     ov 6/11/19 Dr Mcdaniel       Current shoe gear:  Affected Foot: Casual shoes     Unaffected Foot: Casual shoes    Hemoglobin A1C   Date Value Ref Range Status   01/20/2005 5.6 4.5 - 6.2 % Final     Review of Systems  ROS:  Constitution: Negative for chills, fever, weakness and malaise/fatigue.   HEENT: Negative for headaches.   Cardiovascular: Negative for chest pain and claudication.   Respiratory: Negative for cough and shortness of breath.   Musculoskeletal: Positive for foot pain.  Negative for muscle cramps and muscle weakness.   Gastrointestinal: Negative for nausea and vomiting.   Neurological: Negative for numbness and paresthesias.   Dermatological: Negative for wound.        Objective:       Vitals:    12/23/19 1125   BP: 128/78   Weight: 52.6 kg (115 lb 15.4 oz)   Height: 4' 10" (1.473 m)   PainSc: 0-No pain       Physical Exam  Constitutional:   Patient is oriented to person, place, and time. Vital signs are normal. Appears well-developed and well-nourished.     Vascular:   Dorsalis pedis pulses are 1+ on the right side, and 1+ on the left side.   Posterior tibial pulses are 1+ on the right side, and 1+ on the left side.   - digital hair " growth, capillary fill time to all toes =4 seconds, toes are cool to touch  + swelling feet and ankles    Skin/Dermatological:   Skin is thin, warm, shiny and atrophic. No cyanosis or clubbing. No rashes noted. No open wounds.   Right 1, 2, 3, 4, 5 left 1, 2, 3, 4, 5 toenails yellow discolored, thickened 3 mm, elongated 3 mm with subungual debris and tenderness.  Corns distal third toes. Lateral 5th toes. Medial great toes    Musculoskeletal:   Mild bunions, hammertoes and bunionettes observed.  Decreased range of motion of bilateral midtarsal, subtalar joints, ankle joint dorsiflexion is restricted bilaterally. Muscle strength to tibialis anterior, extensor hallucis longus, extensor digitorum longus, peroneal muscles, flexor hallucis/digotorum longus, posterior tibial and gastrosoleal complex is 5/5.    Neurological:   Negative deficits to sharp/dull, light touch or vibratory sensation bilateral feet           Assessment:       1. Onychomycosis due to dermatophyte    2. Peripheral vascular disease, unspecified        Plan:       Onychomycosis due to dermatophyte    Peripheral vascular disease, unspecified          Shoe inspection. Foot Education. Patient instructed on proper foot hygeine. We discussed wearing proper shoe gear, daily foot inspections, never walking without protective shoe gear, never putting sharp instruments to feet.  We also discussed padding and shoes with high toe boxes for  foot deformities.    - With patient's permission, nails were aggressively reduced and debrided x 10 to their soft tissue attachment mechanically and with electric , removing all offending nail and debris. Patient relates relief following the procedure. He will continue to monitor the areas daily, inspect his feet, wear protective shoe gear when ambulatory, moisturizer to maintain skin integrity and follow in this office in approximately 2-3 months, sooner p.r.n.   - After cleansing the area w/ alcohol prep pad the  above mentioned hyperkeratosis was trimmed utilizing No 15 scapel, to a smooth base with out incident.       F/u 9 weeks sooner Proc SALENA Newman DPM

## 2020-01-06 ENCOUNTER — OFFICE VISIT (OUTPATIENT)
Dept: CARDIOLOGY | Facility: CLINIC | Age: 85
End: 2020-01-06
Payer: MEDICARE

## 2020-01-06 VITALS
BODY MASS INDEX: 24.54 KG/M2 | WEIGHT: 116.88 LBS | HEIGHT: 58 IN | OXYGEN SATURATION: 95 % | DIASTOLIC BLOOD PRESSURE: 67 MMHG | HEART RATE: 67 BPM | SYSTOLIC BLOOD PRESSURE: 152 MMHG

## 2020-01-06 DIAGNOSIS — I82.421 DEEP VEIN THROMBOSIS (DVT) OF ILIAC VEIN OF RIGHT LOWER EXTREMITY, UNSPECIFIED CHRONICITY: ICD-10-CM

## 2020-01-06 DIAGNOSIS — I10 HYPERTENSION: ICD-10-CM

## 2020-01-06 DIAGNOSIS — I10 BENIGN HYPERTENSION: Primary | ICD-10-CM

## 2020-01-06 DIAGNOSIS — R42 DIZZINESS: ICD-10-CM

## 2020-01-06 DIAGNOSIS — E78.00 PURE HYPERCHOLESTEROLEMIA: ICD-10-CM

## 2020-01-06 DIAGNOSIS — I25.10 CORONARY ARTERY DISEASE INVOLVING NATIVE CORONARY ARTERY OF NATIVE HEART WITHOUT ANGINA PECTORIS: ICD-10-CM

## 2020-01-06 PROCEDURE — 1126F AMNT PAIN NOTED NONE PRSNT: CPT | Mod: HCNC,S$GLB,, | Performed by: INTERNAL MEDICINE

## 2020-01-06 PROCEDURE — 99499 UNLISTED E&M SERVICE: CPT | Mod: HCNC,S$GLB,, | Performed by: INTERNAL MEDICINE

## 2020-01-06 PROCEDURE — 99499 RISK ADDL DX/OHS AUDIT: ICD-10-PCS | Mod: HCNC,S$GLB,, | Performed by: INTERNAL MEDICINE

## 2020-01-06 PROCEDURE — 99214 PR OFFICE/OUTPT VISIT, EST, LEVL IV, 30-39 MIN: ICD-10-PCS | Mod: HCNC,S$GLB,, | Performed by: INTERNAL MEDICINE

## 2020-01-06 PROCEDURE — 99999 PR PBB SHADOW E&M-EST. PATIENT-LVL III: ICD-10-PCS | Mod: PBBFAC,HCNC,, | Performed by: INTERNAL MEDICINE

## 2020-01-06 PROCEDURE — 99214 OFFICE O/P EST MOD 30 MIN: CPT | Mod: HCNC,S$GLB,, | Performed by: INTERNAL MEDICINE

## 2020-01-06 PROCEDURE — 1159F PR MEDICATION LIST DOCUMENTED IN MEDICAL RECORD: ICD-10-PCS | Mod: HCNC,S$GLB,, | Performed by: INTERNAL MEDICINE

## 2020-01-06 PROCEDURE — 93000 ELECTROCARDIOGRAM COMPLETE: CPT | Mod: HCNC,S$GLB,, | Performed by: INTERNAL MEDICINE

## 2020-01-06 PROCEDURE — 1126F PR PAIN SEVERITY QUANTIFIED, NO PAIN PRESENT: ICD-10-PCS | Mod: HCNC,S$GLB,, | Performed by: INTERNAL MEDICINE

## 2020-01-06 PROCEDURE — 1101F PT FALLS ASSESS-DOCD LE1/YR: CPT | Mod: HCNC,CPTII,S$GLB, | Performed by: INTERNAL MEDICINE

## 2020-01-06 PROCEDURE — 1101F PR PT FALLS ASSESS DOC 0-1 FALLS W/OUT INJ PAST YR: ICD-10-PCS | Mod: HCNC,CPTII,S$GLB, | Performed by: INTERNAL MEDICINE

## 2020-01-06 PROCEDURE — 93000 EKG 12-LEAD: ICD-10-PCS | Mod: HCNC,S$GLB,, | Performed by: INTERNAL MEDICINE

## 2020-01-06 PROCEDURE — 99999 PR PBB SHADOW E&M-EST. PATIENT-LVL III: CPT | Mod: PBBFAC,HCNC,, | Performed by: INTERNAL MEDICINE

## 2020-01-06 PROCEDURE — 1159F MED LIST DOCD IN RCRD: CPT | Mod: HCNC,S$GLB,, | Performed by: INTERNAL MEDICINE

## 2020-01-06 NOTE — PROGRESS NOTES
Subjective:    Patient ID:  Nany Crenshaw is a 89 y.o. female who presents for follow-up of Coronary Artery Disease      HPI     Recurrent RLE DVT - on eliquis, HTN, HLD, CAD, LBBB    Previously followed by Dr Noe  Here for follow-up of DVT and coronary artery disease.  She's had some inflammation and prominence of her varicosities in the right leg.  She's mainly worried about recurrence of clot of blood thinners.  She denies any worsening cardiopulmonary complaints.  She's not had any chest pain, shortness of breath or palpitations.  She denies any PND, orthopnea or lower edema.  She is not expressing dizziness, presyncope or syncope.     Here for follow-up of DVT and coronary artery disease.  She still has some prominence in varicosities in her legs.  She says at times it gets red around her right ankle but does not cause her pain.  She tolerated her blood thinner as had no problems.  She recently went to [facility] [place] without any issues on the airplane.  Do a trip to [place] planned as well.  She denies any PND, orthopnea or lower extremity edema otherwise.  She's not expressing dizziness, presyncope or syncope.    -cont med tx  -cont to follow sx mainly  -Diet and exercise tolerated  -On Eliquis for OAC indefinitely for recurrent DVT       lower extremity ultrasound:   11-17  Nonocclusive thrombus in the right femoral vein and right popliteal vein.     Echo 11/9/17  CONCLUSIONS     1 - Normal left ventricular systolic function (EF 55-60%).     2 - Mild aortic regurgitation.      LDL- 78   5-18  CBC and CMP within normal limits apart from mildly elevated T bili     Carotid ultrasound:  5-18  CONCLUSIONS   There is 20 - 39% right Internal Carotid stenosis.  There is 20 - 39% left Internal Carotid stenosis.     Lower extremity arterial ultrasound:  Impression:  1-  no hemodynamically significant plaque bilaterally     Lower extremity venous ultrasound:  TEST DESCRIPTION   Impression:    RIGHT:  Chronic occlusive DVT  of the Femoral Vein.    LEFT:  No evidence of Left lower extremity DVT.      1/6/20 Daughter provided translation  Denies CP or SOB  Walks with a walker at home  EKG NSR LBBB - no change    Review of Systems   Constitution: Negative for decreased appetite.   HENT: Negative for ear discharge.    Eyes: Negative for blurred vision.   Respiratory: Negative for hemoptysis.    Endocrine: Negative for polyphagia.   Hematologic/Lymphatic: Negative for adenopathy.   Skin: Negative for color change.   Musculoskeletal: Negative for joint swelling.   Genitourinary: Negative for bladder incontinence.   Neurological: Negative for brief paralysis.   Psychiatric/Behavioral: Negative for hallucinations.   Allergic/Immunologic: Negative for hives.        Objective:    Physical Exam   Constitutional: She is oriented to person, place, and time. She appears well-developed and well-nourished.   HENT:   Head: Normocephalic and atraumatic.   Eyes: Pupils are equal, round, and reactive to light. Conjunctivae and EOM are normal.   Neck: Normal range of motion. Neck supple. No thyromegaly present.   Cardiovascular: Normal rate and regular rhythm.   No murmur heard.  Pulmonary/Chest: Effort normal and breath sounds normal. No respiratory distress.   Abdominal: Soft. Bowel sounds are normal.   Musculoskeletal: Normal range of motion. She exhibits no edema.   Prominent varicosities with some inflammation right greater than left   Neurological: She is alert and oriented to person, place, and time.   Skin: Skin is warm and dry.   Psychiatric: She has a normal mood and affect. Her behavior is normal.         Assessment:       1. Benign hypertension    2. Coronary artery disease involving native coronary artery of native heart without angina pectoris    3. Pure hypercholesterolemia    4. Deep vein thrombosis (DVT) of iliac vein of right lower extremity, unspecified chronicity    5. Dizziness         Plan:       Cardiac stable  OV 1 year

## 2020-03-05 RX ORDER — SIMVASTATIN 40 MG/1
TABLET, FILM COATED ORAL
Qty: 90 TABLET | Refills: 1 | Status: SHIPPED | OUTPATIENT
Start: 2020-03-05 | End: 2021-01-06

## 2020-04-16 RX ORDER — LEVOTHYROXINE SODIUM 50 UG/1
TABLET ORAL
Qty: 90 TABLET | Refills: 1 | Status: SHIPPED | OUTPATIENT
Start: 2020-04-16 | End: 2020-08-31

## 2020-05-11 ENCOUNTER — PATIENT MESSAGE (OUTPATIENT)
Dept: INTERNAL MEDICINE | Facility: CLINIC | Age: 85
End: 2020-05-11

## 2020-07-07 ENCOUNTER — OFFICE VISIT (OUTPATIENT)
Dept: INTERNAL MEDICINE | Facility: CLINIC | Age: 85
End: 2020-07-07
Payer: MEDICARE

## 2020-07-07 DIAGNOSIS — B37.89 CANDIDIASIS OF BREAST: Primary | ICD-10-CM

## 2020-07-07 PROCEDURE — 1159F MED LIST DOCD IN RCRD: CPT | Mod: HCNC,95,, | Performed by: INTERNAL MEDICINE

## 2020-07-07 PROCEDURE — 99213 OFFICE O/P EST LOW 20 MIN: CPT | Mod: HCNC,95,, | Performed by: INTERNAL MEDICINE

## 2020-07-07 PROCEDURE — 1159F PR MEDICATION LIST DOCUMENTED IN MEDICAL RECORD: ICD-10-PCS | Mod: HCNC,95,, | Performed by: INTERNAL MEDICINE

## 2020-07-07 PROCEDURE — 1101F PT FALLS ASSESS-DOCD LE1/YR: CPT | Mod: HCNC,CPTII,95, | Performed by: INTERNAL MEDICINE

## 2020-07-07 PROCEDURE — 99213 PR OFFICE/OUTPT VISIT, EST, LEVL III, 20-29 MIN: ICD-10-PCS | Mod: HCNC,95,, | Performed by: INTERNAL MEDICINE

## 2020-07-07 PROCEDURE — 1101F PR PT FALLS ASSESS DOC 0-1 FALLS W/OUT INJ PAST YR: ICD-10-PCS | Mod: HCNC,CPTII,95, | Performed by: INTERNAL MEDICINE

## 2020-07-07 RX ORDER — NYSTATIN 100000 U/G
CREAM TOPICAL 2 TIMES DAILY
Qty: 30 G | Refills: 0 | Status: SHIPPED | OUTPATIENT
Start: 2020-07-07 | End: 2020-07-10 | Stop reason: SDUPTHER

## 2020-07-07 NOTE — PROGRESS NOTES
Subjective:       Patient ID: Nany Crenshaw is a 89 y.o. female.    Chief Complaint: Rash    Patient is being seen via Virtual Visit.     HPI    Patient reports over the last few days redness and itching of the skin under breast bilaterally.  Once before but resolved.  Using over-the-counter Neosporin with some relief.    Review of Systems   Constitutional: Negative for activity change and unexpected weight change.   HENT: Negative for hearing loss, rhinorrhea and trouble swallowing.    Eyes: Negative for discharge and visual disturbance.   Respiratory: Negative for chest tightness and wheezing.    Cardiovascular: Negative for chest pain and palpitations.   Gastrointestinal: Negative for blood in stool, constipation, diarrhea and vomiting.   Endocrine: Negative for polydipsia and polyuria.   Genitourinary: Negative for difficulty urinating, dysuria, hematuria and menstrual problem.   Musculoskeletal: Negative for arthralgias, joint swelling and neck pain.   Neurological: Positive for weakness. Negative for headaches.   Psychiatric/Behavioral: Positive for dysphoric mood. Negative for confusion.   All other systems reviewed and are negative.      Objective:      Physical Exam  Vitals signs and nursing note reviewed.   Constitutional:       General: She is not in acute distress.     Appearance: She is not diaphoretic.   Pulmonary:      Effort: Pulmonary effort is normal. No respiratory distress.   Skin:     General: Skin is warm and dry.      Findings: Rash (candidal rash under breasts b/L) present.   Neurological:      Mental Status: She is alert.   Psychiatric:         Behavior: Behavior normal.           Assessment:       1. Candidiasis of breast        Plan:   Nany was seen today for rash.    Diagnoses and all orders for this visit:    Candidiasis of breast:  New problem, start Nystatin two times a day until resolved. If no improvement in 3 days please notify the office.  -     nystatin (MYCOSTATIN) cream; Apply  topically 2 (two) times daily.      Total time spent with patient: 15 minutes    Keep regular follow up appointments with Ivette Mcdaniel MD.   Servando Geronimo MD  Internal Medicine    Portions of this note were completed using medical dictation software. Please excuse typographical or syntax errors that were missed on review.      The patient location is: home ([place])  Visit type: Virtual visit with synchronous audio and video  Each patient to whom he or she provides medical services by telemedicine is:  (1) informed of the relationship between the physician and patient and the respective role of any other health care provider with respect to management of the patient; and (2) notified that he or she may decline to receive medical services by telemedicine and may withdraw from such care at any time.

## 2020-07-15 ENCOUNTER — TELEPHONE (OUTPATIENT)
Dept: INTERNAL MEDICINE | Facility: CLINIC | Age: 85
End: 2020-07-15

## 2020-07-15 ENCOUNTER — OFFICE VISIT (OUTPATIENT)
Dept: INTERNAL MEDICINE | Facility: CLINIC | Age: 85
End: 2020-07-15
Payer: MEDICARE

## 2020-07-15 ENCOUNTER — LAB VISIT (OUTPATIENT)
Dept: LAB | Facility: HOSPITAL | Age: 85
End: 2020-07-15
Attending: INTERNAL MEDICINE
Payer: MEDICARE

## 2020-07-15 VITALS
HEIGHT: 58 IN | SYSTOLIC BLOOD PRESSURE: 138 MMHG | OXYGEN SATURATION: 97 % | BODY MASS INDEX: 24.57 KG/M2 | WEIGHT: 117.06 LBS | DIASTOLIC BLOOD PRESSURE: 68 MMHG | HEART RATE: 67 BPM

## 2020-07-15 DIAGNOSIS — E03.9 HYPOTHYROIDISM (ACQUIRED): ICD-10-CM

## 2020-07-15 DIAGNOSIS — R21 RASH AND NONSPECIFIC SKIN ERUPTION: ICD-10-CM

## 2020-07-15 DIAGNOSIS — Z79.01 CHRONIC ANTICOAGULATION: ICD-10-CM

## 2020-07-15 DIAGNOSIS — I10 BENIGN HYPERTENSION: ICD-10-CM

## 2020-07-15 DIAGNOSIS — E78.5 HYPERLIPIDEMIA, UNSPECIFIED HYPERLIPIDEMIA TYPE: ICD-10-CM

## 2020-07-15 DIAGNOSIS — E78.5 HYPERLIPIDEMIA, UNSPECIFIED HYPERLIPIDEMIA TYPE: Primary | ICD-10-CM

## 2020-07-15 DIAGNOSIS — I82.421 DEEP VEIN THROMBOSIS (DVT) OF ILIAC VEIN OF RIGHT LOWER EXTREMITY, UNSPECIFIED CHRONICITY: ICD-10-CM

## 2020-07-15 LAB
ALBUMIN SERPL BCP-MCNC: 3.8 G/DL (ref 3.5–5.2)
ALP SERPL-CCNC: 62 U/L (ref 55–135)
ALT SERPL W/O P-5'-P-CCNC: 20 U/L (ref 10–44)
ANION GAP SERPL CALC-SCNC: 6 MMOL/L (ref 8–16)
AST SERPL-CCNC: 25 U/L (ref 10–40)
BASOPHILS # BLD AUTO: 0.04 K/UL (ref 0–0.2)
BASOPHILS NFR BLD: 0.6 % (ref 0–1.9)
BILIRUB DIRECT SERPL-MCNC: 0.6 MG/DL (ref 0.1–0.3)
BILIRUB SERPL-MCNC: 1.3 MG/DL (ref 0.1–1)
BUN SERPL-MCNC: 14 MG/DL (ref 10–30)
CALCIUM SERPL-MCNC: 9.3 MG/DL (ref 8.7–10.5)
CHLORIDE SERPL-SCNC: 103 MMOL/L (ref 95–110)
CHOLEST SERPL-MCNC: 192 MG/DL (ref 120–199)
CHOLEST/HDLC SERPL: 2.1 {RATIO} (ref 2–5)
CO2 SERPL-SCNC: 29 MMOL/L (ref 23–29)
CREAT SERPL-MCNC: 0.8 MG/DL (ref 0.5–1.4)
DIFFERENTIAL METHOD: ABNORMAL
EOSINOPHIL # BLD AUTO: 0 K/UL (ref 0–0.5)
EOSINOPHIL NFR BLD: 0.5 % (ref 0–8)
ERYTHROCYTE [DISTWIDTH] IN BLOOD BY AUTOMATED COUNT: 12.8 % (ref 11.5–14.5)
EST. GFR  (AFRICAN AMERICAN): >60 ML/MIN/1.73 M^2
EST. GFR  (NON AFRICAN AMERICAN): >60 ML/MIN/1.73 M^2
GLUCOSE SERPL-MCNC: 99 MG/DL (ref 70–110)
HCT VFR BLD AUTO: 41.4 % (ref 37–48.5)
HDLC SERPL-MCNC: 93 MG/DL (ref 40–75)
HDLC SERPL: 48.4 % (ref 20–50)
HGB BLD-MCNC: 13.2 G/DL (ref 12–16)
IMM GRANULOCYTES # BLD AUTO: 0.02 K/UL (ref 0–0.04)
IMM GRANULOCYTES NFR BLD AUTO: 0.3 % (ref 0–0.5)
LDLC SERPL CALC-MCNC: 79.6 MG/DL (ref 63–159)
LYMPHOCYTES # BLD AUTO: 1.2 K/UL (ref 1–4.8)
LYMPHOCYTES NFR BLD: 17.9 % (ref 18–48)
MCH RBC QN AUTO: 29.9 PG (ref 27–31)
MCHC RBC AUTO-ENTMCNC: 31.9 G/DL (ref 32–36)
MCV RBC AUTO: 94 FL (ref 82–98)
MONOCYTES # BLD AUTO: 0.5 K/UL (ref 0.3–1)
MONOCYTES NFR BLD: 6.8 % (ref 4–15)
NEUTROPHILS # BLD AUTO: 4.9 K/UL (ref 1.8–7.7)
NEUTROPHILS NFR BLD: 73.9 % (ref 38–73)
NONHDLC SERPL-MCNC: 99 MG/DL
NRBC BLD-RTO: 0 /100 WBC
PLATELET # BLD AUTO: 224 K/UL (ref 150–350)
PMV BLD AUTO: 9.6 FL (ref 9.2–12.9)
POTASSIUM SERPL-SCNC: 4.9 MMOL/L (ref 3.5–5.1)
PROT SERPL-MCNC: 6.5 G/DL (ref 6–8.4)
RBC # BLD AUTO: 4.42 M/UL (ref 4–5.4)
SODIUM SERPL-SCNC: 138 MMOL/L (ref 136–145)
TRIGL SERPL-MCNC: 97 MG/DL (ref 30–150)
TSH SERPL DL<=0.005 MIU/L-ACNC: 0.98 UIU/ML (ref 0.4–4)
WBC # BLD AUTO: 6.66 K/UL (ref 3.9–12.7)

## 2020-07-15 PROCEDURE — 1159F PR MEDICATION LIST DOCUMENTED IN MEDICAL RECORD: ICD-10-PCS | Mod: HCNC,S$GLB,, | Performed by: INTERNAL MEDICINE

## 2020-07-15 PROCEDURE — 99214 OFFICE O/P EST MOD 30 MIN: CPT | Mod: HCNC,S$GLB,, | Performed by: INTERNAL MEDICINE

## 2020-07-15 PROCEDURE — 99999 PR PBB SHADOW E&M-EST. PATIENT-LVL IV: CPT | Mod: PBBFAC,HCNC,, | Performed by: INTERNAL MEDICINE

## 2020-07-15 PROCEDURE — 85025 COMPLETE CBC W/AUTO DIFF WBC: CPT | Mod: HCNC

## 2020-07-15 PROCEDURE — 80061 LIPID PANEL: CPT | Mod: HCNC

## 2020-07-15 PROCEDURE — 99214 PR OFFICE/OUTPT VISIT, EST, LEVL IV, 30-39 MIN: ICD-10-PCS | Mod: HCNC,S$GLB,, | Performed by: INTERNAL MEDICINE

## 2020-07-15 PROCEDURE — 80048 BASIC METABOLIC PNL TOTAL CA: CPT | Mod: HCNC

## 2020-07-15 PROCEDURE — 99999 PR PBB SHADOW E&M-EST. PATIENT-LVL IV: ICD-10-PCS | Mod: PBBFAC,HCNC,, | Performed by: INTERNAL MEDICINE

## 2020-07-15 PROCEDURE — 1159F MED LIST DOCD IN RCRD: CPT | Mod: HCNC,S$GLB,, | Performed by: INTERNAL MEDICINE

## 2020-07-15 PROCEDURE — 99499 RISK ADDL DX/OHS AUDIT: ICD-10-PCS | Mod: HCNC,S$GLB,, | Performed by: INTERNAL MEDICINE

## 2020-07-15 PROCEDURE — 1101F PT FALLS ASSESS-DOCD LE1/YR: CPT | Mod: HCNC,CPTII,S$GLB, | Performed by: INTERNAL MEDICINE

## 2020-07-15 PROCEDURE — 84443 ASSAY THYROID STIM HORMONE: CPT | Mod: HCNC

## 2020-07-15 PROCEDURE — 80076 HEPATIC FUNCTION PANEL: CPT | Mod: HCNC

## 2020-07-15 PROCEDURE — 99499 UNLISTED E&M SERVICE: CPT | Mod: HCNC,S$GLB,, | Performed by: INTERNAL MEDICINE

## 2020-07-15 PROCEDURE — 1101F PR PT FALLS ASSESS DOC 0-1 FALLS W/OUT INJ PAST YR: ICD-10-PCS | Mod: HCNC,CPTII,S$GLB, | Performed by: INTERNAL MEDICINE

## 2020-07-15 PROCEDURE — 36415 COLL VENOUS BLD VENIPUNCTURE: CPT | Mod: HCNC

## 2020-07-15 RX ORDER — KETOCONAZOLE 20 MG/G
CREAM TOPICAL DAILY
Qty: 60 G | Refills: 3 | Status: SHIPPED | OUTPATIENT
Start: 2020-07-15 | End: 2021-04-04 | Stop reason: CLARIF

## 2020-07-15 RX ORDER — MUPIROCIN 20 MG/G
OINTMENT TOPICAL 3 TIMES DAILY
Qty: 30 G | Refills: 2 | Status: SHIPPED | OUTPATIENT
Start: 2020-07-15 | End: 2021-04-04 | Stop reason: CLARIF

## 2020-07-15 NOTE — TELEPHONE ENCOUNTER
----- Message from Nataly Giang sent at 7/15/2020  7:37 AM CDT -----  Regarding: Pt request via [facility]  Message    Appointment Request From: Nany Crenshaw    With Provider: Ivette Mcdaniel MD [[facility] - Internal Medicine]    Preferred Date Range: 7/15/2020 - 7/17/2020    Preferred Times: Wednesday Morning, Thursday Morning, Friday Morning    Reason for visit: The fungus is spreading    Comments:  Can you order a blood test to see if she has a blood infection? The fungus is spreading to different places. Prefer early a.m.

## 2020-07-15 NOTE — PROGRESS NOTES
"Subjective:       Patient ID: Nany Crenshaw is a 89 y.o. female.    Chief Complaint: Follow-up   This is a 89-year-old who presents today for follow-up she is brought in by her daughter reports she has been doing fairly well she gets around okay at home and does have a walker when she needs for long distances.  She has history of previous DVT with recurrence and remains on medication she is living alone with her granddaughter next door and has had no difficulties no falls.  Daughter reports that she started with a rash some itching abdomen and her back the area reports does seem to be getting a little bit better over time but is doing some peeling and kind of red she had been putting tape and keeping it covered after using nystatin cream she reports that did seem to help a bit but it just does not seem to be resolving she has also had some itching to the area .  She would also like to get her blood work done while she is here medications have been stable she has had no fevers and otherwise not feeling badly.    HPI  Review of Systems   Skin:        Rash itching   Some peeling        Objective:     Blood pressure 138/68, pulse 67, height 4' 10" (1.473 m), weight 53.1 kg (117 lb 1 oz), SpO2 97 %.    Physical Exam  Constitutional:       General: She is not in acute distress.  HENT:      Head: Normocephalic.   Eyes:      General: No scleral icterus.  Neck:      Musculoskeletal: Neck supple.   Cardiovascular:      Rate and Rhythm: Normal rate and regular rhythm.      Heart sounds: Normal heart sounds. No murmur. No friction rub. No gallop.       Comments: Breast : normal no masses or tenderness   Pulmonary:      Effort: Pulmonary effort is normal. No respiratory distress.      Breath sounds: Normal breath sounds.   Abdominal:      General: Bowel sounds are normal.      Palpations: Abdomen is soft. There is no mass.      Tenderness: There is no abdominal tenderness.   Musculoskeletal:      Comments: Trace edema   Varicose " veins    Skin:     Findings: No erythema.      Comments: Dermatitis   Fungal red area abdomen  Left back area of peeling erythema  Blanching   With some yellowing around it   No discomfort or warmth no pain to palpation    Neurological:      Mental Status: She is alert.         Assessment:       1. Hyperlipidemia, unspecified hyperlipidemia type    2. Benign hypertension    3. Rash and nonspecific skin eruption    4. Hypothyroidism (acquired)    5. Deep vein thrombosis (DVT) of iliac vein of right lower extremity, unspecified chronicity    6. Chronic anticoagulation        Plan:       Nany was seen today for follow-up.    Diagnoses and all orders for this visit:    Hyperlipidemia, unspecified hyperlipidemia type  History of she remains on simvastatin and zetia   -     Basic metabolic panel; Future  -     CBC auto differential; Future  -     Hepatic function panel; Future  -     Lipid Panel; Future  -     TSH; Future    Benign hypertension  Blood pressure acceptable continue current regimen  -     Basic metabolic panel; Future  -     CBC auto differential; Future  -     Hepatic function panel; Future  -     Lipid Panel; Future  -     TSH; Future    Rash and nonspecific skin eruption  Discussed with daughter and patient will have her stop her Lotrimin for now trial of especially to the area on the front of the abdomen that looks more fungal in nature on the back she can use that as well and avoid scratching the area we discussed moisturize but also would place her on some Bactroban to area since she has some crusting around it to treat possible impetigo to area as well   Avoid scratching area and discussed the Dermatology appointment for further evaluation try to avoid compressing the area consistently and call if no improvement or increased concerns  -     Ambulatory referral/consult to Dermatology; Future  -     ketoconazole (NIZORAL) 2 % cream; Apply topically once daily.    Hypothyroidism (acquired)  Continue  current regimen she has been taking her medicines    Deep vein thrombosis (DVT) of iliac vein of right lower extremity, unspecified chronicity  Chronic anticoagulation  History of she remains on anticoagulation and tolerates without difficulty      -     mupirocin (BACTROBAN) 2 % ointment; Apply topically 3 (three) times daily.

## 2020-09-30 ENCOUNTER — OFFICE VISIT (OUTPATIENT)
Dept: INTERNAL MEDICINE | Facility: CLINIC | Age: 85
End: 2020-09-30
Payer: MEDICARE

## 2020-09-30 ENCOUNTER — PATIENT MESSAGE (OUTPATIENT)
Dept: INTERNAL MEDICINE | Facility: CLINIC | Age: 85
End: 2020-09-30

## 2020-09-30 DIAGNOSIS — B02.9 HERPES ZOSTER WITHOUT COMPLICATION: ICD-10-CM

## 2020-09-30 DIAGNOSIS — L98.9 SKIN LESION OF LEFT LEG: ICD-10-CM

## 2020-09-30 DIAGNOSIS — L98.9 SKIN LESION OF RIGHT LEG: Primary | ICD-10-CM

## 2020-09-30 PROCEDURE — 1101F PT FALLS ASSESS-DOCD LE1/YR: CPT | Mod: HCNC,CPTII,95, | Performed by: INTERNAL MEDICINE

## 2020-09-30 PROCEDURE — 99213 PR OFFICE/OUTPT VISIT, EST, LEVL III, 20-29 MIN: ICD-10-PCS | Mod: HCNC,95,, | Performed by: INTERNAL MEDICINE

## 2020-09-30 PROCEDURE — 1159F MED LIST DOCD IN RCRD: CPT | Mod: HCNC,95,, | Performed by: INTERNAL MEDICINE

## 2020-09-30 PROCEDURE — 1101F PR PT FALLS ASSESS DOC 0-1 FALLS W/OUT INJ PAST YR: ICD-10-PCS | Mod: HCNC,CPTII,95, | Performed by: INTERNAL MEDICINE

## 2020-09-30 PROCEDURE — 1159F PR MEDICATION LIST DOCUMENTED IN MEDICAL RECORD: ICD-10-PCS | Mod: HCNC,95,, | Performed by: INTERNAL MEDICINE

## 2020-09-30 PROCEDURE — 99213 OFFICE O/P EST LOW 20 MIN: CPT | Mod: HCNC,95,, | Performed by: INTERNAL MEDICINE

## 2020-09-30 RX ORDER — ACYCLOVIR 50 MG/G
OINTMENT TOPICAL
Qty: 1 TUBE | Refills: 1 | Status: SHIPPED | OUTPATIENT
Start: 2020-09-30 | End: 2021-04-04 | Stop reason: CLARIF

## 2020-09-30 NOTE — PROGRESS NOTES
Subjective:      Patient ID: Nany Crenshaw is a 89 y.o. female.    Chief Complaint: Skin Problem    HPI:  Rash  This is a new problem. The current episode started today. The affected locations include the abdomen and left upper leg. The rash is characterized by redness. She was exposed to nothing. Past treatments include anti-itch cream. The treatment provided no relief. Her past medical history is significant for varicella. There is no history of allergies, asthma or eczema.      The patient location is: home  The chief complaint leading to consultation is: New skin lesion    Visit type: audiovisual    Face to Face time with patient: 10   minutes of total time spent on the encounter, which includes face to face time and non-face to face time preparing to see the patient (eg, review of tests), Obtaining and/or reviewing separately obtained history, Documenting clinical information in the electronic or other health record, Independently interpreting results (not separately reported) and communicating results to the patient/family/caregiver, or Care coordination (not separately reported).         Each patient to whom he or she provides medical services by telemedicine is:  (1) informed of the relationship between the physician and patient and the respective role of any other health care provider with respect to management of the patient; and (2) notified that he or she may decline to receive medical services by telemedicine and may withdraw from such care at any time.    Notes:   The patients daughter is present to interpret.  The patient's daughter tells me that her mother has a person who comes in to bathe her every Wednesday.  On the back of the right leg she noted a patch of approximately 6 dots with 5 of them surrounding a central dot.  The patient was asked if this was painful and she said no she feels nothing.  I asked the daughter to carefully look at the buttocks and the rest of the leg and she found no other  areas.  She did find a slight redness or dot on the left leg and under the left pannus.  She is uncertain whether her mother had ever gotten the shingles vaccine.    Patient Active Problem List   Diagnosis    Ptosis of both eyelids - Both Eyes    Benign hypertension    GERD (gastroesophageal reflux disease)    Hypothyroidism (acquired)    Coronary artery disease    Ptosis, bilateral    Osteoporosis, post-menopausal    Varicose veins    Pure hypercholesterolemia    Other and unspecified hyperlipidemia    Peripheral vascular disease, unspecified    Dizziness    Gait disturbance    Impaired mobility and ADLs    Adjustment reaction with anxiety and depression    Right leg DVT    Dyslipidemia    Hyponatremia    Vomiting    Abdominal cyst    Refractive error    Blepharitis of upper and lower eyelids of both eyes     Past Medical History:   Diagnosis Date    Colon polyp     no further colon needed    Coronary artery disease     Diverticulosis     Fracture of right distal radius     GERD (gastroesophageal reflux disease)     Hypertension     Hypothyroidism (acquired)     Osteoporosis, post-menopausal     Scoliosis      Past Surgical History:   Procedure Laterality Date    ADENOIDECTOMY      CATARACT EXTRACTION      ou     SECTION      CHOLECYSTECTOMY      EYE SURGERY  2014    ptosis surgery      HYSTERECTOMY      yag cap      od     Family History   Problem Relation Age of Onset    Cancer Mother         skin cancer     Dementia Mother     Hypertension Mother     Heart disease Brother     Cancer Brother         lung cancer     No Known Problems Father     No Known Problems Sister     No Known Problems Maternal Aunt     No Known Problems Maternal Uncle     No Known Problems Paternal Aunt     No Known Problems Paternal Uncle     No Known Problems Maternal Grandmother     No Known Problems Maternal Grandfather     No Known Problems Paternal Grandmother     No Known  Problems Paternal Grandfather     Amblyopia Neg Hx     Blindness Neg Hx     Cataracts Neg Hx     Diabetes Neg Hx     Glaucoma Neg Hx     Macular degeneration Neg Hx     Retinal detachment Neg Hx     Strabismus Neg Hx     Stroke Neg Hx     Thyroid disease Neg Hx     Melanoma Neg Hx      Review of Systems   Skin: Positive for rash.   No other symptoms  Objective:   There were no vitals filed for this visit.  There is no height or weight on file to calculate BMI.  Physical Exam     It was difficult to see the lesions on the camera but you could distinctly tell that this was a patch of lesions.  Assessment:     1. Skin lesion of right leg    2. Skin lesion of left leg    3. Herpes zoster without complication      Plan:   Nany was seen today for skin problem.    Diagnoses and all orders for this visit:    Skin lesion of right leg    Skin lesion of left leg    Herpes zoster without complication  -     acyclovir 5% (ZOVIRAX) 5 % ointment; Apply topically 5 (five) times daily.     I discussed with the patient's daughter that as long as her mother was completely asymptomatic I believe that we could use a Zovirax ointment to the area.  I would like her to look daily to make sure there is no extension of these lesions or changes.  My best thought is this would be a patch of shingles.    Problem List Items Addressed This Visit     None      Visit Diagnoses     Skin lesion of right leg    -  Primary    Skin lesion of left leg        Herpes zoster without complication        Relevant Medications    acyclovir 5% (ZOVIRAX) 5 % ointment        No orders of the defined types were placed in this encounter.    No follow-ups on file.     Medication List          Accurate as of September 30, 2020  1:58 PM. If you have any questions, ask your nurse or doctor.            START taking these medications    acyclovir 5% 5 % ointment  Commonly known as: ZOVIRAX  Apply topically 5 (five) times daily.        CONTINUE taking these  medications    ciclopirox 8 % Soln  Commonly known as: PENLAC  Apply topically nightly.     diclofenac sodium 1 % Gel  Commonly known as: VOLTAREN  Apply 2 g topically 4 (four) times daily.     ELIQUIS 5 mg Tab  Generic drug: apixaban  TAKE 1 TABLET(5 MG) BY MOUTH TWICE DAILY     ezetimibe 10 mg tablet  Commonly known as: ZETIA  TAKE 1 TABLET(10 MG) BY MOUTH EVERY DAY     fluorouraciL 5 % cream  Commonly known as: EFUDEX  Apply to affected area on face twice daily x 2 weeks     ketoconazole 2 % cream  Commonly known as: NIZORAL  Apply topically once daily.     levothyroxine 50 MCG tablet  Commonly known as: SYNTHROID  TAKE 1 TABLET BY MOUTH DAILY     lisinopriL 10 MG tablet  TAKE 1 AND 1/2 TABLETS BY MOUTH DAILY     metoprolol tartrate 25 MG tablet  Commonly known as: LOPRESSOR  TAKE 1 TABLET BY MOUTH TWICE DAILY     mupirocin 2 % ointment  Commonly known as: BACTROBAN  Apply topically 3 (three) times daily.     nystatin cream  Commonly known as: MYCOSTATIN  Apply topically 2 (two) times daily as needed for Dry Skin.     simvastatin 40 MG tablet  Commonly known as: ZOCOR  TAKE 1 TABLET(40 MG) BY MOUTH EVERY EVENING           Where to Get Your Medications      These medications were sent to [id number] - [place]  [address] AT [facility] & [place] [address]    Phone: [phone]   · acyclovir 5% 5 % ointment

## 2020-10-16 ENCOUNTER — PATIENT MESSAGE (OUTPATIENT)
Dept: CARDIOLOGY | Facility: CLINIC | Age: 85
End: 2020-10-16

## 2020-11-04 ENCOUNTER — PATIENT OUTREACH (OUTPATIENT)
Dept: ADMINISTRATIVE | Facility: OTHER | Age: 85
End: 2020-11-04

## 2020-12-15 ENCOUNTER — PATIENT MESSAGE (OUTPATIENT)
Dept: INTERNAL MEDICINE | Facility: CLINIC | Age: 85
End: 2020-12-15

## 2020-12-15 RX ORDER — EZETIMIBE 10 MG/1
10 TABLET ORAL DAILY
Qty: 90 TABLET | Refills: 3 | Status: SHIPPED | OUTPATIENT
Start: 2020-12-15 | End: 2021-09-26 | Stop reason: SDUPTHER

## 2020-12-15 RX ORDER — LISINOPRIL 10 MG/1
15 TABLET ORAL DAILY
Qty: 135 TABLET | Refills: 3 | Status: SHIPPED | OUTPATIENT
Start: 2020-12-15 | End: 2021-09-26 | Stop reason: SDUPTHER

## 2020-12-15 NOTE — TELEPHONE ENCOUNTER
----- Message from Loren Benitez sent at 12/15/2020  2:25 PM CST -----  Contact: Marisol/ kim/[phone]  Requesting an RX refill or new RX.  Is this a refill or new RX: refill  RX name and strength: lisinopriL 10 MG tablet 135 tablet   Is this a 30 day or 90 day RX:   Pharmacy name and phone # (copy/paste from chart):  [facility] #[id number] - [place] - [address] AT [address] [phone] (Phone)  [phone] (Fax)  Comments: 2 nd request    Requesting an RX refill or new RX.  Is this a refill or new RX: refill  RX name and strength: ezetimibe (ZETIA) 10 mg tablet 90 tablet   Is this a 30 day or 90 day RX:   Pharmacy name and phone # (copy/paste from chart):  [facility] #[id number] - [place], [place] - [address] [phone] (Phone)  [phone] (Fax)    Comments: 2 nd request

## 2021-01-01 ENCOUNTER — PATIENT MESSAGE (OUTPATIENT)
Dept: INTERNAL MEDICINE | Facility: CLINIC | Age: 86
End: 2021-01-01

## 2021-01-01 NOTE — PROGRESS NOTES
Discharge orders received. Patient AAO x 4. Denies pain, nausea, or SOB on RA. IV discontinued without complaint, catheter intact. Telemetry monitoring discontinued. Discharge instructions explained and given to patient and daughter Marisol (primary caretaker). All questions and concerns addressed. Emotional support provided.     PCT transported patient to daughter's vehicle via wheelchair. No falls or harm noted.   
Patient tolerated regular diet. She ate approximately 50%. She denies nausea, vomiting, or abdominal pain.    
Resident

## 2021-01-22 ENCOUNTER — IMMUNIZATION (OUTPATIENT)
Dept: PHARMACY | Facility: CLINIC | Age: 86
End: 2021-01-22

## 2021-01-22 DIAGNOSIS — Z23 NEED FOR VACCINATION: Primary | ICD-10-CM

## 2021-02-12 ENCOUNTER — PES CALL (OUTPATIENT)
Dept: ADMINISTRATIVE | Facility: CLINIC | Age: 86
End: 2021-02-12

## 2021-02-19 ENCOUNTER — IMMUNIZATION (OUTPATIENT)
Dept: PHARMACY | Facility: CLINIC | Age: 86
End: 2021-02-19

## 2021-02-19 DIAGNOSIS — Z23 NEED FOR VACCINATION: Primary | ICD-10-CM

## 2021-04-04 ENCOUNTER — HOSPITAL ENCOUNTER (EMERGENCY)
Facility: HOSPITAL | Age: 86
Discharge: HOME | End: 2021-04-04
Attending: EMERGENCY MEDICINE
Payer: MEDICARE

## 2021-04-04 VITALS
SYSTOLIC BLOOD PRESSURE: 147 MMHG | HEART RATE: 62 BPM | HEIGHT: 58 IN | DIASTOLIC BLOOD PRESSURE: 65 MMHG | OXYGEN SATURATION: 96 % | BODY MASS INDEX: 24.35 KG/M2 | RESPIRATION RATE: 18 BRPM | WEIGHT: 116 LBS | TEMPERATURE: 98 F

## 2021-04-04 DIAGNOSIS — S29.8XXA BLUNT TRAUMA TO CHEST, INITIAL ENCOUNTER: Primary | ICD-10-CM
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
## 2021-04-04 PROCEDURE — 99283 EMERGENCY DEPT VISIT LOW MDM: CPT | Mod: 25

## 2021-04-04 RX ORDER — HYDROCODONE BITARTRATE AND ACETAMINOPHEN 5; 325 MG/1; MG/1
1 TABLET ORAL EVERY 4 HOURS PRN
Qty: 15 TABLET | Refills: 0 | Status: SHIPPED | OUTPATIENT
Start: 2021-04-04 | End: 2021-04-14

## 2021-05-14 ENCOUNTER — OFFICE VISIT (OUTPATIENT)
Dept: CARDIOLOGY | Facility: CLINIC | Age: 86
End: 2021-05-14
Payer: MEDICARE

## 2021-05-14 VITALS
DIASTOLIC BLOOD PRESSURE: 68 MMHG | SYSTOLIC BLOOD PRESSURE: 130 MMHG | BODY MASS INDEX: 23.88 KG/M2 | HEART RATE: 66 BPM | HEIGHT: 58 IN | OXYGEN SATURATION: 95 % | WEIGHT: 113.75 LBS

## 2021-05-14 DIAGNOSIS — R42 DIZZINESS: Primary | ICD-10-CM

## 2021-05-14 DIAGNOSIS — I25.10 CORONARY ARTERY DISEASE INVOLVING NATIVE CORONARY ARTERY OF NATIVE HEART WITHOUT ANGINA PECTORIS: ICD-10-CM

## 2021-05-14 DIAGNOSIS — E78.5 DYSLIPIDEMIA: ICD-10-CM

## 2021-05-14 DIAGNOSIS — I73.9 PERIPHERAL VASCULAR DISEASE, UNSPECIFIED: ICD-10-CM

## 2021-05-14 DIAGNOSIS — R55 NEAR SYNCOPE: ICD-10-CM

## 2021-05-14 PROCEDURE — 1126F PR PAIN SEVERITY QUANTIFIED, NO PAIN PRESENT: ICD-10-PCS | Mod: S$GLB,,, | Performed by: INTERNAL MEDICINE

## 2021-05-14 PROCEDURE — 1159F MED LIST DOCD IN RCRD: CPT | Mod: S$GLB,,, | Performed by: INTERNAL MEDICINE

## 2021-05-14 PROCEDURE — 3288F PR FALLS RISK ASSESSMENT DOCUMENTED: ICD-10-PCS | Mod: CPTII,S$GLB,, | Performed by: INTERNAL MEDICINE

## 2021-05-14 PROCEDURE — 1101F PR PT FALLS ASSESS DOC 0-1 FALLS W/OUT INJ PAST YR: ICD-10-PCS | Mod: CPTII,S$GLB,, | Performed by: INTERNAL MEDICINE

## 2021-05-14 PROCEDURE — 1101F PT FALLS ASSESS-DOCD LE1/YR: CPT | Mod: CPTII,S$GLB,, | Performed by: INTERNAL MEDICINE

## 2021-05-14 PROCEDURE — 99214 OFFICE O/P EST MOD 30 MIN: CPT | Mod: 25,S$GLB,, | Performed by: INTERNAL MEDICINE

## 2021-05-14 PROCEDURE — 93000 EKG 12-LEAD: ICD-10-PCS | Mod: S$GLB,,, | Performed by: INTERNAL MEDICINE

## 2021-05-14 PROCEDURE — 1126F AMNT PAIN NOTED NONE PRSNT: CPT | Mod: S$GLB,,, | Performed by: INTERNAL MEDICINE

## 2021-05-14 PROCEDURE — 1159F PR MEDICATION LIST DOCUMENTED IN MEDICAL RECORD: ICD-10-PCS | Mod: S$GLB,,, | Performed by: INTERNAL MEDICINE

## 2021-05-14 PROCEDURE — 99999 PR PBB SHADOW E&M-EST. PATIENT-LVL III: CPT | Mod: PBBFAC,,, | Performed by: INTERNAL MEDICINE

## 2021-05-14 PROCEDURE — 99999 PR PBB SHADOW E&M-EST. PATIENT-LVL III: ICD-10-PCS | Mod: PBBFAC,,, | Performed by: INTERNAL MEDICINE

## 2021-05-14 PROCEDURE — 99214 PR OFFICE/OUTPT VISIT, EST, LEVL IV, 30-39 MIN: ICD-10-PCS | Mod: 25,S$GLB,, | Performed by: INTERNAL MEDICINE

## 2021-05-14 PROCEDURE — 3288F FALL RISK ASSESSMENT DOCD: CPT | Mod: CPTII,S$GLB,, | Performed by: INTERNAL MEDICINE

## 2021-05-14 PROCEDURE — 93000 ELECTROCARDIOGRAM COMPLETE: CPT | Mod: S$GLB,,, | Performed by: INTERNAL MEDICINE

## 2021-05-17 ENCOUNTER — TELEPHONE (OUTPATIENT)
Dept: INTERNAL MEDICINE | Facility: CLINIC | Age: 86
End: 2021-05-17

## 2021-05-17 ENCOUNTER — PATIENT MESSAGE (OUTPATIENT)
Dept: CARDIOLOGY | Facility: CLINIC | Age: 86
End: 2021-05-17

## 2021-05-17 ENCOUNTER — OFFICE VISIT (OUTPATIENT)
Dept: PODIATRY | Facility: CLINIC | Age: 86
End: 2021-05-17
Payer: MEDICARE

## 2021-05-17 ENCOUNTER — OFFICE VISIT (OUTPATIENT)
Dept: INTERNAL MEDICINE | Facility: CLINIC | Age: 86
End: 2021-05-17
Payer: MEDICARE

## 2021-05-17 ENCOUNTER — LAB VISIT (OUTPATIENT)
Dept: LAB | Facility: HOSPITAL | Age: 86
End: 2021-05-17
Attending: INTERNAL MEDICINE
Payer: MEDICARE

## 2021-05-17 VITALS
OXYGEN SATURATION: 96 % | HEIGHT: 56 IN | DIASTOLIC BLOOD PRESSURE: 60 MMHG | WEIGHT: 112 LBS | BODY MASS INDEX: 25.19 KG/M2 | HEART RATE: 71 BPM | SYSTOLIC BLOOD PRESSURE: 128 MMHG

## 2021-05-17 VITALS — WEIGHT: 112 LBS | BODY MASS INDEX: 25.19 KG/M2 | HEIGHT: 56 IN

## 2021-05-17 DIAGNOSIS — B35.1 ONYCHOMYCOSIS DUE TO DERMATOPHYTE: ICD-10-CM

## 2021-05-17 DIAGNOSIS — Z00.00 ANNUAL PHYSICAL EXAM: ICD-10-CM

## 2021-05-17 DIAGNOSIS — E03.9 HYPOTHYROIDISM, UNSPECIFIED TYPE: ICD-10-CM

## 2021-05-17 DIAGNOSIS — E78.5 HYPERLIPIDEMIA, UNSPECIFIED HYPERLIPIDEMIA TYPE: ICD-10-CM

## 2021-05-17 DIAGNOSIS — Z00.00 ANNUAL PHYSICAL EXAM: Primary | ICD-10-CM

## 2021-05-17 DIAGNOSIS — R42 DIZZINESS: ICD-10-CM

## 2021-05-17 DIAGNOSIS — I10 ESSENTIAL HYPERTENSION: ICD-10-CM

## 2021-05-17 DIAGNOSIS — Z79.01 CHRONIC ANTICOAGULATION: ICD-10-CM

## 2021-05-17 DIAGNOSIS — I82.499 DEEP VEIN THROMBOSIS (DVT) OF OTHER VEIN OF LOWER EXTREMITY, UNSPECIFIED CHRONICITY, UNSPECIFIED LATERALITY: ICD-10-CM

## 2021-05-17 DIAGNOSIS — I73.9 PERIPHERAL VASCULAR DISEASE, UNSPECIFIED: Primary | ICD-10-CM

## 2021-05-17 LAB
BASOPHILS # BLD AUTO: 0.03 K/UL (ref 0–0.2)
BASOPHILS NFR BLD: 0.6 % (ref 0–1.9)
DIFFERENTIAL METHOD: ABNORMAL
EOSINOPHIL # BLD AUTO: 0 K/UL (ref 0–0.5)
EOSINOPHIL NFR BLD: 0.2 % (ref 0–8)
ERYTHROCYTE [DISTWIDTH] IN BLOOD BY AUTOMATED COUNT: 13.3 % (ref 11.5–14.5)
HCT VFR BLD AUTO: 39.3 % (ref 37–48.5)
HGB BLD-MCNC: 13 G/DL (ref 12–16)
IMM GRANULOCYTES # BLD AUTO: 0.03 K/UL (ref 0–0.04)
IMM GRANULOCYTES NFR BLD AUTO: 0.6 % (ref 0–0.5)
LYMPHOCYTES # BLD AUTO: 0.9 K/UL (ref 1–4.8)
LYMPHOCYTES NFR BLD: 16.5 % (ref 18–48)
MCH RBC QN AUTO: 30.3 PG (ref 27–31)
MCHC RBC AUTO-ENTMCNC: 33.1 G/DL (ref 32–36)
MCV RBC AUTO: 92 FL (ref 82–98)
MONOCYTES # BLD AUTO: 0.3 K/UL (ref 0.3–1)
MONOCYTES NFR BLD: 6 % (ref 4–15)
NEUTROPHILS # BLD AUTO: 3.9 K/UL (ref 1.8–7.7)
NEUTROPHILS NFR BLD: 76.1 % (ref 38–73)
NRBC BLD-RTO: 0 /100 WBC
PLATELET # BLD AUTO: 216 K/UL (ref 150–450)
PMV BLD AUTO: 9.3 FL (ref 9.2–12.9)
RBC # BLD AUTO: 4.29 M/UL (ref 4–5.4)
WBC # BLD AUTO: 5.16 K/UL (ref 3.9–12.7)

## 2021-05-17 PROCEDURE — 80061 LIPID PANEL: CPT | Performed by: INTERNAL MEDICINE

## 2021-05-17 PROCEDURE — 3288F FALL RISK ASSESSMENT DOCD: CPT | Mod: CPTII,S$GLB,, | Performed by: PODIATRIST

## 2021-05-17 PROCEDURE — 99397 PER PM REEVAL EST PAT 65+ YR: CPT | Mod: S$GLB,,, | Performed by: INTERNAL MEDICINE

## 2021-05-17 PROCEDURE — 84443 ASSAY THYROID STIM HORMONE: CPT | Performed by: INTERNAL MEDICINE

## 2021-05-17 PROCEDURE — 99999 PR PBB SHADOW E&M-EST. PATIENT-LVL IV: ICD-10-PCS | Mod: PBBFAC,,, | Performed by: INTERNAL MEDICINE

## 2021-05-17 PROCEDURE — 1126F AMNT PAIN NOTED NONE PRSNT: CPT | Mod: S$GLB,,, | Performed by: INTERNAL MEDICINE

## 2021-05-17 PROCEDURE — 11056 PARNG/CUTG B9 HYPRKR LES 2-4: CPT | Mod: Q9,S$GLB,, | Performed by: PODIATRIST

## 2021-05-17 PROCEDURE — 3288F PR FALLS RISK ASSESSMENT DOCUMENTED: ICD-10-PCS | Mod: CPTII,S$GLB,, | Performed by: PODIATRIST

## 2021-05-17 PROCEDURE — 3288F FALL RISK ASSESSMENT DOCD: CPT | Mod: CPTII,S$GLB,, | Performed by: INTERNAL MEDICINE

## 2021-05-17 PROCEDURE — 99999 PR PBB SHADOW E&M-EST. PATIENT-LVL IV: CPT | Mod: PBBFAC,,, | Performed by: INTERNAL MEDICINE

## 2021-05-17 PROCEDURE — 1126F AMNT PAIN NOTED NONE PRSNT: CPT | Mod: S$GLB,,, | Performed by: PODIATRIST

## 2021-05-17 PROCEDURE — 80076 HEPATIC FUNCTION PANEL: CPT | Performed by: INTERNAL MEDICINE

## 2021-05-17 PROCEDURE — 99499 NO LOS: ICD-10-PCS | Mod: S$GLB,,, | Performed by: PODIATRIST

## 2021-05-17 PROCEDURE — 99999 PR PBB SHADOW E&M-EST. PATIENT-LVL III: CPT | Mod: PBBFAC,,, | Performed by: PODIATRIST

## 2021-05-17 PROCEDURE — 1101F PR PT FALLS ASSESS DOC 0-1 FALLS W/OUT INJ PAST YR: ICD-10-PCS | Mod: CPTII,S$GLB,, | Performed by: INTERNAL MEDICINE

## 2021-05-17 PROCEDURE — 99999 PR PBB SHADOW E&M-EST. PATIENT-LVL III: ICD-10-PCS | Mod: PBBFAC,,, | Performed by: PODIATRIST

## 2021-05-17 PROCEDURE — 1101F PT FALLS ASSESS-DOCD LE1/YR: CPT | Mod: CPTII,S$GLB,, | Performed by: INTERNAL MEDICINE

## 2021-05-17 PROCEDURE — 80048 BASIC METABOLIC PNL TOTAL CA: CPT | Performed by: INTERNAL MEDICINE

## 2021-05-17 PROCEDURE — 11721 DEBRIDE NAIL 6 OR MORE: CPT | Mod: Q9,59,S$GLB, | Performed by: PODIATRIST

## 2021-05-17 PROCEDURE — 3288F PR FALLS RISK ASSESSMENT DOCUMENTED: ICD-10-PCS | Mod: CPTII,S$GLB,, | Performed by: INTERNAL MEDICINE

## 2021-05-17 PROCEDURE — 1126F PR PAIN SEVERITY QUANTIFIED, NO PAIN PRESENT: ICD-10-PCS | Mod: S$GLB,,, | Performed by: INTERNAL MEDICINE

## 2021-05-17 PROCEDURE — 36415 COLL VENOUS BLD VENIPUNCTURE: CPT | Performed by: INTERNAL MEDICINE

## 2021-05-17 PROCEDURE — 11056 PR TRIM BENIGN HYPERKERATOTIC SKIN LESION,2-4: ICD-10-PCS | Mod: Q9,S$GLB,, | Performed by: PODIATRIST

## 2021-05-17 PROCEDURE — 84439 ASSAY OF FREE THYROXINE: CPT | Performed by: INTERNAL MEDICINE

## 2021-05-17 PROCEDURE — 11721 PR DEBRIDEMENT OF NAILS, 6 OR MORE: ICD-10-PCS | Mod: Q9,59,S$GLB, | Performed by: PODIATRIST

## 2021-05-17 PROCEDURE — 99397 PR PREVENTIVE VISIT,EST,65 & OVER: ICD-10-PCS | Mod: S$GLB,,, | Performed by: INTERNAL MEDICINE

## 2021-05-17 PROCEDURE — 85025 COMPLETE CBC W/AUTO DIFF WBC: CPT | Performed by: INTERNAL MEDICINE

## 2021-05-17 PROCEDURE — 1101F PT FALLS ASSESS-DOCD LE1/YR: CPT | Mod: CPTII,S$GLB,, | Performed by: PODIATRIST

## 2021-05-17 PROCEDURE — 1126F PR PAIN SEVERITY QUANTIFIED, NO PAIN PRESENT: ICD-10-PCS | Mod: S$GLB,,, | Performed by: PODIATRIST

## 2021-05-17 PROCEDURE — 1101F PR PT FALLS ASSESS DOC 0-1 FALLS W/OUT INJ PAST YR: ICD-10-PCS | Mod: CPTII,S$GLB,, | Performed by: PODIATRIST

## 2021-05-17 PROCEDURE — 99499 UNLISTED E&M SERVICE: CPT | Mod: S$GLB,,, | Performed by: PODIATRIST

## 2021-05-18 ENCOUNTER — CLINICAL SUPPORT (OUTPATIENT)
Dept: CARDIOLOGY | Facility: HOSPITAL | Age: 86
End: 2021-05-18
Attending: INTERNAL MEDICINE
Payer: MEDICARE

## 2021-05-18 DIAGNOSIS — R42 DIZZINESS: ICD-10-CM

## 2021-05-18 DIAGNOSIS — R55 NEAR SYNCOPE: ICD-10-CM

## 2021-05-18 DIAGNOSIS — I73.9 PERIPHERAL VASCULAR DISEASE, UNSPECIFIED: ICD-10-CM

## 2021-05-18 DIAGNOSIS — E78.5 DYSLIPIDEMIA: ICD-10-CM

## 2021-05-18 DIAGNOSIS — I25.10 CORONARY ARTERY DISEASE INVOLVING NATIVE CORONARY ARTERY OF NATIVE HEART WITHOUT ANGINA PECTORIS: ICD-10-CM

## 2021-05-18 DIAGNOSIS — E03.9 HYPOTHYROIDISM, UNSPECIFIED TYPE: Primary | ICD-10-CM

## 2021-05-18 LAB
ALBUMIN SERPL BCP-MCNC: 3.7 G/DL (ref 3.5–5.2)
ALP SERPL-CCNC: 70 U/L (ref 55–135)
ALT SERPL W/O P-5'-P-CCNC: 27 U/L (ref 10–44)
ANION GAP SERPL CALC-SCNC: 12 MMOL/L (ref 8–16)
AST SERPL-CCNC: 30 U/L (ref 10–40)
BILIRUB DIRECT SERPL-MCNC: 0.5 MG/DL (ref 0.1–0.3)
BILIRUB SERPL-MCNC: 1.3 MG/DL (ref 0.1–1)
BUN SERPL-MCNC: 12 MG/DL (ref 10–30)
CALCIUM SERPL-MCNC: 9.6 MG/DL (ref 8.7–10.5)
CHLORIDE SERPL-SCNC: 100 MMOL/L (ref 95–110)
CHOLEST SERPL-MCNC: 190 MG/DL (ref 120–199)
CHOLEST/HDLC SERPL: 2.1 {RATIO} (ref 2–5)
CO2 SERPL-SCNC: 21 MMOL/L (ref 23–29)
CREAT SERPL-MCNC: 0.6 MG/DL (ref 0.5–1.4)
EST. GFR  (AFRICAN AMERICAN): >60 ML/MIN/1.73 M^2
EST. GFR  (NON AFRICAN AMERICAN): >60 ML/MIN/1.73 M^2
GLUCOSE SERPL-MCNC: 75 MG/DL (ref 70–110)
HDLC SERPL-MCNC: 90 MG/DL (ref 40–75)
HDLC SERPL: 47.4 % (ref 20–50)
LDLC SERPL CALC-MCNC: 86.8 MG/DL (ref 63–159)
NONHDLC SERPL-MCNC: 100 MG/DL
POTASSIUM SERPL-SCNC: 4.3 MMOL/L (ref 3.5–5.1)
PROT SERPL-MCNC: 6.4 G/DL (ref 6–8.4)
SODIUM SERPL-SCNC: 133 MMOL/L (ref 136–145)
T4 FREE SERPL-MCNC: 1.18 NG/DL (ref 0.71–1.51)
TRIGL SERPL-MCNC: 66 MG/DL (ref 30–150)
TSH SERPL DL<=0.005 MIU/L-ACNC: 0.22 UIU/ML (ref 0.4–4)

## 2021-05-18 PROCEDURE — 93272 CARDIAC EVENT MONITOR (CUPID ONLY): ICD-10-PCS | Mod: ,,, | Performed by: INTERNAL MEDICINE

## 2021-05-18 PROCEDURE — 93272 ECG/REVIEW INTERPRET ONLY: CPT | Mod: ,,, | Performed by: INTERNAL MEDICINE

## 2021-05-18 PROCEDURE — 93271 ECG/MONITORING AND ANALYSIS: CPT

## 2021-05-22 ENCOUNTER — PATIENT MESSAGE (OUTPATIENT)
Dept: CARDIOLOGY | Facility: CLINIC | Age: 86
End: 2021-05-22

## 2021-05-24 ENCOUNTER — HOSPITAL ENCOUNTER (OUTPATIENT)
Dept: CARDIOLOGY | Facility: HOSPITAL | Age: 86
Discharge: HOME | End: 2021-05-24
Attending: INTERNAL MEDICINE
Payer: MEDICARE

## 2021-05-24 DIAGNOSIS — I73.9 PERIPHERAL VASCULAR DISEASE, UNSPECIFIED: ICD-10-CM

## 2021-05-24 DIAGNOSIS — I25.10 CORONARY ARTERY DISEASE INVOLVING NATIVE CORONARY ARTERY OF NATIVE HEART WITHOUT ANGINA PECTORIS: ICD-10-CM

## 2021-05-24 DIAGNOSIS — E78.5 DYSLIPIDEMIA: ICD-10-CM

## 2021-05-24 DIAGNOSIS — R55 NEAR SYNCOPE: ICD-10-CM

## 2021-05-24 DIAGNOSIS — R42 DIZZINESS: ICD-10-CM

## 2021-05-24 LAB
ASCENDING AORTA: 3.01 CM
AV INDEX (PROSTH): 0.68
AV MEAN GRADIENT: 7 MMHG
AV PEAK GRADIENT: 13 MMHG
AV VALVE AREA: 1.96 CM2
AV VELOCITY RATIO: 0.64
CV ECHO LV RWT: 0.55 CM
DOP CALC AO PEAK VEL: 1.77 M/S
DOP CALC AO VTI: 40.37 CM
DOP CALC LVOT AREA: 2.9 CM2
DOP CALC LVOT DIAMETER: 1.91 CM
DOP CALC LVOT PEAK VEL: 1.13 M/S
DOP CALC LVOT STROKE VOLUME: 79.04 CM3
DOP CALCLVOT PEAK VEL VTI: 27.6 CM
E WAVE DECELERATION TIME: 267.85 MSEC
E/A RATIO: 0.61
ECHO LV POSTERIOR WALL: 0.9 CM (ref 0.6–1.1)
EJECTION FRACTION: 65 %
FRACTIONAL SHORTENING: 53 % (ref 28–44)
INTERVENTRICULAR SEPTUM: 1.08 CM (ref 0.6–1.1)
IVRT: 96.89 MSEC
LA MAJOR: 4.4 CM
LA MINOR: 3.48 CM
LA WIDTH: 3.22 CM
LEFT ATRIUM SIZE: 3.03 CM
LEFT ATRIUM VOLUME: 32.23 CM3
LEFT INTERNAL DIMENSION IN SYSTOLE: 1.54 CM (ref 2.1–4)
LEFT VENTRICLE DIASTOLIC VOLUME: 43.57 ML
LEFT VENTRICLE SYSTOLIC VOLUME: 6.46 ML
LEFT VENTRICULAR INTERNAL DIMENSION IN DIASTOLE: 3.28 CM (ref 3.5–6)
LEFT VENTRICULAR MASS: 92.32 G
LV LATERAL E/E' RATIO: 26.33 M/S
MV A" WAVE DURATION": 9.23 MSEC
MV PEAK A VEL: 1.3 M/S
MV PEAK E VEL: 0.79 M/S
MV STENOSIS PRESSURE HALF TIME: 94.27 MS
MV VALVE AREA P 1/2 METHOD: 2.33 CM2
PISA TR MAX VEL: 2.5 M/S
PULM VEIN S/D RATIO: 1.7
PV PEAK D VEL: 0.53 M/S
PV PEAK S VEL: 0.9 M/S
PV PEAK VELOCITY: 1.05 CM/S
RA MAJOR: 4.26 CM
RA PRESSURE: 8 MMHG
RA WIDTH: 3.18 CM
RIGHT VENTRICULAR END-DIASTOLIC DIMENSION: 3.1 CM
RV TISSUE DOPPLER FREE WALL SYSTOLIC VELOCITY 1 (APICAL 4 CHAMBER VIEW): 7.2 CM/S
SINUS: 2.51 CM
STJ: 1.78 CM
TDI LATERAL: 0.03 M/S
TR MAX PG: 25 MMHG
TRICUSPID ANNULAR PLANE SYSTOLIC EXCURSION: 2.29 CM
TV REST PULMONARY ARTERY PRESSURE: 33 MMHG

## 2021-05-24 PROCEDURE — 93306 ECHO (CUPID ONLY): ICD-10-PCS | Mod: 26,,, | Performed by: INTERNAL MEDICINE

## 2021-05-24 PROCEDURE — 93306 TTE W/DOPPLER COMPLETE: CPT

## 2021-05-24 PROCEDURE — 93306 TTE W/DOPPLER COMPLETE: CPT | Mod: 26,,, | Performed by: INTERNAL MEDICINE

## 2021-06-21 ENCOUNTER — OFFICE VISIT (OUTPATIENT)
Dept: CARDIOLOGY | Facility: CLINIC | Age: 86
End: 2021-06-21
Payer: MEDICARE

## 2021-06-21 VITALS
HEART RATE: 66 BPM | OXYGEN SATURATION: 96 % | HEIGHT: 56 IN | DIASTOLIC BLOOD PRESSURE: 74 MMHG | WEIGHT: 112.13 LBS | BODY MASS INDEX: 25.22 KG/M2 | SYSTOLIC BLOOD PRESSURE: 150 MMHG

## 2021-06-21 DIAGNOSIS — E78.00 PURE HYPERCHOLESTEROLEMIA: ICD-10-CM

## 2021-06-21 DIAGNOSIS — R55 NEAR SYNCOPE: ICD-10-CM

## 2021-06-21 DIAGNOSIS — I10 BENIGN HYPERTENSION: Primary | ICD-10-CM

## 2021-06-21 DIAGNOSIS — I25.10 CORONARY ARTERY DISEASE INVOLVING NATIVE CORONARY ARTERY OF NATIVE HEART WITHOUT ANGINA PECTORIS: ICD-10-CM

## 2021-06-21 DIAGNOSIS — E78.5 DYSLIPIDEMIA: ICD-10-CM

## 2021-06-21 DIAGNOSIS — R42 DIZZINESS: ICD-10-CM

## 2021-06-21 PROCEDURE — 1126F PR PAIN SEVERITY QUANTIFIED, NO PAIN PRESENT: ICD-10-PCS | Mod: S$GLB,,, | Performed by: INTERNAL MEDICINE

## 2021-06-21 PROCEDURE — 99214 PR OFFICE/OUTPT VISIT, EST, LEVL IV, 30-39 MIN: ICD-10-PCS | Mod: S$GLB,,, | Performed by: INTERNAL MEDICINE

## 2021-06-21 PROCEDURE — 99999 PR PBB SHADOW E&M-EST. PATIENT-LVL III: CPT | Mod: PBBFAC,,, | Performed by: INTERNAL MEDICINE

## 2021-06-21 PROCEDURE — 1101F PT FALLS ASSESS-DOCD LE1/YR: CPT | Mod: CPTII,S$GLB,, | Performed by: INTERNAL MEDICINE

## 2021-06-21 PROCEDURE — 1159F MED LIST DOCD IN RCRD: CPT | Mod: S$GLB,,, | Performed by: INTERNAL MEDICINE

## 2021-06-21 PROCEDURE — 1101F PR PT FALLS ASSESS DOC 0-1 FALLS W/OUT INJ PAST YR: ICD-10-PCS | Mod: CPTII,S$GLB,, | Performed by: INTERNAL MEDICINE

## 2021-06-21 PROCEDURE — 99999 PR PBB SHADOW E&M-EST. PATIENT-LVL III: ICD-10-PCS | Mod: PBBFAC,,, | Performed by: INTERNAL MEDICINE

## 2021-06-21 PROCEDURE — 1126F AMNT PAIN NOTED NONE PRSNT: CPT | Mod: S$GLB,,, | Performed by: INTERNAL MEDICINE

## 2021-06-21 PROCEDURE — 3288F FALL RISK ASSESSMENT DOCD: CPT | Mod: CPTII,S$GLB,, | Performed by: INTERNAL MEDICINE

## 2021-06-21 PROCEDURE — 99214 OFFICE O/P EST MOD 30 MIN: CPT | Mod: S$GLB,,, | Performed by: INTERNAL MEDICINE

## 2021-06-21 PROCEDURE — 1159F PR MEDICATION LIST DOCUMENTED IN MEDICAL RECORD: ICD-10-PCS | Mod: S$GLB,,, | Performed by: INTERNAL MEDICINE

## 2021-06-21 PROCEDURE — 3288F PR FALLS RISK ASSESSMENT DOCUMENTED: ICD-10-PCS | Mod: CPTII,S$GLB,, | Performed by: INTERNAL MEDICINE

## 2021-09-27 ENCOUNTER — OFFICE VISIT (OUTPATIENT)
Dept: PODIATRY | Facility: CLINIC | Age: 86
End: 2021-09-27
Payer: MEDICARE

## 2021-09-27 VITALS — HEIGHT: 56 IN | BODY MASS INDEX: 25.19 KG/M2 | WEIGHT: 112 LBS

## 2021-09-27 DIAGNOSIS — I73.9 PERIPHERAL VASCULAR DISEASE, UNSPECIFIED: Primary | ICD-10-CM

## 2021-09-27 DIAGNOSIS — B35.1 ONYCHOMYCOSIS DUE TO DERMATOPHYTE: ICD-10-CM

## 2021-09-27 PROCEDURE — 11721 DEBRIDE NAIL 6 OR MORE: CPT | Mod: Q9,HCNC,S$GLB, | Performed by: PODIATRIST

## 2021-09-27 PROCEDURE — 1101F PT FALLS ASSESS-DOCD LE1/YR: CPT | Mod: HCNC,CPTII,S$GLB, | Performed by: PODIATRIST

## 2021-09-27 PROCEDURE — 3288F FALL RISK ASSESSMENT DOCD: CPT | Mod: HCNC,CPTII,S$GLB, | Performed by: PODIATRIST

## 2021-09-27 PROCEDURE — 99499 UNLISTED E&M SERVICE: CPT | Mod: HCNC,S$GLB,, | Performed by: PODIATRIST

## 2021-09-27 PROCEDURE — 1159F PR MEDICATION LIST DOCUMENTED IN MEDICAL RECORD: ICD-10-PCS | Mod: HCNC,CPTII,S$GLB, | Performed by: PODIATRIST

## 2021-09-27 PROCEDURE — 11721 PR DEBRIDEMENT OF NAILS, 6 OR MORE: ICD-10-PCS | Mod: Q9,HCNC,S$GLB, | Performed by: PODIATRIST

## 2021-09-27 PROCEDURE — 99999 PR PBB SHADOW E&M-EST. PATIENT-LVL III: ICD-10-PCS | Mod: PBBFAC,HCNC,, | Performed by: PODIATRIST

## 2021-09-27 PROCEDURE — 1101F PR PT FALLS ASSESS DOC 0-1 FALLS W/OUT INJ PAST YR: ICD-10-PCS | Mod: HCNC,CPTII,S$GLB, | Performed by: PODIATRIST

## 2021-09-27 PROCEDURE — 99999 PR PBB SHADOW E&M-EST. PATIENT-LVL III: CPT | Mod: PBBFAC,HCNC,, | Performed by: PODIATRIST

## 2021-09-27 PROCEDURE — 1125F PR PAIN SEVERITY QUANTIFIED, PAIN PRESENT: ICD-10-PCS | Mod: HCNC,CPTII,S$GLB, | Performed by: PODIATRIST

## 2021-09-27 PROCEDURE — 1159F MED LIST DOCD IN RCRD: CPT | Mod: HCNC,CPTII,S$GLB, | Performed by: PODIATRIST

## 2021-09-27 PROCEDURE — 1125F AMNT PAIN NOTED PAIN PRSNT: CPT | Mod: HCNC,CPTII,S$GLB, | Performed by: PODIATRIST

## 2021-09-27 PROCEDURE — 3288F PR FALLS RISK ASSESSMENT DOCUMENTED: ICD-10-PCS | Mod: HCNC,CPTII,S$GLB, | Performed by: PODIATRIST

## 2021-09-27 PROCEDURE — 99499 NO LOS: ICD-10-PCS | Mod: HCNC,S$GLB,, | Performed by: PODIATRIST

## 2021-09-27 RX ORDER — CICLOPIROX 80 MG/ML
SOLUTION TOPICAL NIGHTLY
Qty: 1 BOTTLE | Refills: 3 | Status: SHIPPED | OUTPATIENT
Start: 2021-09-27 | End: 2022-03-07

## 2021-10-01 ENCOUNTER — OFFICE VISIT (OUTPATIENT)
Dept: CARDIOLOGY | Facility: CLINIC | Age: 86
End: 2021-10-01
Payer: MEDICARE

## 2021-10-01 VITALS
DIASTOLIC BLOOD PRESSURE: 74 MMHG | BODY MASS INDEX: 24.38 KG/M2 | HEART RATE: 76 BPM | HEIGHT: 56 IN | SYSTOLIC BLOOD PRESSURE: 138 MMHG | WEIGHT: 108.38 LBS | OXYGEN SATURATION: 96 %

## 2021-10-01 DIAGNOSIS — I25.10 CORONARY ARTERY DISEASE INVOLVING NATIVE CORONARY ARTERY OF NATIVE HEART WITHOUT ANGINA PECTORIS: ICD-10-CM

## 2021-10-01 DIAGNOSIS — E78.00 PURE HYPERCHOLESTEROLEMIA: ICD-10-CM

## 2021-10-01 DIAGNOSIS — I10 BENIGN HYPERTENSION: Primary | ICD-10-CM

## 2021-10-01 DIAGNOSIS — R42 DIZZINESS: ICD-10-CM

## 2021-10-01 DIAGNOSIS — I73.9 PERIPHERAL VASCULAR DISEASE, UNSPECIFIED: ICD-10-CM

## 2021-10-01 PROCEDURE — 99499 RISK ADDL DX/OHS AUDIT: ICD-10-PCS | Mod: S$GLB,,, | Performed by: INTERNAL MEDICINE

## 2021-10-01 PROCEDURE — 99999 PR PBB SHADOW E&M-EST. PATIENT-LVL IV: ICD-10-PCS | Mod: PBBFAC,HCNC,, | Performed by: INTERNAL MEDICINE

## 2021-10-01 PROCEDURE — 1126F PR PAIN SEVERITY QUANTIFIED, NO PAIN PRESENT: ICD-10-PCS | Mod: HCNC,CPTII,S$GLB, | Performed by: INTERNAL MEDICINE

## 2021-10-01 PROCEDURE — 3288F FALL RISK ASSESSMENT DOCD: CPT | Mod: HCNC,CPTII,S$GLB, | Performed by: INTERNAL MEDICINE

## 2021-10-01 PROCEDURE — 1101F PR PT FALLS ASSESS DOC 0-1 FALLS W/OUT INJ PAST YR: ICD-10-PCS | Mod: HCNC,CPTII,S$GLB, | Performed by: INTERNAL MEDICINE

## 2021-10-01 PROCEDURE — 93000 EKG 12-LEAD: ICD-10-PCS | Mod: HCNC,S$GLB,, | Performed by: INTERNAL MEDICINE

## 2021-10-01 PROCEDURE — 99999 PR PBB SHADOW E&M-EST. PATIENT-LVL IV: CPT | Mod: PBBFAC,HCNC,, | Performed by: INTERNAL MEDICINE

## 2021-10-01 PROCEDURE — 1101F PT FALLS ASSESS-DOCD LE1/YR: CPT | Mod: HCNC,CPTII,S$GLB, | Performed by: INTERNAL MEDICINE

## 2021-10-01 PROCEDURE — 1159F PR MEDICATION LIST DOCUMENTED IN MEDICAL RECORD: ICD-10-PCS | Mod: HCNC,CPTII,S$GLB, | Performed by: INTERNAL MEDICINE

## 2021-10-01 PROCEDURE — 1159F MED LIST DOCD IN RCRD: CPT | Mod: HCNC,CPTII,S$GLB, | Performed by: INTERNAL MEDICINE

## 2021-10-01 PROCEDURE — 99499 UNLISTED E&M SERVICE: CPT | Mod: S$GLB,,, | Performed by: INTERNAL MEDICINE

## 2021-10-01 PROCEDURE — 3288F PR FALLS RISK ASSESSMENT DOCUMENTED: ICD-10-PCS | Mod: HCNC,CPTII,S$GLB, | Performed by: INTERNAL MEDICINE

## 2021-10-01 PROCEDURE — 99214 PR OFFICE/OUTPT VISIT, EST, LEVL IV, 30-39 MIN: ICD-10-PCS | Mod: HCNC,S$GLB,, | Performed by: INTERNAL MEDICINE

## 2021-10-01 PROCEDURE — 1126F AMNT PAIN NOTED NONE PRSNT: CPT | Mod: HCNC,CPTII,S$GLB, | Performed by: INTERNAL MEDICINE

## 2021-10-01 PROCEDURE — 99214 OFFICE O/P EST MOD 30 MIN: CPT | Mod: HCNC,S$GLB,, | Performed by: INTERNAL MEDICINE

## 2021-10-01 PROCEDURE — 93000 ELECTROCARDIOGRAM COMPLETE: CPT | Mod: HCNC,S$GLB,, | Performed by: INTERNAL MEDICINE

## 2021-10-01 RX ORDER — FUROSEMIDE 20 MG/1
20 TABLET ORAL DAILY PRN
Qty: 30 TABLET | Refills: 11 | Status: SHIPPED | OUTPATIENT
Start: 2021-10-01 | End: 2023-03-10

## 2021-11-09 ENCOUNTER — TELEPHONE (OUTPATIENT)
Dept: PODIATRY | Facility: CLINIC | Age: 86
End: 2021-11-09
Payer: MEDICARE

## 2021-11-19 ENCOUNTER — HOSPITAL ENCOUNTER (EMERGENCY)
Facility: HOSPITAL | Age: 86
Discharge: HOME | End: 2021-11-19
Attending: EMERGENCY MEDICINE
Payer: MEDICARE

## 2021-11-19 VITALS
RESPIRATION RATE: 21 BRPM | BODY MASS INDEX: 21.6 KG/M2 | WEIGHT: 110 LBS | SYSTOLIC BLOOD PRESSURE: 106 MMHG | HEART RATE: 78 BPM | TEMPERATURE: 98 F | HEIGHT: 60 IN | OXYGEN SATURATION: 96 % | DIASTOLIC BLOOD PRESSURE: 56 MMHG

## 2021-11-19 DIAGNOSIS — S32.591A CLOSED FRACTURE OF RAMUS OF RIGHT PUBIS, INITIAL ENCOUNTER: ICD-10-CM

## 2021-11-19 DIAGNOSIS — S00.03XA CONTUSION OF SCALP, INITIAL ENCOUNTER: Primary | ICD-10-CM

## 2021-11-19 LAB
ALBUMIN SERPL BCP-MCNC: 3.7 G/DL (ref 3.5–5.2)
ALP SERPL-CCNC: 71 U/L (ref 55–135)
ALT SERPL W/O P-5'-P-CCNC: 22 U/L (ref 10–44)
ANION GAP SERPL CALC-SCNC: 8 MMOL/L (ref 8–16)
AST SERPL-CCNC: 29 U/L (ref 10–40)
BASOPHILS # BLD AUTO: 0.04 K/UL (ref 0–0.2)
BASOPHILS NFR BLD: 0.4 % (ref 0–1.9)
BILIRUB SERPL-MCNC: 1.1 MG/DL (ref 0.1–1)
BUN SERPL-MCNC: 14 MG/DL (ref 10–30)
CALCIUM SERPL-MCNC: 9.2 MG/DL (ref 8.7–10.5)
CHLORIDE SERPL-SCNC: 102 MMOL/L (ref 95–110)
CO2 SERPL-SCNC: 27 MMOL/L (ref 23–29)
CREAT SERPL-MCNC: 1 MG/DL (ref 0.5–1.4)
DIFFERENTIAL METHOD: ABNORMAL
EOSINOPHIL # BLD AUTO: 0.1 K/UL (ref 0–0.5)
EOSINOPHIL NFR BLD: 0.6 % (ref 0–8)
ERYTHROCYTE [DISTWIDTH] IN BLOOD BY AUTOMATED COUNT: 12.8 % (ref 11.5–14.5)
EST. GFR  (AFRICAN AMERICAN): 56 ML/MIN/1.73 M^2
EST. GFR  (NON AFRICAN AMERICAN): 48 ML/MIN/1.73 M^2
GLUCOSE SERPL-MCNC: 101 MG/DL (ref 70–110)
HCT VFR BLD AUTO: 41.5 % (ref 37–48.5)
HGB BLD-MCNC: 13.8 G/DL (ref 12–16)
IMM GRANULOCYTES # BLD AUTO: 0.15 K/UL (ref 0–0.04)
IMM GRANULOCYTES NFR BLD AUTO: 1.3 % (ref 0–0.5)
INR PPP: 1 (ref 0.8–1.2)
LYMPHOCYTES # BLD AUTO: 1.1 K/UL (ref 1–4.8)
LYMPHOCYTES NFR BLD: 10 % (ref 18–48)
MCH RBC QN AUTO: 30.1 PG (ref 27–31)
MCHC RBC AUTO-ENTMCNC: 33.3 G/DL (ref 32–36)
MCV RBC AUTO: 91 FL (ref 82–98)
MONOCYTES # BLD AUTO: 0.5 K/UL (ref 0.3–1)
MONOCYTES NFR BLD: 4 % (ref 4–15)
NEUTROPHILS # BLD AUTO: 9.6 K/UL (ref 1.8–7.7)
NEUTROPHILS NFR BLD: 83.7 % (ref 38–73)
NRBC BLD-RTO: 0 /100 WBC
PLATELET # BLD AUTO: 235 K/UL (ref 150–450)
PMV BLD AUTO: 9 FL (ref 9.2–12.9)
POTASSIUM SERPL-SCNC: 4.2 MMOL/L (ref 3.5–5.1)
PROT SERPL-MCNC: 6.9 G/DL (ref 6–8.4)
PROTHROMBIN TIME: 10.3 SEC (ref 9–12.5)
RBC # BLD AUTO: 4.58 M/UL (ref 4–5.4)
SODIUM SERPL-SCNC: 137 MMOL/L (ref 136–145)
WBC # BLD AUTO: 11.42 K/UL (ref 3.9–12.7)

## 2021-11-19 PROCEDURE — 25000003 PHARM REV CODE 250: Mod: HCNC | Performed by: EMERGENCY MEDICINE

## 2021-11-19 PROCEDURE — 99284 EMERGENCY DEPT VISIT MOD MDM: CPT | Mod: 25,HCNC

## 2021-11-19 PROCEDURE — 63600175 PHARM REV CODE 636 W HCPCS: Mod: HCNC | Performed by: EMERGENCY MEDICINE

## 2021-11-19 PROCEDURE — 85610 PROTHROMBIN TIME: CPT | Mod: HCNC | Performed by: EMERGENCY MEDICINE

## 2021-11-19 PROCEDURE — 85025 COMPLETE CBC W/AUTO DIFF WBC: CPT | Mod: HCNC | Performed by: EMERGENCY MEDICINE

## 2021-11-19 PROCEDURE — 96375 TX/PRO/DX INJ NEW DRUG ADDON: CPT | Mod: HCNC

## 2021-11-19 PROCEDURE — 80053 COMPREHEN METABOLIC PANEL: CPT | Mod: HCNC | Performed by: EMERGENCY MEDICINE

## 2021-11-19 PROCEDURE — 96374 THER/PROPH/DIAG INJ IV PUSH: CPT | Mod: HCNC

## 2021-11-19 RX ORDER — ONDANSETRON 2 MG/ML
4 INJECTION INTRAMUSCULAR; INTRAVENOUS
Status: COMPLETED | OUTPATIENT
Start: 2021-11-19 | End: 2021-11-19

## 2021-11-19 RX ORDER — ACETAMINOPHEN 500 MG
1000 TABLET ORAL
Status: COMPLETED | OUTPATIENT
Start: 2021-11-19 | End: 2021-11-19

## 2021-11-19 RX ORDER — MORPHINE SULFATE 4 MG/ML
2 INJECTION, SOLUTION INTRAMUSCULAR; INTRAVENOUS
Status: COMPLETED | OUTPATIENT
Start: 2021-11-19 | End: 2021-11-19

## 2021-11-19 RX ORDER — HYDROCODONE BITARTRATE AND ACETAMINOPHEN 5; 325 MG/1; MG/1
1 TABLET ORAL EVERY 6 HOURS PRN
Qty: 25 TABLET | Refills: 0 | Status: SHIPPED | OUTPATIENT
Start: 2021-11-19 | End: 2021-11-29

## 2021-11-19 RX ADMIN — ACETAMINOPHEN 1000 MG: 500 TABLET ORAL at 03:11

## 2021-11-19 RX ADMIN — ONDANSETRON 4 MG: 2 INJECTION INTRAMUSCULAR; INTRAVENOUS at 03:11

## 2021-11-19 RX ADMIN — MORPHINE SULFATE 2 MG: 4 INJECTION INTRAVENOUS at 03:11

## 2021-11-20 ENCOUNTER — NURSE TRIAGE (OUTPATIENT)
Dept: ADMINISTRATIVE | Facility: CLINIC | Age: 86
End: 2021-11-20
Payer: MEDICARE

## 2021-11-21 PROCEDURE — G0180 PR HOME HEALTH MD CERTIFICATION: ICD-10-PCS | Mod: ,,, | Performed by: EMERGENCY MEDICINE

## 2021-11-21 PROCEDURE — G0180 MD CERTIFICATION HHA PATIENT: HCPCS | Mod: ,,, | Performed by: EMERGENCY MEDICINE

## 2021-11-22 ENCOUNTER — TELEPHONE (OUTPATIENT)
Dept: ORTHOPEDICS | Facility: CLINIC | Age: 86
End: 2021-11-22
Payer: MEDICARE

## 2021-11-22 DIAGNOSIS — S32.591A FRACTURE OF MULTIPLE PUBIC RAMI, RIGHT, CLOSED, INITIAL ENCOUNTER: Primary | ICD-10-CM

## 2021-11-24 ENCOUNTER — TELEPHONE (OUTPATIENT)
Dept: INTERNAL MEDICINE | Facility: CLINIC | Age: 86
End: 2021-11-24
Payer: MEDICARE

## 2021-11-26 ENCOUNTER — PATIENT MESSAGE (OUTPATIENT)
Dept: ORTHOPEDICS | Facility: CLINIC | Age: 86
End: 2021-11-26
Payer: MEDICARE

## 2021-11-26 ENCOUNTER — PATIENT MESSAGE (OUTPATIENT)
Dept: CARDIOLOGY | Facility: CLINIC | Age: 86
End: 2021-11-26
Payer: MEDICARE

## 2021-12-01 ENCOUNTER — EXTERNAL HOME HEALTH (OUTPATIENT)
Dept: HOME HEALTH SERVICES | Facility: HOSPITAL | Age: 86
End: 2021-12-01
Payer: MEDICARE

## 2021-12-02 ENCOUNTER — TELEPHONE (OUTPATIENT)
Dept: INTERNAL MEDICINE | Facility: CLINIC | Age: 86
End: 2021-12-02
Payer: MEDICARE

## 2021-12-03 DIAGNOSIS — S32.591D CLOSED FRACTURE OF RAMUS OF RIGHT PUBIS WITH ROUTINE HEALING, SUBSEQUENT ENCOUNTER: ICD-10-CM

## 2021-12-03 DIAGNOSIS — R26.9 GAIT DISTURBANCE: Primary | ICD-10-CM

## 2021-12-03 DIAGNOSIS — M19.90 OSTEOARTHRITIS, UNSPECIFIED OSTEOARTHRITIS TYPE, UNSPECIFIED SITE: ICD-10-CM

## 2021-12-06 ENCOUNTER — DOCUMENT SCAN (OUTPATIENT)
Dept: HOME HEALTH SERVICES | Facility: HOSPITAL | Age: 86
End: 2021-12-06
Payer: MEDICARE

## 2021-12-07 ENCOUNTER — DOCUMENT SCAN (OUTPATIENT)
Dept: HOME HEALTH SERVICES | Facility: HOSPITAL | Age: 86
End: 2021-12-07
Payer: MEDICARE

## 2021-12-10 ENCOUNTER — DOCUMENT SCAN (OUTPATIENT)
Dept: HOME HEALTH SERVICES | Facility: HOSPITAL | Age: 86
End: 2021-12-10
Payer: MEDICARE

## 2021-12-16 RX ORDER — EZETIMIBE 10 MG/1
TABLET ORAL
Qty: 90 TABLET | Refills: 1 | Status: SHIPPED | OUTPATIENT
Start: 2021-12-16 | End: 2022-09-16 | Stop reason: SDUPTHER

## 2021-12-21 ENCOUNTER — PATIENT MESSAGE (OUTPATIENT)
Dept: INTERNAL MEDICINE | Facility: CLINIC | Age: 86
End: 2021-12-21
Payer: MEDICARE

## 2021-12-21 DIAGNOSIS — R39.9 LOWER URINARY TRACT SYMPTOMS: ICD-10-CM

## 2021-12-21 DIAGNOSIS — R41.0 CONFUSION: Primary | ICD-10-CM

## 2021-12-27 ENCOUNTER — PATIENT OUTREACH (OUTPATIENT)
Dept: ADMINISTRATIVE | Facility: OTHER | Age: 86
End: 2021-12-27
Payer: MEDICARE

## 2021-12-28 DIAGNOSIS — M25.559 HIP PAIN: Primary | ICD-10-CM

## 2021-12-29 ENCOUNTER — TELEPHONE (OUTPATIENT)
Dept: INTERNAL MEDICINE | Facility: CLINIC | Age: 86
End: 2021-12-29
Payer: MEDICARE

## 2021-12-29 ENCOUNTER — PATIENT MESSAGE (OUTPATIENT)
Dept: INTERNAL MEDICINE | Facility: CLINIC | Age: 86
End: 2021-12-29
Payer: MEDICARE

## 2021-12-29 DIAGNOSIS — R41.0 CONFUSION: Primary | ICD-10-CM

## 2021-12-30 DIAGNOSIS — N39.0 URINARY TRACT INFECTION WITHOUT HEMATURIA, SITE UNSPECIFIED: Primary | ICD-10-CM

## 2021-12-30 RX ORDER — AMOXICILLIN 500 MG/1
500 TABLET, FILM COATED ORAL 2 TIMES DAILY
Qty: 14 TABLET | Refills: 0 | Status: SHIPPED | OUTPATIENT
Start: 2021-12-30 | End: 2022-01-02 | Stop reason: HOSPADM

## 2021-12-31 ENCOUNTER — LAB VISIT (OUTPATIENT)
Dept: LAB | Facility: HOSPITAL | Age: 86
End: 2021-12-31
Attending: NURSE PRACTITIONER
Payer: MEDICARE

## 2021-12-31 DIAGNOSIS — N39.0 URINARY TRACT INFECTION, SITE NOT SPECIFIED: Primary | ICD-10-CM

## 2021-12-31 DIAGNOSIS — N39.0 URINARY TRACT INFECTION WITHOUT HEMATURIA, SITE UNSPECIFIED: ICD-10-CM

## 2021-12-31 LAB
BILIRUB UR QL STRIP: NEGATIVE
CLARITY UR: CLEAR
COLOR UR: YELLOW
GLUCOSE UR QL STRIP: NEGATIVE
HGB UR QL STRIP: NEGATIVE
KETONES UR QL STRIP: NEGATIVE
LEUKOCYTE ESTERASE UR QL STRIP: NEGATIVE
NITRITE UR QL STRIP: NEGATIVE
PH UR STRIP: 7 [PH] (ref 5–8)
PROT UR QL STRIP: NEGATIVE
SP GR UR STRIP: 1.02 (ref 1–1.03)
URN SPEC COLLECT METH UR: ABNORMAL
UROBILINOGEN UR STRIP-ACNC: ABNORMAL EU/DL

## 2021-12-31 PROCEDURE — 87086 URINE CULTURE/COLONY COUNT: CPT | Mod: HCNC | Performed by: INTERNAL MEDICINE

## 2021-12-31 PROCEDURE — 81003 URINALYSIS AUTO W/O SCOPE: CPT | Mod: HCNC | Performed by: INTERNAL MEDICINE

## 2022-01-02 LAB — BACTERIA UR CULT: NORMAL

## 2022-01-03 ENCOUNTER — TELEPHONE (OUTPATIENT)
Dept: INTERNAL MEDICINE | Facility: CLINIC | Age: 87
End: 2022-01-03
Payer: MEDICARE

## 2022-01-03 NOTE — TELEPHONE ENCOUNTER
Spoke to daughter Marisol. Verbalized test results and pt daughter verbalized understanding with readback.

## 2022-01-07 ENCOUNTER — OFFICE VISIT (OUTPATIENT)
Dept: ORTHOPEDICS | Facility: CLINIC | Age: 87
End: 2022-01-07
Payer: MEDICARE

## 2022-01-07 ENCOUNTER — APPOINTMENT (OUTPATIENT)
Dept: RADIOLOGY | Facility: HOSPITAL | Age: 87
End: 2022-01-07
Attending: ORTHOPAEDIC SURGERY
Payer: MEDICARE

## 2022-01-07 VITALS
SYSTOLIC BLOOD PRESSURE: 107 MMHG | DIASTOLIC BLOOD PRESSURE: 65 MMHG | RESPIRATION RATE: 18 BRPM | HEIGHT: 60 IN | BODY MASS INDEX: 21.6 KG/M2 | WEIGHT: 110 LBS | HEART RATE: 63 BPM | OXYGEN SATURATION: 99 %

## 2022-01-07 DIAGNOSIS — M25.559 HIP PAIN: ICD-10-CM

## 2022-01-07 DIAGNOSIS — S32.511A CLOSED FRACTURE OF SUPERIOR PUBIC RAMUS, RIGHT, INITIAL ENCOUNTER: Primary | ICD-10-CM

## 2022-01-07 PROCEDURE — 99999 PR PBB SHADOW E&M-EST. PATIENT-LVL III: ICD-10-PCS | Mod: PBBFAC,HCNC,, | Performed by: ORTHOPAEDIC SURGERY

## 2022-01-07 PROCEDURE — 73502 XR HIP WITH PELVIS WHEN PERFORMED, 2 OR 3  VIEWS RIGHT: ICD-10-PCS | Mod: 26,HCNC,RT, | Performed by: RADIOLOGY

## 2022-01-07 PROCEDURE — 99203 PR OFFICE/OUTPT VISIT, NEW, LEVL III, 30-44 MIN: ICD-10-PCS | Mod: HCNC,S$GLB,, | Performed by: ORTHOPAEDIC SURGERY

## 2022-01-07 PROCEDURE — 1159F PR MEDICATION LIST DOCUMENTED IN MEDICAL RECORD: ICD-10-PCS | Mod: HCNC,CPTII,S$GLB, | Performed by: ORTHOPAEDIC SURGERY

## 2022-01-07 PROCEDURE — 1159F MED LIST DOCD IN RCRD: CPT | Mod: HCNC,CPTII,S$GLB, | Performed by: ORTHOPAEDIC SURGERY

## 2022-01-07 PROCEDURE — 73502 X-RAY EXAM HIP UNI 2-3 VIEWS: CPT | Mod: TC,HCNC,FY,PN,RT

## 2022-01-07 PROCEDURE — 73502 X-RAY EXAM HIP UNI 2-3 VIEWS: CPT | Mod: 26,HCNC,RT, | Performed by: RADIOLOGY

## 2022-01-07 PROCEDURE — 99999 PR PBB SHADOW E&M-EST. PATIENT-LVL III: CPT | Mod: PBBFAC,HCNC,, | Performed by: ORTHOPAEDIC SURGERY

## 2022-01-07 PROCEDURE — 99203 OFFICE O/P NEW LOW 30 MIN: CPT | Mod: HCNC,S$GLB,, | Performed by: ORTHOPAEDIC SURGERY

## 2022-01-07 PROCEDURE — 1125F AMNT PAIN NOTED PAIN PRSNT: CPT | Mod: HCNC,CPTII,S$GLB, | Performed by: ORTHOPAEDIC SURGERY

## 2022-01-07 PROCEDURE — 1125F PR PAIN SEVERITY QUANTIFIED, PAIN PRESENT: ICD-10-PCS | Mod: HCNC,CPTII,S$GLB, | Performed by: ORTHOPAEDIC SURGERY

## 2022-01-07 NOTE — PROGRESS NOTES
NEW PATIENT ORTHOPAEDIC: HIP    PRIMARY CARE PHYSICIAN: Ivette Mcdaniel MD   REFERRING PROVIDER: Radha Kurtz MD  [address] [facility]  [address] [address]     ASSESSMENT & PLAN:    Impression:  Right superior pubic rami fracture    Follow Up Plan: PRN      Non operative care:    Nany Crenshaw has physical exam evidence of above and wishes to pursue an non-operative care. I am recommending the following:  Continued conservative management.  Patient can be weight-bearing as tolerated and continue working with home physical therapy.  Her x-rays show some interval healing of her superior pubic ramus fracture.  The more importantly her pain is improving and she is able to ambulate with a walker.  She has positional pain with lifting her leg in and out of bed otherwise she is doing okay.  Not requiring any significant pain medicines.  Ultimately this patient's injury will be self-limiting and will heal.  Advised fall precautions with use of walker whenever up ambulating to avoid any additional fractures or falls.  All the patient's questions were answered.  They will follow-up as needed in the future should her condition change.    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    Benign hypertension    GERD (gastroesophageal reflux disease)    Hypothyroidism (acquired)    Coronary artery disease    Ptosis, bilateral    Osteoporosis, post-menopausal    Varicose veins    Pure hypercholesterolemia    Other and unspecified hyperlipidemia    Peripheral vascular disease, unspecified    Dizziness    Gait disturbance    Impaired mobility and ADLs    Adjustment reaction with anxiety and depression    Right leg DVT    Dyslipidemia    Hyponatremia    Vomiting    Abdominal cyst    Refractive error    Blepharitis of upper and lower eyelids of both eyes    Near syncope            SUBJECTIVE    CHIEF COMPLAINT: Hip Pain    HPI:   Nany Crenshaw is a 89 y.o. female here for evaluation and management of right hip pain. There is a specific incident that brought about this pain where she had a ground level fall in November.  She presented to the emergency room where CT was obtained and demonstrate a mildly displaced superior pubic ramus fracture.  She was treated as an outpatient with no hip precautions and weight-bearing as tolerated restrictions.  She her pain has significantly improved since the injury but she still has mild pain: walking, rising from a sitting position and standing for prolonged periods of time    Currently the pain in the joint is mild with activity.  The pain is intermittent and is located in groin.  The pain is described as aching. Relieving factors include ambulatory device, rest and repositioning.     Nany Crenshaw has no additional complaints.     PROGRESSIVE SYMPTOMS:  None    FUNCTIONAL STATUS:   Limited most or all of the time (uses scooter, mobility device)    PREVIOUS TREATMENTS:  Medical: Tylenol  Physical Therapy: Formal Physical Therapy for 6 weeks  Previous Orthopaedic Surgery: None    REVIEW OF SYSTEMS:  PAIN ASSESSMENT:  See HPI.  MUSCULOSKELETAL: See HPI.  OTHER 10 point review of systems is negative except as stated in HPI above    PAST MEDICAL HISTORY   has a past medical history of Colon polyp, Coronary artery disease, Diverticulosis, Fracture of right distal radius, GERD (gastroesophageal reflux disease), Hypertension, Hypothyroidism (acquired), Osteoporosis, post-menopausal, and Scoliosis.     PAST SURGICAL HISTORY   has a past surgical history that includes Cataract extraction; yag cap; Hysterectomy; Adenoidectomy;  section; Cholecystectomy; and Eye surgery (2014).     FAMILY HISTORY  family history includes Cancer in her brother and mother; Dementia in her mother; Heart disease in her brother; Hypertension in her mother; No Known  Problems in her father, maternal aunt, maternal grandfather, maternal grandmother, maternal uncle, paternal aunt, paternal grandfather, paternal grandmother, paternal uncle, and sister.     SOCIAL HISTORY   reports that she has never smoked. She has never used smokeless tobacco. She reports current alcohol use of about 6.0 standard drinks of alcohol per week. She reports that she does not use drugs.     ALLERGIES   Review of patient's allergies indicates:   Allergen Reactions    Bactrim [sulfamethoxazole-trimethoprim] Nausea And Vomiting        MEDICATIONS   Current Outpatient Medications on File Prior to Visit   Medication Sig Dispense Refill    apixaban (ELIQUIS) 2.5 mg Tab Take 1 tablet (2.5 mg total) by mouth 2 (two) times a day. 180 tablet 3    ciclopirox (PENLAC) 8 % Soln Apply topically nightly. 1 Bottle 3    ELIQUIS 5 mg Tab TAKE 1 TABLET(5 MG) BY MOUTH TWICE DAILY 180 tablet 3    ezetimibe (ZETIA) 10 mg tablet TAKE 1 TABLET(10 MG) BY MOUTH EVERY DAY 90 tablet 1    furosemide (LASIX) 20 MG tablet Take 1 tablet (20 mg total) by mouth daily as needed (ankle swelling). 30 tablet 11    levothyroxine (SYNTHROID) 50 MCG tablet TAKE 1 TABLET BY MOUTH DAILY 90 tablet 4    lisinopriL 10 MG tablet TAKE 1 AND 1/2 TABLETS(15 MG) BY MOUTH EVERY  tablet 3    metoprolol tartrate (LOPRESSOR) 25 MG tablet TAKE 1 TABLET BY MOUTH TWICE DAILY 180 tablet 4    nystatin (MYCOSTATIN) cream Apply topically 2 (two) times daily as needed for Dry Skin. 30 g 2    simvastatin (ZOCOR) 40 MG tablet TAKE 1 TABLET(40 MG) BY MOUTH EVERY EVENING 90 tablet 1     No current facility-administered medications on file prior to visit.          PHYSICAL EXAM   height is 5' (1.524 m) and weight is 49.9 kg (110 lb). Her blood pressure is 107/65 and her pulse is 63. Her respiration is 18 and oxygen saturation is 99%.   Body mass index is 21.48 kg/m².      All other systems deferred.  GENERAL:  No acute distress  HABITUS: Normal  GAIT:  Antalgic  SKIN: Normal     HIP EXAM:    right:   ROM:   Flexion: 120 degrees    Internal Rotation: 30 degrees    External Rotation: 30 degrees    Abduction: 45 degrees    Adduction: 20 degrees  No apparent leg length discrepancy    Palpation: no tenderness over greater trochanter, SI joint, ilioposas, IT band, gluteal musculature   Pain is reproduced with IR of the hip  Strength: 5/5 hip flexion, abduction, knee flexion and extension   Straight leg raise: Negative   Neurovascular Status: Sensation intact to light touch in Sural, saphenous, SPN, DPN, Tibial nerve distribution  2+ pulse DP/PT, normal capillary refill, foot has normal warmth    DATA:  Diagnostic tests reviewed for today's visit:     Hip radiographs appears to show a nondisplaced superior pubic ramus fracture with interval healing.

## 2022-01-10 ENCOUNTER — OFFICE VISIT (OUTPATIENT)
Dept: INTERNAL MEDICINE | Facility: CLINIC | Age: 87
End: 2022-01-10
Payer: MEDICARE

## 2022-01-10 VITALS
RESPIRATION RATE: 18 BRPM | OXYGEN SATURATION: 95 % | DIASTOLIC BLOOD PRESSURE: 68 MMHG | HEART RATE: 62 BPM | HEIGHT: 60 IN | TEMPERATURE: 98 F | SYSTOLIC BLOOD PRESSURE: 110 MMHG | BODY MASS INDEX: 21.48 KG/M2

## 2022-01-10 DIAGNOSIS — Z23 FLU VACCINE NEED: Primary | ICD-10-CM

## 2022-01-10 DIAGNOSIS — E03.9 HYPOTHYROIDISM (ACQUIRED): ICD-10-CM

## 2022-01-10 DIAGNOSIS — I10 PRIMARY HYPERTENSION: ICD-10-CM

## 2022-01-10 DIAGNOSIS — I73.9 PERIPHERAL VASCULAR DISEASE, UNSPECIFIED: ICD-10-CM

## 2022-01-10 DIAGNOSIS — E78.00 PURE HYPERCHOLESTEROLEMIA: ICD-10-CM

## 2022-01-10 DIAGNOSIS — Z79.01 CHRONIC ANTICOAGULATION: ICD-10-CM

## 2022-01-10 DIAGNOSIS — S32.591D CLOSED FRACTURE OF RAMUS OF RIGHT PUBIS WITH ROUTINE HEALING, SUBSEQUENT ENCOUNTER: ICD-10-CM

## 2022-01-10 DIAGNOSIS — R26.9 GAIT DISTURBANCE: ICD-10-CM

## 2022-01-10 PROCEDURE — 99999 PR PBB SHADOW E&M-EST. PATIENT-LVL III: ICD-10-PCS | Mod: PBBFAC,HCNC,, | Performed by: INTERNAL MEDICINE

## 2022-01-10 PROCEDURE — 99499 RISK ADDL DX/OHS AUDIT: ICD-10-PCS | Mod: HCNC,S$GLB,, | Performed by: INTERNAL MEDICINE

## 2022-01-10 PROCEDURE — 1100F PTFALLS ASSESS-DOCD GE2>/YR: CPT | Mod: HCNC,CPTII,S$GLB, | Performed by: INTERNAL MEDICINE

## 2022-01-10 PROCEDURE — 1159F MED LIST DOCD IN RCRD: CPT | Mod: HCNC,CPTII,S$GLB, | Performed by: INTERNAL MEDICINE

## 2022-01-10 PROCEDURE — 99214 PR OFFICE/OUTPT VISIT, EST, LEVL IV, 30-39 MIN: ICD-10-PCS | Mod: HCNC,S$GLB,, | Performed by: INTERNAL MEDICINE

## 2022-01-10 PROCEDURE — 1159F PR MEDICATION LIST DOCUMENTED IN MEDICAL RECORD: ICD-10-PCS | Mod: HCNC,CPTII,S$GLB, | Performed by: INTERNAL MEDICINE

## 2022-01-10 PROCEDURE — 99999 PR PBB SHADOW E&M-EST. PATIENT-LVL III: CPT | Mod: PBBFAC,HCNC,, | Performed by: INTERNAL MEDICINE

## 2022-01-10 PROCEDURE — 3288F PR FALLS RISK ASSESSMENT DOCUMENTED: ICD-10-PCS | Mod: HCNC,CPTII,S$GLB, | Performed by: INTERNAL MEDICINE

## 2022-01-10 PROCEDURE — 1126F AMNT PAIN NOTED NONE PRSNT: CPT | Mod: HCNC,CPTII,S$GLB, | Performed by: INTERNAL MEDICINE

## 2022-01-10 PROCEDURE — 1160F PR REVIEW ALL MEDS BY PRESCRIBER/CLIN PHARMACIST DOCUMENTED: ICD-10-PCS | Mod: HCNC,CPTII,S$GLB, | Performed by: INTERNAL MEDICINE

## 2022-01-10 PROCEDURE — 99499 UNLISTED E&M SERVICE: CPT | Mod: HCNC,S$GLB,, | Performed by: INTERNAL MEDICINE

## 2022-01-10 PROCEDURE — 1126F PR PAIN SEVERITY QUANTIFIED, NO PAIN PRESENT: ICD-10-PCS | Mod: HCNC,CPTII,S$GLB, | Performed by: INTERNAL MEDICINE

## 2022-01-10 PROCEDURE — 1100F PR PT FALLS ASSESS DOC 2+ FALLS/FALL W/INJURY/YR: ICD-10-PCS | Mod: HCNC,CPTII,S$GLB, | Performed by: INTERNAL MEDICINE

## 2022-01-10 PROCEDURE — 99214 OFFICE O/P EST MOD 30 MIN: CPT | Mod: HCNC,S$GLB,, | Performed by: INTERNAL MEDICINE

## 2022-01-10 PROCEDURE — 1160F RVW MEDS BY RX/DR IN RCRD: CPT | Mod: HCNC,CPTII,S$GLB, | Performed by: INTERNAL MEDICINE

## 2022-01-10 PROCEDURE — 3288F FALL RISK ASSESSMENT DOCD: CPT | Mod: HCNC,CPTII,S$GLB, | Performed by: INTERNAL MEDICINE

## 2022-01-10 NOTE — PROGRESS NOTES
Verified patient name and . Advised patient to wait 15 mins. post injection of fluzone HD. Patient tolerated well.

## 2022-01-10 NOTE — PROGRESS NOTES
Subjective:       Patient ID: Nany Crenshaw is a 89 y.o. female.    Chief Complaint: Follow-up  This is a 89-year-old who presents today for follow-up.  Patient reports that she has been doing better she had an episode where she fell and fractured her right pubic ramus she was sent home with conservative measures did have home health and has been working on trying to bear weight over time she had follow-up with orthopedist and with discharge she did not take pain medications due to family's concern about sedation and seem to do well in general she is recovering without difficulty.  She did hold her blood thinner for a bit since she had a hematoma at that time and then resumed it she has a walker at home and they are trying to encourage her to use that more consistently when she gets up and around she does have some issues with dry skin and some seborrheic keratosis on her arms and chest.  Daughter reports she has care with family and they monitor her closely her memory has declined over time but not agitation .  She seems to be doing well and eating okay at home.  They would like to get her flu shot while she is here      HPI  Review of Systems   Cardiovascular: Negative for chest pain.   Genitourinary: Negative for difficulty urinating and dysuria.   Musculoskeletal:        Pain improved able to ambulate   Better has wakler no further falls        Objective:     Blood pressure 110/68, pulse 62, temperature 98.4 °F (36.9 °C), resp. rate 18, height 5' (1.524 m), SpO2 95 %.    Physical Exam  Constitutional:       General: She is not in acute distress.     Comments: Seated in wheelchair    HENT:      Head: Normocephalic.      Mouth/Throat:      Mouth: Oropharynx is clear and moist.   Eyes:      General: No scleral icterus.  Cardiovascular:      Rate and Rhythm: Normal rate and regular rhythm.      Heart sounds: Normal heart sounds. No murmur heard.  No friction rub. No gallop.    Pulmonary:      Effort: Pulmonary  effort is normal. No respiratory distress.      Breath sounds: Normal breath sounds.   Abdominal:      General: Bowel sounds are normal.      Palpations: Abdomen is soft. There is no mass.      Tenderness: There is no abdominal tenderness.   Musculoskeletal:         General: No edema.      Cervical back: Neck supple.   Skin:     Findings: No erythema.      Comments: dermaitits hands and chest  Dry skin seb derm    Neurological:      Mental Status: She is alert.   Psychiatric:         Mood and Affect: Mood and affect normal.         Assessment:       1. Primary hypertension    2. Hypothyroidism (acquired)    3. Gait disturbance    4. Chronic anticoagulation    5. Pure hypercholesterolemia    6. Peripheral vascular disease, unspecified    7. Closed fracture of ramus of right pubis with routine healing, subsequent encounter        Plan:       Nany was seen today for follow-up.    Diagnoses and all orders for this visit:    Primary hypertension  Blood pressure acceptable    Hypothyroidism (acquired)  Euthyroid continue current regimen    Gait disturbance  Conservative measures fall precautions she has a walker and was encouraged to use it family is assisting her as well when needed    Chronic anticoagulation  Venous insufficiencty   History of DVT chronic anticoagulation follows by Cardiology    Pure hypercholesterolemia  Peripheral vascular disease, unspecified  History of she remains on medications simvastatin and zetia     Closed fracture of ramus of right pubis with routine healing, subsequent encounter  History of improved range of motion and able to bear weight she was released by orthopedist    Flu shot recommended  Recent labs reviewed    Dry skin conservative measures discussed moisturize she will call if she would like to see Dermatology    Osteoporosis we discussed options she has difficulty taking pills they were not interested in updating bone density or taking additional medications continue fall  precautions    She will return for her annual in may when due    Continue fall precautions

## 2022-01-14 ENCOUNTER — PATIENT MESSAGE (OUTPATIENT)
Dept: ORTHOPEDICS | Facility: CLINIC | Age: 87
End: 2022-01-14
Payer: MEDICARE

## 2022-01-14 DIAGNOSIS — S32.511A CLOSED FRACTURE OF SUPERIOR PUBIC RAMUS, RIGHT, INITIAL ENCOUNTER: Primary | ICD-10-CM

## 2022-01-19 ENCOUNTER — TELEPHONE (OUTPATIENT)
Dept: INTERNAL MEDICINE | Facility: CLINIC | Age: 87
End: 2022-01-19
Payer: MEDICARE

## 2022-01-19 NOTE — TELEPHONE ENCOUNTER
----- Message from Amber Callejas sent at 1/19/2022 10:25 AM CST -----  Contact: Elizabeth [phone]  Elizabeth with [facility] she would like to see if the Dr can sign rectification orders for them for the pt..the family wants to continue home health services with PT the actually ends today.. please advise and give return call

## 2022-01-19 NOTE — TELEPHONE ENCOUNTER
Jerome CARMONA states family wants to cont. Home health for pt Home health order will end on today.

## 2022-01-20 PROCEDURE — G0179 PR HOME HEALTH MD RECERTIFICATION: ICD-10-PCS | Mod: ,,, | Performed by: INTERNAL MEDICINE

## 2022-01-20 PROCEDURE — G0179 MD RECERTIFICATION HHA PT: HCPCS | Mod: ,,, | Performed by: INTERNAL MEDICINE

## 2022-01-26 ENCOUNTER — PATIENT MESSAGE (OUTPATIENT)
Dept: INTERNAL MEDICINE | Facility: CLINIC | Age: 87
End: 2022-01-26
Payer: MEDICARE

## 2022-01-26 ENCOUNTER — EXTERNAL HOME HEALTH (OUTPATIENT)
Dept: HOME HEALTH SERVICES | Facility: HOSPITAL | Age: 87
End: 2022-01-26
Payer: MEDICARE

## 2022-01-26 DIAGNOSIS — Z78.0 POSTMENOPAUSAL: Primary | ICD-10-CM

## 2022-01-26 DIAGNOSIS — M81.0 OSTEOPOROSIS, UNSPECIFIED OSTEOPOROSIS TYPE, UNSPECIFIED PATHOLOGICAL FRACTURE PRESENCE: ICD-10-CM

## 2022-01-26 NOTE — TELEPHONE ENCOUNTER
Called and spoke with daughter  Discussed  She would like update bone density  And endocrinolgoy appointment after to discusse options for treatment    Please call and schedule bone density then endocrinolgy appt after

## 2022-02-07 ENCOUNTER — DOCUMENT SCAN (OUTPATIENT)
Dept: HOME HEALTH SERVICES | Facility: HOSPITAL | Age: 87
End: 2022-02-07
Payer: MEDICARE

## 2022-02-17 ENCOUNTER — HOSPITAL ENCOUNTER (OUTPATIENT)
Dept: RADIOLOGY | Facility: CLINIC | Age: 87
Discharge: HOME | End: 2022-02-17
Attending: INTERNAL MEDICINE
Payer: MEDICARE

## 2022-02-17 DIAGNOSIS — Z78.0 POSTMENOPAUSAL: ICD-10-CM

## 2022-02-17 PROCEDURE — 77081 DEXA BONE DENSITY APPENDICULAR SKELETON: ICD-10-PCS | Mod: 26,HCNC,, | Performed by: INTERNAL MEDICINE

## 2022-02-17 PROCEDURE — 77081 DXA BONE DENSITY APPENDICULR: CPT | Mod: 26,HCNC,, | Performed by: INTERNAL MEDICINE

## 2022-02-17 PROCEDURE — 77081 DXA BONE DENSITY APPENDICULR: CPT | Mod: TC,HCNC,PO

## 2022-03-07 ENCOUNTER — OFFICE VISIT (OUTPATIENT)
Dept: PODIATRY | Facility: CLINIC | Age: 87
End: 2022-03-07
Payer: MEDICARE

## 2022-03-07 VITALS — WEIGHT: 110 LBS | HEIGHT: 60 IN | BODY MASS INDEX: 21.6 KG/M2

## 2022-03-07 DIAGNOSIS — Z79.01 LONG TERM CURRENT USE OF ANTICOAGULANT THERAPY: Primary | ICD-10-CM

## 2022-03-07 DIAGNOSIS — I73.9 PERIPHERAL VASCULAR DISEASE, UNSPECIFIED: ICD-10-CM

## 2022-03-07 DIAGNOSIS — B35.1 ONYCHOMYCOSIS DUE TO DERMATOPHYTE: ICD-10-CM

## 2022-03-07 PROCEDURE — 99214 OFFICE O/P EST MOD 30 MIN: CPT | Mod: 25,S$GLB,, | Performed by: PODIATRIST

## 2022-03-07 PROCEDURE — 1126F AMNT PAIN NOTED NONE PRSNT: CPT | Mod: CPTII,S$GLB,, | Performed by: PODIATRIST

## 2022-03-07 PROCEDURE — 1159F MED LIST DOCD IN RCRD: CPT | Mod: CPTII,S$GLB,, | Performed by: PODIATRIST

## 2022-03-07 PROCEDURE — 1126F PR PAIN SEVERITY QUANTIFIED, NO PAIN PRESENT: ICD-10-PCS | Mod: CPTII,S$GLB,, | Performed by: PODIATRIST

## 2022-03-07 PROCEDURE — 1160F PR REVIEW ALL MEDS BY PRESCRIBER/CLIN PHARMACIST DOCUMENTED: ICD-10-PCS | Mod: CPTII,S$GLB,, | Performed by: PODIATRIST

## 2022-03-07 PROCEDURE — 99214 PR OFFICE/OUTPT VISIT, EST, LEVL IV, 30-39 MIN: ICD-10-PCS | Mod: 25,S$GLB,, | Performed by: PODIATRIST

## 2022-03-07 PROCEDURE — 1100F PTFALLS ASSESS-DOCD GE2>/YR: CPT | Mod: CPTII,S$GLB,, | Performed by: PODIATRIST

## 2022-03-07 PROCEDURE — 3288F FALL RISK ASSESSMENT DOCD: CPT | Mod: CPTII,S$GLB,, | Performed by: PODIATRIST

## 2022-03-07 PROCEDURE — 11721 DEBRIDE NAIL 6 OR MORE: CPT | Mod: Q9,S$GLB,, | Performed by: PODIATRIST

## 2022-03-07 PROCEDURE — 11721 PR DEBRIDEMENT OF NAILS, 6 OR MORE: ICD-10-PCS | Mod: Q9,S$GLB,, | Performed by: PODIATRIST

## 2022-03-07 PROCEDURE — 1159F PR MEDICATION LIST DOCUMENTED IN MEDICAL RECORD: ICD-10-PCS | Mod: CPTII,S$GLB,, | Performed by: PODIATRIST

## 2022-03-07 PROCEDURE — 3288F PR FALLS RISK ASSESSMENT DOCUMENTED: ICD-10-PCS | Mod: CPTII,S$GLB,, | Performed by: PODIATRIST

## 2022-03-07 PROCEDURE — 1160F RVW MEDS BY RX/DR IN RCRD: CPT | Mod: CPTII,S$GLB,, | Performed by: PODIATRIST

## 2022-03-07 PROCEDURE — 99999 PR PBB SHADOW E&M-EST. PATIENT-LVL III: ICD-10-PCS | Mod: PBBFAC,,, | Performed by: PODIATRIST

## 2022-03-07 PROCEDURE — 1100F PR PT FALLS ASSESS DOC 2+ FALLS/FALL W/INJURY/YR: ICD-10-PCS | Mod: CPTII,S$GLB,, | Performed by: PODIATRIST

## 2022-03-07 PROCEDURE — 99999 PR PBB SHADOW E&M-EST. PATIENT-LVL III: CPT | Mod: PBBFAC,,, | Performed by: PODIATRIST

## 2022-03-07 RX ORDER — CICLOPIROX 80 MG/ML
SOLUTION TOPICAL NIGHTLY
Qty: 6.6 ML | Refills: 11 | Status: SHIPPED | OUTPATIENT
Start: 2022-03-07 | End: 2023-03-06

## 2022-03-07 NOTE — PROGRESS NOTES
Subjective:      Patient ID: Nany Crenshaw is a 89 y.o. female.    Chief Complaint: Nail Care and Foot Problem (Swelling)    Nany is a 89 y.o. female who presents to the clinic for evaluation and treatment of high risk feet. Nany has a past medical history of Colon polyp, Coronary artery disease, Diverticulosis, Fracture of right distal radius, GERD (gastroesophageal reflux disease), Hypertension, Hypothyroidism (acquired), Osteoporosis, post-menopausal, and Scoliosis. The patient's chief complaint is long, thick toenails. Gradual onset, worsening over past several weeks, aggravated by increased weight bearing, shoe gear, pressure.  No previous medical treatment.  OTC pain med not helping. Denies trauma, surgery..    PCP: Ivette Mcdaniel MD    Date Last Seen by PCP:   Chief Complaint   Patient presents with    Nail Care    Foot Problem     Swelling        Current shoe gear:  Affected Foot: Casual shoes     Unaffected Foot: Casual shoes    Last encounter in this department: 9/27/2021    Hemoglobin A1C   Date Value Ref Range Status   01/20/2005 5.6 4.5 - 6.2 % Final       Review of Systems   Constitutional: Negative for chills, diaphoresis, fever, malaise/fatigue and night sweats.   Cardiovascular: Positive for leg swelling. Negative for claudication, cyanosis and syncope.   Skin: Positive for nail changes. Negative for color change, dry skin, rash, suspicious lesions and unusual hair distribution.   Musculoskeletal: Negative for falls, joint pain, joint swelling, muscle cramps, muscle weakness and stiffness.   Gastrointestinal: Negative for constipation, diarrhea, nausea and vomiting.   Neurological: Positive for paresthesias and sensory change. Negative for brief paralysis, disturbances in coordination, focal weakness, numbness and tremors.           Objective:      Physical Exam  Constitutional:       General: She is not in acute distress.     Appearance: She is well-developed. She is not diaphoretic.    Cardiovascular:      Pulses:           Popliteal pulses are 2+ on the right side and 2+ on the left side.        Dorsalis pedis pulses are 1+ on the right side and 1+ on the left side.        Posterior tibial pulses are 1+ on the right side and 1+ on the left side.      Comments: Capillary refill 3 seconds all toes/distal feet, all toes/both feet warm to touch.      Negative lymphadenopathy bilateral popliteal fossa and tarsal tunnel.      <2+ pitting lower extremity edema bilateral.    Musculoskeletal:      Right ankle: No swelling, deformity, ecchymosis or lacerations. Normal range of motion. Normal pulse.      Right Achilles Tendon: Normal. No defects. Jeff's test negative.      Comments: Reduced stride and toe off.. All ten toes without clubbing, cyanosis, or signs of ischemia.  No pain to palpation bilateral lower extremities.  Range of motion, stability, muscle strength, and muscle tone normal bilateral feet and legs.    Lymphadenopathy:      Lower Body: No right inguinal adenopathy. No left inguinal adenopathy.      Comments: Negative lymphadenopathy bilateral popliteal fossa and tarsal tunnel.    Negative lymphangitic streaking bilateral feet/ankles/legs.   Skin:     General: Skin is warm and dry.      Capillary Refill: Capillary refill takes 2 to 3 seconds.      Coloration: Skin is not pale.      Findings: No abrasion, bruising, burn, ecchymosis, erythema, laceration, lesion or rash.      Nails: There is no clubbing.      Comments:   Skin thin, shiny, atrophic, with decreased density and distribution of pedal hair bilateral, but without hyperpigmentation, boris discoloration,  ulcers, masses, nodules or cords palpated bilateral feet and legs.    Toenails 1st, 2nd, 3rd, 4th, 5th  bilateral are hypertrophic thickened 2-3 mm, dystrophic, discolored tanish brown with tan, gray crumbly subungual debris.  Tender to distal nail plate pressure, without periungual skin abnormality of each.       Neurological:       Mental Status: She is alert and oriented to person, place, and time.      Sensory: No sensory deficit.      Motor: No tremor, atrophy or abnormal muscle tone.      Gait: Gait normal.      Deep Tendon Reflexes:      Reflex Scores:       Patellar reflexes are 2+ on the right side and 2+ on the left side.       Achilles reflexes are 2+ on the right side and 2+ on the left side.     Comments: Paresthesias,intermittently bilateral feet with no clearly identified trigger or source.    Negative tinel sign to percussion sural, superficial peroneal, deep peroneal, saphenous, and posterior tibial nerves right and left ankles and feet.     Psychiatric:         Behavior: Behavior is cooperative.               Assessment:       Encounter Diagnoses   Name Primary?    Long term current use of anticoagulant therapy Yes    Onychomycosis due to dermatophyte     Peripheral vascular disease, unspecified          Plan:       Nany was seen today for nail care and foot problem.    Diagnoses and all orders for this visit:    Long term current use of anticoagulant therapy    Onychomycosis due to dermatophyte    Peripheral vascular disease, unspecified      I counseled the patient on her conditions, their implications and medical management.        With the patient's permission, I debrided all ten toenails with a sterile nipper and curette, removing all offending nail and debris.  Patient tolerated the procedure well and related significant relief.      Discussed conservative treatment with shoes of adequate dimensions, material, and style to alleviate symptoms and delay or prevent surgical intervention.    penlac            Follow up if symptoms worsen or fail to improve.

## 2022-03-23 ENCOUNTER — TELEPHONE (OUTPATIENT)
Dept: INTERNAL MEDICINE | Facility: CLINIC | Age: 87
End: 2022-03-23
Payer: MEDICARE

## 2022-03-23 NOTE — TELEPHONE ENCOUNTER
----- Message from Karen Crockett sent at 3/23/2022 12:03 PM CDT -----  Contact: [phone]  Fox with [facility] is calling to get an order signed for this patient.Please advise

## 2022-03-23 NOTE — TELEPHONE ENCOUNTER
Fox with [facility] states pt needs home health  order [id number] signed Fox states order is on pt chart as do not sign but the order is needed to be signed

## 2022-05-16 ENCOUNTER — LAB VISIT (OUTPATIENT)
Dept: LAB | Facility: HOSPITAL | Age: 87
End: 2022-05-16
Attending: INTERNAL MEDICINE
Payer: MEDICARE

## 2022-05-16 ENCOUNTER — OFFICE VISIT (OUTPATIENT)
Dept: INTERNAL MEDICINE | Facility: CLINIC | Age: 87
End: 2022-05-16
Payer: MEDICARE

## 2022-05-16 VITALS
HEART RATE: 88 BPM | WEIGHT: 113.56 LBS | HEIGHT: 60 IN | SYSTOLIC BLOOD PRESSURE: 128 MMHG | OXYGEN SATURATION: 95 % | BODY MASS INDEX: 22.29 KG/M2 | DIASTOLIC BLOOD PRESSURE: 62 MMHG

## 2022-05-16 DIAGNOSIS — L98.9 SKIN LESIONS: ICD-10-CM

## 2022-05-16 DIAGNOSIS — E03.9 HYPOTHYROIDISM (ACQUIRED): ICD-10-CM

## 2022-05-16 DIAGNOSIS — Z86.718 HISTORY OF DVT (DEEP VEIN THROMBOSIS): ICD-10-CM

## 2022-05-16 DIAGNOSIS — Z79.01 CURRENT USE OF LONG TERM ANTICOAGULATION: ICD-10-CM

## 2022-05-16 DIAGNOSIS — E78.00 PURE HYPERCHOLESTEROLEMIA: ICD-10-CM

## 2022-05-16 DIAGNOSIS — I10 BENIGN HYPERTENSION: ICD-10-CM

## 2022-05-16 DIAGNOSIS — Z00.00 ANNUAL PHYSICAL EXAM: Primary | ICD-10-CM

## 2022-05-16 DIAGNOSIS — Z00.00 ANNUAL PHYSICAL EXAM: ICD-10-CM

## 2022-05-16 LAB
ALBUMIN SERPL BCP-MCNC: 3.2 G/DL (ref 3.5–5.2)
ALP SERPL-CCNC: 77 U/L (ref 55–135)
ALT SERPL W/O P-5'-P-CCNC: 11 U/L (ref 10–44)
ANION GAP SERPL CALC-SCNC: 11 MMOL/L (ref 8–16)
AST SERPL-CCNC: 16 U/L (ref 10–40)
BASOPHILS # BLD AUTO: 0.02 K/UL (ref 0–0.2)
BASOPHILS NFR BLD: 0.2 % (ref 0–1.9)
BILIRUB SERPL-MCNC: 1.9 MG/DL (ref 0.1–1)
BUN SERPL-MCNC: 15 MG/DL (ref 10–30)
CALCIUM SERPL-MCNC: 9.7 MG/DL (ref 8.7–10.5)
CHLORIDE SERPL-SCNC: 103 MMOL/L (ref 95–110)
CHOLEST SERPL-MCNC: 195 MG/DL (ref 120–199)
CHOLEST/HDLC SERPL: 2.2 {RATIO} (ref 2–5)
CO2 SERPL-SCNC: 25 MMOL/L (ref 23–29)
CREAT SERPL-MCNC: 0.6 MG/DL (ref 0.5–1.4)
DIFFERENTIAL METHOD: ABNORMAL
EOSINOPHIL # BLD AUTO: 0 K/UL (ref 0–0.5)
EOSINOPHIL NFR BLD: 0.3 % (ref 0–8)
ERYTHROCYTE [DISTWIDTH] IN BLOOD BY AUTOMATED COUNT: 13.7 % (ref 11.5–14.5)
EST. GFR  (AFRICAN AMERICAN): >60 ML/MIN/1.73 M^2
EST. GFR  (NON AFRICAN AMERICAN): >60 ML/MIN/1.73 M^2
GLUCOSE SERPL-MCNC: 93 MG/DL (ref 70–110)
HCT VFR BLD AUTO: 39.5 % (ref 37–48.5)
HDLC SERPL-MCNC: 89 MG/DL (ref 40–75)
HDLC SERPL: 45.6 % (ref 20–50)
HGB BLD-MCNC: 12.6 G/DL (ref 12–16)
IMM GRANULOCYTES # BLD AUTO: 0.04 K/UL (ref 0–0.04)
IMM GRANULOCYTES NFR BLD AUTO: 0.5 % (ref 0–0.5)
LDLC SERPL CALC-MCNC: 89 MG/DL (ref 63–159)
LYMPHOCYTES # BLD AUTO: 1 K/UL (ref 1–4.8)
LYMPHOCYTES NFR BLD: 11.4 % (ref 18–48)
MCH RBC QN AUTO: 29.7 PG (ref 27–31)
MCHC RBC AUTO-ENTMCNC: 31.9 G/DL (ref 32–36)
MCV RBC AUTO: 93 FL (ref 82–98)
MONOCYTES # BLD AUTO: 0.7 K/UL (ref 0.3–1)
MONOCYTES NFR BLD: 7.9 % (ref 4–15)
NEUTROPHILS # BLD AUTO: 6.9 K/UL (ref 1.8–7.7)
NEUTROPHILS NFR BLD: 79.7 % (ref 38–73)
NONHDLC SERPL-MCNC: 106 MG/DL
NRBC BLD-RTO: 0 /100 WBC
PLATELET # BLD AUTO: 230 K/UL (ref 150–450)
PMV BLD AUTO: 9.8 FL (ref 9.2–12.9)
POTASSIUM SERPL-SCNC: 5 MMOL/L (ref 3.5–5.1)
PROT SERPL-MCNC: 6.7 G/DL (ref 6–8.4)
RBC # BLD AUTO: 4.24 M/UL (ref 4–5.4)
SODIUM SERPL-SCNC: 139 MMOL/L (ref 136–145)
T4 FREE SERPL-MCNC: 0.83 NG/DL (ref 0.71–1.51)
TRIGL SERPL-MCNC: 85 MG/DL (ref 30–150)
TSH SERPL DL<=0.005 MIU/L-ACNC: 3.22 UIU/ML (ref 0.4–4)
WBC # BLD AUTO: 8.68 K/UL (ref 3.9–12.7)

## 2022-05-16 PROCEDURE — 3288F FALL RISK ASSESSMENT DOCD: CPT | Mod: CPTII,S$GLB,, | Performed by: INTERNAL MEDICINE

## 2022-05-16 PROCEDURE — 36415 COLL VENOUS BLD VENIPUNCTURE: CPT | Performed by: INTERNAL MEDICINE

## 2022-05-16 PROCEDURE — 99999 PR PBB SHADOW E&M-EST. PATIENT-LVL V: ICD-10-PCS | Mod: PBBFAC,,, | Performed by: INTERNAL MEDICINE

## 2022-05-16 PROCEDURE — 3288F PR FALLS RISK ASSESSMENT DOCUMENTED: ICD-10-PCS | Mod: CPTII,S$GLB,, | Performed by: INTERNAL MEDICINE

## 2022-05-16 PROCEDURE — 99397 PR PREVENTIVE VISIT,EST,65 & OVER: ICD-10-PCS | Mod: GY,S$GLB,, | Performed by: INTERNAL MEDICINE

## 2022-05-16 PROCEDURE — 80053 COMPREHEN METABOLIC PANEL: CPT | Performed by: INTERNAL MEDICINE

## 2022-05-16 PROCEDURE — 1159F MED LIST DOCD IN RCRD: CPT | Mod: CPTII,S$GLB,, | Performed by: INTERNAL MEDICINE

## 2022-05-16 PROCEDURE — 1159F PR MEDICATION LIST DOCUMENTED IN MEDICAL RECORD: ICD-10-PCS | Mod: CPTII,S$GLB,, | Performed by: INTERNAL MEDICINE

## 2022-05-16 PROCEDURE — 99999 PR PBB SHADOW E&M-EST. PATIENT-LVL V: CPT | Mod: PBBFAC,,, | Performed by: INTERNAL MEDICINE

## 2022-05-16 PROCEDURE — 1100F PTFALLS ASSESS-DOCD GE2>/YR: CPT | Mod: CPTII,S$GLB,, | Performed by: INTERNAL MEDICINE

## 2022-05-16 PROCEDURE — 1160F PR REVIEW ALL MEDS BY PRESCRIBER/CLIN PHARMACIST DOCUMENTED: ICD-10-PCS | Mod: CPTII,S$GLB,, | Performed by: INTERNAL MEDICINE

## 2022-05-16 PROCEDURE — 84443 ASSAY THYROID STIM HORMONE: CPT | Performed by: INTERNAL MEDICINE

## 2022-05-16 PROCEDURE — 1126F AMNT PAIN NOTED NONE PRSNT: CPT | Mod: CPTII,S$GLB,, | Performed by: INTERNAL MEDICINE

## 2022-05-16 PROCEDURE — 84439 ASSAY OF FREE THYROXINE: CPT | Performed by: INTERNAL MEDICINE

## 2022-05-16 PROCEDURE — 99397 PER PM REEVAL EST PAT 65+ YR: CPT | Mod: GY,S$GLB,, | Performed by: INTERNAL MEDICINE

## 2022-05-16 PROCEDURE — 85025 COMPLETE CBC W/AUTO DIFF WBC: CPT | Performed by: INTERNAL MEDICINE

## 2022-05-16 PROCEDURE — 1160F RVW MEDS BY RX/DR IN RCRD: CPT | Mod: CPTII,S$GLB,, | Performed by: INTERNAL MEDICINE

## 2022-05-16 PROCEDURE — 80061 LIPID PANEL: CPT | Performed by: INTERNAL MEDICINE

## 2022-05-16 PROCEDURE — 1126F PR PAIN SEVERITY QUANTIFIED, NO PAIN PRESENT: ICD-10-PCS | Mod: CPTII,S$GLB,, | Performed by: INTERNAL MEDICINE

## 2022-05-16 PROCEDURE — 1100F PR PT FALLS ASSESS DOC 2+ FALLS/FALL W/INJURY/YR: ICD-10-PCS | Mod: CPTII,S$GLB,, | Performed by: INTERNAL MEDICINE

## 2022-05-16 NOTE — PROGRESS NOTES
Subjective:       Patient ID: Nany Crenshaw is a 89 y.o. female.    Chief Complaint: Follow-up   this is a 89-year-old who presents today for follow-up.  She is brought in by her daughter reports that she has been doing fairly well at home she continues have some issues with chronic edema but relatively stable over time she does not do well with supportive stocking she does have some issues with feet and hammertoes and follows with Podiatry she reports they were talking about getting her some orthotics in the future.  She has had no recent falls since her fracture previously.  She does have some skin gross a large 1 on her right wrist would like a dermatology referral.    She lives at home but a niece stays  with her regularly.  She remains on Eliquis there clarify which dosage when she gets home continues to follow with her cardiologist    HPI  Review of Systems   Musculoskeletal:        Fluid retention  Varicose veins    Skin:        Skin growths  Large on right hand   Would like dermatolgoy referral        Objective:     Blood pressure 128/62, pulse 88, height 5' (1.524 m), weight 51.5 kg (113 lb 8.6 oz), SpO2 95 %.    Physical Exam  Constitutional:       General: She is not in acute distress.  HENT:      Head: Normocephalic.   Eyes:      General: No scleral icterus.  Cardiovascular:      Rate and Rhythm: Normal rate and regular rhythm.      Heart sounds: Normal heart sounds. No murmur heard.    No friction rub. No gallop.      Comments: Breast : normal no masses   Discomfort   Pulmonary:      Effort: Pulmonary effort is normal. No respiratory distress.      Breath sounds: Normal breath sounds.   Abdominal:      General: Bowel sounds are normal.      Palpations: Abdomen is soft. There is no mass.      Tenderness: There is no abdominal tenderness.   Musculoskeletal:      Cervical back: Neck supple.      Comments: Chronic venous insufficiency  Trace edema   varicsoe veins    Skin:     Findings: No erythema.    Neurological:      Mental Status: She is alert.         Assessment:       1. Annual physical exam    2. Benign hypertension    3. Skin lesions    4. Hypothyroidism (acquired)    5. Pure hypercholesterolemia    6. Current use of long term anticoagulation    7. History of DVT (deep vein thrombosis)        Plan:       Nany was seen today for follow-up.    Diagnoses and all orders for this visit:    Annual physical exam  -     CBC Auto Differential; Future  -     Comprehensive Metabolic Panel; Future  -     Lipid Panel; Future  -     TSH; Future  -     T4, Free; Future    Benign hypertension   blood pressure acceptable continue current regimen  -     CBC Auto Differential; Future  -     Comprehensive Metabolic Panel; Future  -     Lipid Panel; Future  -     TSH; Future  -     T4, Free; Future    Skin lesions  Growth  Dermatology referral   -     Ambulatory referral/consult to Dermatology; Future    Hypothyroidism (acquired)   she remains on medication will continue recheck labs  -     TSH; Future  -     T4, Free; Future    Pure hypercholesterolemia  Remains on simvastatin     Current use of long term anticoagulation  History of DVT (deep vein thrombosis)  Venous insuffiency   history of she continues to follow with cardiology discussed leg elevations report of stockings if tolerates    Labs and review   covid bosoter recommended    Follow up 6 months

## 2022-05-17 ENCOUNTER — TELEPHONE (OUTPATIENT)
Dept: INTERNAL MEDICINE | Facility: CLINIC | Age: 87
End: 2022-05-17
Payer: MEDICARE

## 2022-05-17 NOTE — TELEPHONE ENCOUNTER
Attempted to call pt back about lab results but no response at this time. Left VM to call office when available.

## 2022-06-01 ENCOUNTER — HOSPITAL ENCOUNTER (EMERGENCY)
Facility: HOSPITAL | Age: 87
Discharge: HOME | End: 2022-06-01
Attending: EMERGENCY MEDICINE
Payer: MEDICARE

## 2022-06-01 VITALS
BODY MASS INDEX: 24.75 KG/M2 | TEMPERATURE: 99 F | RESPIRATION RATE: 18 BRPM | DIASTOLIC BLOOD PRESSURE: 68 MMHG | HEIGHT: 56 IN | HEART RATE: 88 BPM | SYSTOLIC BLOOD PRESSURE: 146 MMHG | OXYGEN SATURATION: 98 % | WEIGHT: 110 LBS

## 2022-06-01 DIAGNOSIS — I82.402 ACUTE DEEP VEIN THROMBOSIS (DVT) OF LEFT LOWER EXTREMITY, UNSPECIFIED VEIN: Primary | ICD-10-CM

## 2022-06-01 DIAGNOSIS — M79.89 LEG SWELLING: ICD-10-CM

## 2022-06-01 LAB
ALBUMIN SERPL BCP-MCNC: 3.3 G/DL (ref 3.5–5.2)
ALP SERPL-CCNC: 85 U/L (ref 55–135)
ALT SERPL W/O P-5'-P-CCNC: 18 U/L (ref 10–44)
ANION GAP SERPL CALC-SCNC: 9 MMOL/L (ref 8–16)
AST SERPL-CCNC: 24 U/L (ref 10–40)
BASOPHILS # BLD AUTO: 0.04 K/UL (ref 0–0.2)
BASOPHILS NFR BLD: 0.6 % (ref 0–1.9)
BILIRUB SERPL-MCNC: 1 MG/DL (ref 0.1–1)
BUN SERPL-MCNC: 13 MG/DL (ref 10–30)
CALCIUM SERPL-MCNC: 9.3 MG/DL (ref 8.7–10.5)
CHLORIDE SERPL-SCNC: 103 MMOL/L (ref 95–110)
CO2 SERPL-SCNC: 26 MMOL/L (ref 23–29)
CREAT SERPL-MCNC: 0.6 MG/DL (ref 0.5–1.4)
DIFFERENTIAL METHOD: ABNORMAL
EOSINOPHIL # BLD AUTO: 0.1 K/UL (ref 0–0.5)
EOSINOPHIL NFR BLD: 0.9 % (ref 0–8)
ERYTHROCYTE [DISTWIDTH] IN BLOOD BY AUTOMATED COUNT: 13.2 % (ref 11.5–14.5)
EST. GFR  (AFRICAN AMERICAN): >60 ML/MIN/1.73 M^2
EST. GFR  (NON AFRICAN AMERICAN): >60 ML/MIN/1.73 M^2
GLUCOSE SERPL-MCNC: 89 MG/DL (ref 70–110)
HCT VFR BLD AUTO: 39.8 % (ref 37–48.5)
HGB BLD-MCNC: 12.6 G/DL (ref 12–16)
IMM GRANULOCYTES # BLD AUTO: 0.02 K/UL (ref 0–0.04)
IMM GRANULOCYTES NFR BLD AUTO: 0.3 % (ref 0–0.5)
LYMPHOCYTES # BLD AUTO: 1.3 K/UL (ref 1–4.8)
LYMPHOCYTES NFR BLD: 18.9 % (ref 18–48)
MCH RBC QN AUTO: 29.4 PG (ref 27–31)
MCHC RBC AUTO-ENTMCNC: 31.7 G/DL (ref 32–36)
MCV RBC AUTO: 93 FL (ref 82–98)
MONOCYTES # BLD AUTO: 0.4 K/UL (ref 0.3–1)
MONOCYTES NFR BLD: 6.4 % (ref 4–15)
NEUTROPHILS # BLD AUTO: 4.9 K/UL (ref 1.8–7.7)
NEUTROPHILS NFR BLD: 72.9 % (ref 38–73)
NRBC BLD-RTO: 0 /100 WBC
PLATELET # BLD AUTO: 213 K/UL (ref 150–450)
PMV BLD AUTO: 8.9 FL (ref 9.2–12.9)
POTASSIUM SERPL-SCNC: 4.4 MMOL/L (ref 3.5–5.1)
PROT SERPL-MCNC: 6.5 G/DL (ref 6–8.4)
RBC # BLD AUTO: 4.29 M/UL (ref 4–5.4)
SODIUM SERPL-SCNC: 138 MMOL/L (ref 136–145)
WBC # BLD AUTO: 6.71 K/UL (ref 3.9–12.7)

## 2022-06-01 PROCEDURE — 85025 COMPLETE CBC W/AUTO DIFF WBC: CPT | Performed by: EMERGENCY MEDICINE

## 2022-06-01 PROCEDURE — 96372 THER/PROPH/DIAG INJ SC/IM: CPT | Performed by: EMERGENCY MEDICINE

## 2022-06-01 PROCEDURE — 63600175 PHARM REV CODE 636 W HCPCS: Performed by: EMERGENCY MEDICINE

## 2022-06-01 PROCEDURE — 80053 COMPREHEN METABOLIC PANEL: CPT | Performed by: EMERGENCY MEDICINE

## 2022-06-01 PROCEDURE — 99284 EMERGENCY DEPT VISIT MOD MDM: CPT | Mod: 25

## 2022-06-01 RX ORDER — ENOXAPARIN SODIUM 100 MG/ML
1 INJECTION SUBCUTANEOUS
Status: COMPLETED | OUTPATIENT
Start: 2022-06-01 | End: 2022-06-01

## 2022-06-01 RX ADMIN — ENOXAPARIN SODIUM 50 MG: 100 INJECTION SUBCUTANEOUS at 07:06

## 2022-06-01 NOTE — ED PROVIDER NOTES
"Encounter Date: 6/1/2022    SCRIBE #1 NOTE: I, Sharon Luz Maria, am scribing for, and in the presence of, Denisha Hilario MD.       History     Chief Complaint   Patient presents with    Leg Swelling     Patient's daughter reports that she noticed swelling to patient left lower leg and foot today. She reports that patient has a hx of DVT to the right leg. Patient denies sob, chest pain. Reports mild lightheadedness.      Ms Crenshaw is a 89 y.o. female, with a PMHx of CAD, HTN, Hypothyroidism, DVT, who presents to the ED with new left leg swelling. Daughter reports the patient's had a right lower extremity DVT for "months", but noticed today that her left lower extremity is swollen, red, and blistering, prompting ED arrival. She adds that her left foot is painful to touch. Patient has been compliant with Eliquis 2.5 mg BID. No other exacerbating or alleviating factors. Denies cough, shortness of breath, chest pain, abdominal pain, dysuria, or other associated symptoms. No recent trauma or falls. Patient uses a walker and is able to ambulate and bear weight today. Of note her Cardiologist is Dr Benitez.    Patient is Singaporean-speaking and her daughter declined  services and preferred to translate herself.     The history is provided by the patient and a significant other (daughter). The history is limited by a language barrier (Singaporean speaking). A  was used.     Review of patient's allergies indicates:   Allergen Reactions    Bactrim [sulfamethoxazole-trimethoprim] Nausea And Vomiting     Past Medical History:   Diagnosis Date    Colon polyp     no further colon needed    Coronary artery disease     Diverticulosis     Fracture of right distal radius     GERD (gastroesophageal reflux disease)     Hypertension     Hypothyroidism (acquired)     Osteoporosis, post-menopausal     Scoliosis      Past Surgical History:   Procedure Laterality Date    ADENOIDECTOMY      CATARACT EXTRACTION   "    ou     SECTION      CHOLECYSTECTOMY      EYE SURGERY  2014    ptosis surgery      HYSTERECTOMY      yag cap      od     Family History   Problem Relation Age of Onset    Cancer Mother         skin cancer     Dementia Mother     Hypertension Mother     Heart disease Brother     Cancer Brother         lung cancer     No Known Problems Father     No Known Problems Sister     No Known Problems Maternal Aunt     No Known Problems Maternal Uncle     No Known Problems Paternal Aunt     No Known Problems Paternal Uncle     No Known Problems Maternal Grandmother     No Known Problems Maternal Grandfather     No Known Problems Paternal Grandmother     No Known Problems Paternal Grandfather     Amblyopia Neg Hx     Blindness Neg Hx     Cataracts Neg Hx     Diabetes Neg Hx     Glaucoma Neg Hx     Macular degeneration Neg Hx     Retinal detachment Neg Hx     Strabismus Neg Hx     Stroke Neg Hx     Thyroid disease Neg Hx     Melanoma Neg Hx      Social History     Tobacco Use    Smoking status: Never Smoker    Smokeless tobacco: Never Used   Substance Use Topics    Alcohol use: Yes     Alcohol/week: 6.0 standard drinks     Types: 6 Glasses of wine per week     Comment: occasional /social    Drug use: No     Review of Systems   Respiratory: Negative for cough and shortness of breath.    Cardiovascular: Positive for leg swelling (LLE). Negative for chest pain.   Gastrointestinal: Negative for abdominal pain.   Genitourinary: Negative for dysuria.   Musculoskeletal: Positive for arthralgias (+Left foot). Negative for gait problem.   Skin: Positive for color change (Redness, blisters to LLE).   All other systems reviewed and are negative.      Physical Exam     Initial Vitals [22 1529]   BP Pulse Resp Temp SpO2   (!) 153/65 64 18 97.7 °F (36.5 °C) 97 %      MAP       --         Physical Exam    Nursing note and vitals reviewed.  Constitutional: She appears well-developed and  well-nourished. She is not diaphoretic. No distress.   HENT:   Head: Normocephalic and atraumatic.   Eyes: EOM are normal.   Neck:   Normal range of motion.  Cardiovascular: Normal rate, regular rhythm and normal heart sounds.   No murmur heard.  Pulmonary/Chest: Breath sounds normal. No respiratory distress. She has no wheezes. She has no rhonchi. She has no rales.   Abdominal: Abdomen is soft. Bowel sounds are normal. She exhibits no distension and no mass. There is no abdominal tenderness. There is no guarding.   Musculoskeletal:         General: Edema (LLE) present. No tenderness. Normal range of motion.      Cervical back: Normal range of motion.     Neurological: She is oriented to person, place, and time. GCS eye subscore is 4. GCS verbal subscore is 5. GCS motor subscore is 6.   Skin: Skin is warm and dry.   LLE: very mild diffuse erythema with some swelling and taut skin from the proximal shin distally to the ankle. Good perfusion of the foot. Small, clear unopened blister to the posterior aspect of the distal shin. Lesser small blister over the shin.    Psychiatric: She has a normal mood and affect. Her behavior is normal. Judgment and thought content normal.         ED Course   Procedures  Labs Reviewed   CBC W/ AUTO DIFFERENTIAL - Abnormal; Notable for the following components:       Result Value    MCHC 31.7 (*)     MPV 8.9 (*)     All other components within normal limits   COMPREHENSIVE METABOLIC PANEL - Abnormal; Notable for the following components:    Albumin 3.3 (*)     All other components within normal limits          Imaging Results           US Lower Extremity Veins Left (Final result)  Result time 06/01/22 18:37:25    Final result by Yassine Banks MD (06/01/22 18:37:25)                 Impression:      Extensive left lower extremity deep venous thrombosis, as above.    This report was flagged in Epic as abnormal.      Electronically signed by: Yassine Banks  MD  Date:    06/01/2022  Time:    18:37             Narrative:    EXAMINATION:  US LOWER EXTREMITY VEINS LEFT    CLINICAL HISTORY:  Other specified soft tissue disorders    TECHNIQUE:  Duplex and color flow Doppler evaluation of the left lower extremity veins was performed.    COMPARISON:  November 2017.    FINDINGS:  Thrombosis is seen within the left iliac, common femoral, femoral, popliteal, and peroneal veins.  No evidence of clot involving the right common femoral vein or left greater saphenous and anterior and posterior tibial veins.                                 Medications   enoxaparin injection 50 mg (50 mg Subcutaneous Given 6/1/22 1939)     Medical Decision Making:   History:   Old Medical Records: I decided to obtain old medical records.  Clinical Tests:   Lab Tests: Ordered and Reviewed  Radiological Study: Ordered and Reviewed  ED Management:  Ms Crenshaw has been stable during her time in the ER.   Labs noted, wbc wnl, labwork is acutely reassuring.   Venous doppler performed, returned. Pt with extensive DVT in LLE.   I do not have clinical suspicion of infection at this time. abx not needed at this time.   Per daughter, pt has very good compliance with eliquis 2.5mg pobid.   Called cardiology, spoke w Dr. Madrid. Recommendation for dvt despite good compliance w appropriate dose of eliquis is lovenox.   I went to room to discuss and daughter is putting pt in wheelchair, reports pt adamant about leaving, is anxious and wants to go home.   Was able to redirect a bit, long enough to allow lovenox dose x1. Pt adamant about leaving now.   Will need lovenox 1mg/kg bid for at least a month and will need cardiology follow up. rec is to switch to lovenox bid b/c eliquis is not working. Pt is on correct dose of eliquis based upon size and age. Advised increasing dose is not cardiology's recommendation.   Daughter voiced good understanding but pt still adamant to leave. Discussed risks.   Will need training and  home health set up.   Daughter will return in early am with pt for next dose and SW consult to help arrange outpt f/u, home health, etc. Will need to get rx at that time.             Scribe Attestation:   Scribe #1: I performed the above scribed service and the documentation accurately describes the services I performed. I attest to the accuracy of the note.                 Clinical Impression:   Final diagnoses:  [M79.89] Leg swelling  [I82.402] Acute deep vein thrombosis (DVT) of left lower extremity, unspecified vein (Primary)          ED Disposition Condition    Discharge Stable       I, Denisha Hilario MD, personally performed the services described in this documentation. All medical record entries made by the scribe were at my direction and in my presence. I have reviewed the chart and agree that the record reflects my personal performance and is accurate and complete.    ED Prescriptions     None        Follow-up Information     Follow up With Specialties Details Why Contact [facility] - Emergency Dept Emergency Medicine  in am for further care [address] [facility] [id number] [phone]           Denisha Hilario MD  06/02/22 0116

## 2022-06-02 ENCOUNTER — HOSPITAL ENCOUNTER (EMERGENCY)
Facility: HOSPITAL | Age: 87
Discharge: HOME | End: 2022-06-02
Attending: EMERGENCY MEDICINE
Payer: MEDICARE

## 2022-06-02 VITALS
BODY MASS INDEX: 24.75 KG/M2 | TEMPERATURE: 98 F | SYSTOLIC BLOOD PRESSURE: 148 MMHG | HEART RATE: 85 BPM | HEIGHT: 56 IN | WEIGHT: 110 LBS | DIASTOLIC BLOOD PRESSURE: 66 MMHG | RESPIRATION RATE: 20 BRPM | OXYGEN SATURATION: 95 %

## 2022-06-02 DIAGNOSIS — O22.30 DVT (DEEP VEIN THROMBOSIS) IN PREGNANCY: Primary | ICD-10-CM

## 2022-06-02 DIAGNOSIS — I73.9 PERIPHERAL VASCULAR DISEASE, UNSPECIFIED: ICD-10-CM

## 2022-06-02 PROCEDURE — 99284 EMERGENCY DEPT VISIT MOD MDM: CPT | Mod: 25

## 2022-06-02 PROCEDURE — 96372 THER/PROPH/DIAG INJ SC/IM: CPT | Performed by: EMERGENCY MEDICINE

## 2022-06-02 PROCEDURE — 63600175 PHARM REV CODE 636 W HCPCS: Performed by: EMERGENCY MEDICINE

## 2022-06-02 RX ORDER — ENOXAPARIN SODIUM 100 MG/ML
1 INJECTION SUBCUTANEOUS 2 TIMES DAILY
Qty: 30 ML | Refills: 2 | Status: SHIPPED | OUTPATIENT
Start: 2022-06-02 | End: 2022-06-22 | Stop reason: SDUPTHER

## 2022-06-02 RX ORDER — ENOXAPARIN SODIUM 100 MG/ML
1 INJECTION SUBCUTANEOUS 2 TIMES DAILY
Qty: 30 ML | Refills: 2 | Status: SHIPPED | OUTPATIENT
Start: 2022-06-02 | End: 2022-06-02 | Stop reason: SDUPTHER

## 2022-06-02 RX ORDER — ENOXAPARIN SODIUM 100 MG/ML
1 INJECTION SUBCUTANEOUS
Status: COMPLETED | OUTPATIENT
Start: 2022-06-02 | End: 2022-06-02

## 2022-06-02 RX ORDER — SIMVASTATIN 40 MG/1
TABLET, FILM COATED ORAL
Qty: 90 TABLET | Refills: 1 | Status: SHIPPED | OUTPATIENT
Start: 2022-06-02 | End: 2023-04-10

## 2022-06-02 RX ADMIN — ENOXAPARIN SODIUM 50 MG: 100 INJECTION SUBCUTANEOUS at 07:06

## 2022-06-02 NOTE — ED NOTES
Pt's daughter was trained on how to give the lovenox shot. Then the pt's daughter demonstrated how to waste the medication to equal 50mg and how to administer the medication. Pt's daughter performed the task and pt. Tolerated well.

## 2022-06-02 NOTE — PLAN OF CARE
[facility] - Emergency Dept  Discharge Assessment    Primary Care Provider: Ivette Mcdaniel MD     Discharge Assessment (most recent)       BRIEF DISCHARGE ASSESSMENT - 06/02/22 0844          Discharge Planning    Assessment Type Discharge Planning Assessment     Resource/Environmental Concerns none     Support Systems Family members     Equipment Currently Used at Home walker, rolling;wheelchair     Current Living Arrangements home/apartment/condo     Patient/Family Anticipates Transition to home with family     Patient/Family Anticipated Services at Transition home health care     DME Needed Upon Discharge  none     Discharge Plan A Home Health;Home with family     Discharge Plan B Home Health;Home with family                   PEACE spoke with patient's daughter via phone. Patient's daughter informed PEACE that patient had [facility] previous and would like to have the same [facility] again.     PEACE secure chat patient's nurse to have [facility] order an ambulatory home health referral.    Patient's nurse called PEACE informing PEACE that she will have [facility] place [facility] order.

## 2022-06-02 NOTE — TELEPHONE ENCOUNTER
No new care gaps identified.  [facility] Embedded Care Gaps. Reference number: [id number]. 6/02/2022   6:06:47 AM JOHANNYT

## 2022-06-02 NOTE — ED PROVIDER NOTES
Encounter Date: 6/2/2022    SCRIBE #1 NOTE: I, Denice Long, am scribing for, and in the presence of,  Lamont Gomez Jr., MD. I have scribed the following portions of the note - Other sections scribed: HPI, ROS.       History     Chief Complaint   Patient presents with    OTHER     89 yo female to triage for follow up and 2nd injection. Pt was here on yesterday and dx w/ DVT to the leg. She was told to come back this morning for a second injection and to be set up w/ home health. VSS, NAD, AAOx4     CC: Lovenox 2nd injection     HPI: This is a 89 y.o.female patient, with a PMHx of DVT, CAD and HTN , presenting to the ED for follow up from her ED visit yesterday regarding swelling of the left leg. Patient's daughter reports patient was here yesterday and diagnosed with DVT to her left leg. Daughter reports patient was told to come back this morning for her 2nd Lovenox injection and to be set up with home health. Daughter reports the patient's leg swelling has decreased and has gotten better since the administration of the first injection last night in the ED. Patient denies any other complications. Patient denies any history of blood clots in the lungs. Patient denies any shortness of breath, chest pain, vomiting, or any other associated symptoms. No known drug allergies.      The history is provided by the patient and a significant other (daughter). No  was used.     Review of patient's allergies indicates:   Allergen Reactions    Bactrim [sulfamethoxazole-trimethoprim] Nausea And Vomiting     Past Medical History:   Diagnosis Date    Colon polyp     no further colon needed    Coronary artery disease     Diverticulosis     Fracture of right distal radius     GERD (gastroesophageal reflux disease)     Hypertension     Hypothyroidism (acquired)     Osteoporosis, post-menopausal     Scoliosis      Past Surgical History:   Procedure Laterality Date    ADENOIDECTOMY      CATARACT  EXTRACTION      ou     SECTION      CHOLECYSTECTOMY      EYE SURGERY  2014    ptosis surgery      HYSTERECTOMY      yag cap      od     Family History   Problem Relation Age of Onset    Cancer Mother         skin cancer     Dementia Mother     Hypertension Mother     Heart disease Brother     Cancer Brother         lung cancer     No Known Problems Father     No Known Problems Sister     No Known Problems Maternal Aunt     No Known Problems Maternal Uncle     No Known Problems Paternal Aunt     No Known Problems Paternal Uncle     No Known Problems Maternal Grandmother     No Known Problems Maternal Grandfather     No Known Problems Paternal Grandmother     No Known Problems Paternal Grandfather     Amblyopia Neg Hx     Blindness Neg Hx     Cataracts Neg Hx     Diabetes Neg Hx     Glaucoma Neg Hx     Macular degeneration Neg Hx     Retinal detachment Neg Hx     Strabismus Neg Hx     Stroke Neg Hx     Thyroid disease Neg Hx     Melanoma Neg Hx      Social History     Tobacco Use    Smoking status: Never Smoker    Smokeless tobacco: Never Used   Substance Use Topics    Alcohol use: Yes     Alcohol/week: 6.0 standard drinks     Types: 6 Glasses of wine per week     Comment: occasional /social    Drug use: No     Review of Systems   Constitutional: Negative for fever.   HENT: Negative for congestion, sore throat and trouble swallowing.    Respiratory: Negative for cough and shortness of breath.    Cardiovascular: Negative for chest pain.        (+) left leg swelling   Gastrointestinal: Negative for abdominal pain, constipation, diarrhea, nausea and vomiting.   Genitourinary: Negative for dysuria, flank pain, frequency and urgency.   Musculoskeletal: Negative for back pain.   Skin: Negative for rash.   Neurological: Negative for headaches.   All other systems reviewed and are negative.      Physical Exam     Initial Vitals [22 0718]   BP Pulse Resp Temp SpO2   (!) 148/66  85 20 98.2 °F (36.8 °C) 95 %      MAP       --         Physical Exam    Nursing note and vitals reviewed.  Constitutional: She appears well-developed and well-nourished. She is not diaphoretic. No distress.   HENT:   Head: Normocephalic and atraumatic.   Right Ear: External ear normal.   Left Ear: External ear normal.   Nose: Nose normal.   Mouth/Throat: Oropharynx is clear and moist.   Eyes: Conjunctivae and EOM are normal. Pupils are equal, round, and reactive to light. Right eye exhibits no discharge. Left eye exhibits no discharge. No scleral icterus.   Neck: Neck supple.   Normal range of motion.  Cardiovascular: Normal rate, regular rhythm, normal heart sounds and intact distal pulses.   No murmur heard.  Pulmonary/Chest: Breath sounds normal. No respiratory distress. She has no wheezes. She has no rhonchi. She has no rales.   Musculoskeletal:         General: Edema present. No tenderness. Normal range of motion.      Cervical back: Normal range of motion and neck supple.      Comments: Patient has erythema and mild edema to the bilateral lower extremities.  Lower extremities roughly symmetric.  No significant tenderness to palpation bilaterally.     Neurological: She is alert and oriented to person, place, and time. She has normal strength. No cranial nerve deficit or sensory deficit.   Skin: Skin is warm and dry. No rash noted. No erythema. No pallor.   Psychiatric: She has a normal mood and affect. Her behavior is normal. Judgment and thought content normal.         ED Course   Procedures  Labs Reviewed - No data to display       Imaging Results    None          Medications   enoxaparin injection 50 mg (50 mg Subcutaneous Given 6/2/22 3540)     Medical Decision Making:   ED Management:  This is the emergent evaluation of a 89-year-old female on apixaban with right-sided DVT now diagnosed with left-sided DVT yesterday.  Case was discussed with Dr. Madrid who recommended starting BID Lovenox.  Patient did not  want to stay for further assistance with teaching and setting up of home health.  She is here today with her daughter who lives 1 block away from her and primarily takes care of her today to get teaching on the injections as well as a prescription, close follow-up, and be evaluated by social Work for possible home health assistance.  Patient is denying any chest pain or shortness of breath.  Daughter feels that left leg appearance has improved since yesterday.    This patient again asked to leave the emergency department.  Fortunately, social Work was able to do an assessment over the phone with the patient's daughter while she was in the emergency department.  They asked to place an order for ambulatory home health which I did.  Additionally, I have prescribed the patient 1 milligram/kilogram twice daily (50 mg BID) Lovenox take at home.  The patient's daughter has been instructed on how to give this medication subcutaneously.  Advised return for new or worsening symptoms such as significant pain in the lower extremities, significant discoloration of the lower extremities, or any chest pain or shortness of breath.  Patient's daughter and patient have been instructed to stop taking apixaban.    10:27 AM received a call from [facility]'s pharmacy.  They only have 60 mg or 40 mg prefilled enoxaparin syringes.  Given this patient's normal renal function and failure of treatment with apixaban, I have changed the dose to 60 mg twice daily.          Scribe Attestation:   Scribe #1: I performed the above scribed service and the documentation accurately describes the services I performed. I attest to the accuracy of the note.                 Clinical Impression:   Final diagnoses:  [O22.30] DVT (deep vein thrombosis) in pregnancy (Primary)  [I73.9] Peripheral vascular disease, unspecified          ED Disposition Condition    Discharge Stable        ED Prescriptions     Medication Sig Dispense Start Date End Date Auth.  Provider    enoxaparin (LOVENOX) 100 mg/mL Syrg  (Status: Discontinued) Inject 0.5 mLs (50 mg total) into the skin 2 (two) times a day. 30 mL 6/2/2022 6/2/2022 Lamont Gomez Jr., MD    enoxaparin (LOVENOX) 100 mg/mL Syrg Inject 0.5 mLs (50 mg total) into the skin 2 (two) times a day. 30 mL 6/2/2022 7/2/2022 Lamont Gomez Jr., MD        Follow-up Information     Follow up With Specialties Details Why Contact Info    Bradley Madrid MD Cardiology, Interventional Cardiology Schedule an appointment as soon as possible for a visit in 1 week  [address]  [address]  [place] [id number] [phone]      Shana Maciel MD Hematology and Oncology, Hematology Schedule an appointment as soon as possible for a visit in 1 week  [address]  [address]  [place] [address]  [phone]           I, Lamont Gomez MD, personally performed the services described in this documentation. All medical record entries made by the scribe were at my direction and in my presence. I have reviewed the chart and agree that the record reflects my personal performance and is accurate and complete.       Lamont Gomez Jr., MD  06/02/22 5128       Lamont Gomez Jr., MD  06/02/22 5004

## 2022-06-02 NOTE — TELEPHONE ENCOUNTER
Refill Routing Note   Medication(s) are not appropriate for processing by [facility] for the following reason(s):      - Patient has been seen in the ED/Hospital since the last PCP visit    [facility] action(s):  Defer          Medication reconciliation completed: No     Appointments  past 12m or future 3m with PCP    Date Provider   Last Visit   5/16/2022 Ivette Mcdaniel MD   Next Visit   Visit date not found Ivette Mcdaniel MD   ED visits in past 90 days: 2        Note composed:12:33 PM 06/02/2022

## 2022-06-02 NOTE — ED TRIAGE NOTES
Patient arrived to ER via personal transport w daughter at bedside w CC: bilat leg pain for 24 hours. Patient Serbian speaking only and both she and her daughter are rather strong willed re: IV vs straight stick, where RN can stick etc. No meds PTA. Denied: CP, HA,SOB.

## 2022-06-02 NOTE — CONSULTS
PEACE spoke with Zoe ([facility]) re:  referral. Patient scheduled to be seen on Monday.

## 2022-06-02 NOTE — ED TRIAGE NOTES
Pt. Was recently diagnosed with a DVT. Pt. Was brought in for a lovenox shot. Pt's daughter is here for training on how to give the lovenox shot and  services for home healthcare to be set up at the house.

## 2022-06-02 NOTE — DISCHARGE INSTRUCTIONS
Return early tomorrow morning as discussed for the next dose of lovenox and to set up your home health and further education.

## 2022-06-02 NOTE — DISCHARGE INSTRUCTIONS
Please take medications as prescribed.  Home health has been ordered.  Please return for new or worsening symptoms such as worsening appearance of leg, severe uncontrolled pain, chest pain, or shortness of breath.  Please schedule follow-up appointments with cardiology and hematology as discussed.  Stop apixaban (Eliquis).

## 2022-06-02 NOTE — PLAN OF CARE
[facility] - Emergency Dept  Discharge Final Note    Primary Care Provider: Ivette Mcdaniel MD    Expected Discharge Date:     Final Discharge Note (most recent)       Final Note - 06/02/22 0945          Final Note    Assessment Type Final Discharge Note     Anticipated Discharge Disposition Home-Health Care [facility]     What phone number can be called within the next 1-3 days to see how you are doing after discharge? [phone]     Hospital Resources/Appts/Education Provided Provided patient/caregiver with written discharge plan information        Post-Acute Status    Post-Acute Authorization Home Health     Home Health Status Set-up Complete/Auth obtained   [facility]    Discharge Delays None known at this time                     Important Message from Medicare             Contact Info       Bradley Madrid MD   Specialty: Cardiology, Interventional Cardiology    [address]  [address]  [place] [address]   Phone: [phone]       Next Steps: Schedule an appointment as soon as possible for a visit in 1 week(s)    Shana Maciel MD   Specialty: Hematology and Oncology, Hematology    [address]  [address]  [place] [address]   Phone: [phone]       Next Steps: Schedule an appointment as soon as possible for a visit in 1 week(s)

## 2022-06-04 ENCOUNTER — PATIENT MESSAGE (OUTPATIENT)
Dept: INTERNAL MEDICINE | Facility: CLINIC | Age: 87
End: 2022-06-04
Payer: MEDICARE

## 2022-06-06 ENCOUNTER — LAB VISIT (OUTPATIENT)
Dept: LAB | Facility: HOSPITAL | Age: 87
End: 2022-06-06
Attending: EMERGENCY MEDICINE
Payer: MEDICARE

## 2022-06-06 DIAGNOSIS — I82.402 DEEP VEIN THROMBOSIS OF LEFT LOWER LIMB: Primary | ICD-10-CM

## 2022-06-06 LAB
BASOPHILS # BLD AUTO: 0.05 K/UL (ref 0–0.2)
BASOPHILS NFR BLD: 0.8 % (ref 0–1.9)
DIFFERENTIAL METHOD: ABNORMAL
EOSINOPHIL # BLD AUTO: 0 K/UL (ref 0–0.5)
EOSINOPHIL NFR BLD: 0.3 % (ref 0–8)
ERYTHROCYTE [DISTWIDTH] IN BLOOD BY AUTOMATED COUNT: 13.5 % (ref 11.5–14.5)
HCT VFR BLD AUTO: 39.4 % (ref 37–48.5)
HGB BLD-MCNC: 12.7 G/DL (ref 12–16)
IMM GRANULOCYTES # BLD AUTO: 0.02 K/UL (ref 0–0.04)
IMM GRANULOCYTES NFR BLD AUTO: 0.3 % (ref 0–0.5)
LYMPHOCYTES # BLD AUTO: 1 K/UL (ref 1–4.8)
LYMPHOCYTES NFR BLD: 17.2 % (ref 18–48)
MCH RBC QN AUTO: 29.3 PG (ref 27–31)
MCHC RBC AUTO-ENTMCNC: 32.2 G/DL (ref 32–36)
MCV RBC AUTO: 91 FL (ref 82–98)
MONOCYTES # BLD AUTO: 0.4 K/UL (ref 0.3–1)
MONOCYTES NFR BLD: 6.4 % (ref 4–15)
NEUTROPHILS # BLD AUTO: 4.4 K/UL (ref 1.8–7.7)
NEUTROPHILS NFR BLD: 75 % (ref 38–73)
NRBC BLD-RTO: 0 /100 WBC
PLATELET # BLD AUTO: 270 K/UL (ref 150–450)
PMV BLD AUTO: 9.7 FL (ref 9.2–12.9)
RBC # BLD AUTO: 4.33 M/UL (ref 4–5.4)
WBC # BLD AUTO: 5.93 K/UL (ref 3.9–12.7)

## 2022-06-06 PROCEDURE — 85025 COMPLETE CBC W/AUTO DIFF WBC: CPT | Performed by: EMERGENCY MEDICINE

## 2022-06-07 ENCOUNTER — TELEPHONE (OUTPATIENT)
Dept: INTERNAL MEDICINE | Facility: CLINIC | Age: 87
End: 2022-06-07
Payer: MEDICARE

## 2022-06-07 NOTE — TELEPHONE ENCOUNTER
----- Message from Bayleerajeev Call sent at 6/7/2022  3:18 PM CDT -----  Contact: Pt Mobile [phone]  Patient is returning a phone call.  Who left a message for the patient:   Does patient know what this is regarding:    Would you like a call back, or a response through your [facility] portal?:   Call   Comments:  Patients daughter Ms. Damon said that she received a call in regards to her mother on today and she would like a call back please.

## 2022-06-07 NOTE — TELEPHONE ENCOUNTER
Called and spoke with daughter  She has home health now on lovenox  Due to failure with eliquis  Has plan for hematology and cardiology follow up as well   Questions answered

## 2022-06-16 ENCOUNTER — PATIENT MESSAGE (OUTPATIENT)
Dept: CARDIOLOGY | Facility: CLINIC | Age: 87
End: 2022-06-16
Payer: MEDICARE

## 2022-06-17 ENCOUNTER — PATIENT MESSAGE (OUTPATIENT)
Dept: CARDIOLOGY | Facility: CLINIC | Age: 87
End: 2022-06-17
Payer: MEDICARE

## 2022-06-22 ENCOUNTER — OFFICE VISIT (OUTPATIENT)
Dept: CARDIOLOGY | Facility: CLINIC | Age: 87
End: 2022-06-22
Payer: MEDICARE

## 2022-06-22 VITALS
HEART RATE: 91 BPM | HEIGHT: 56 IN | DIASTOLIC BLOOD PRESSURE: 55 MMHG | SYSTOLIC BLOOD PRESSURE: 121 MMHG | BODY MASS INDEX: 24.75 KG/M2 | OXYGEN SATURATION: 95 % | WEIGHT: 110 LBS | RESPIRATION RATE: 18 BRPM

## 2022-06-22 DIAGNOSIS — R42 DIZZINESS: Primary | ICD-10-CM

## 2022-06-22 DIAGNOSIS — I82.421 DEEP VEIN THROMBOSIS (DVT) OF ILIAC VEIN OF RIGHT LOWER EXTREMITY, UNSPECIFIED CHRONICITY: ICD-10-CM

## 2022-06-22 DIAGNOSIS — I10 BENIGN HYPERTENSION: ICD-10-CM

## 2022-06-22 DIAGNOSIS — I25.10 CORONARY ARTERY DISEASE INVOLVING NATIVE CORONARY ARTERY OF NATIVE HEART WITHOUT ANGINA PECTORIS: ICD-10-CM

## 2022-06-22 DIAGNOSIS — E78.5 DYSLIPIDEMIA: ICD-10-CM

## 2022-06-22 PROCEDURE — 99999 PR PBB SHADOW E&M-EST. PATIENT-LVL IV: CPT | Mod: PBBFAC,,, | Performed by: INTERNAL MEDICINE

## 2022-06-22 PROCEDURE — 3288F FALL RISK ASSESSMENT DOCD: CPT | Mod: CPTII,S$GLB,, | Performed by: INTERNAL MEDICINE

## 2022-06-22 PROCEDURE — 93000 ELECTROCARDIOGRAM COMPLETE: CPT | Mod: S$GLB,,, | Performed by: INTERNAL MEDICINE

## 2022-06-22 PROCEDURE — 1126F PR PAIN SEVERITY QUANTIFIED, NO PAIN PRESENT: ICD-10-PCS | Mod: CPTII,S$GLB,, | Performed by: INTERNAL MEDICINE

## 2022-06-22 PROCEDURE — 1101F PT FALLS ASSESS-DOCD LE1/YR: CPT | Mod: CPTII,S$GLB,, | Performed by: INTERNAL MEDICINE

## 2022-06-22 PROCEDURE — 99999 PR PBB SHADOW E&M-EST. PATIENT-LVL IV: ICD-10-PCS | Mod: PBBFAC,,, | Performed by: INTERNAL MEDICINE

## 2022-06-22 PROCEDURE — 1159F PR MEDICATION LIST DOCUMENTED IN MEDICAL RECORD: ICD-10-PCS | Mod: CPTII,S$GLB,, | Performed by: INTERNAL MEDICINE

## 2022-06-22 PROCEDURE — 93000 EKG 12-LEAD: ICD-10-PCS | Mod: S$GLB,,, | Performed by: INTERNAL MEDICINE

## 2022-06-22 PROCEDURE — 1159F MED LIST DOCD IN RCRD: CPT | Mod: CPTII,S$GLB,, | Performed by: INTERNAL MEDICINE

## 2022-06-22 PROCEDURE — 99214 OFFICE O/P EST MOD 30 MIN: CPT | Mod: S$GLB,,, | Performed by: INTERNAL MEDICINE

## 2022-06-22 PROCEDURE — 1101F PR PT FALLS ASSESS DOC 0-1 FALLS W/OUT INJ PAST YR: ICD-10-PCS | Mod: CPTII,S$GLB,, | Performed by: INTERNAL MEDICINE

## 2022-06-22 PROCEDURE — 99214 PR OFFICE/OUTPT VISIT, EST, LEVL IV, 30-39 MIN: ICD-10-PCS | Mod: S$GLB,,, | Performed by: INTERNAL MEDICINE

## 2022-06-22 PROCEDURE — 3288F PR FALLS RISK ASSESSMENT DOCUMENTED: ICD-10-PCS | Mod: CPTII,S$GLB,, | Performed by: INTERNAL MEDICINE

## 2022-06-22 PROCEDURE — 1126F AMNT PAIN NOTED NONE PRSNT: CPT | Mod: CPTII,S$GLB,, | Performed by: INTERNAL MEDICINE

## 2022-06-22 RX ORDER — WARFARIN SODIUM 5 MG/1
5 TABLET ORAL DAILY
Qty: 30 TABLET | Refills: 11 | Status: SHIPPED | OUTPATIENT
Start: 2022-06-22 | End: 2023-03-10

## 2022-06-22 RX ORDER — ENOXAPARIN SODIUM 100 MG/ML
60 INJECTION SUBCUTANEOUS 2 TIMES DAILY
Qty: 16 EACH | Refills: 2 | Status: SHIPPED | OUTPATIENT
Start: 2022-06-22 | End: 2022-06-30

## 2022-06-22 NOTE — PROGRESS NOTES
Subjective:    Patient ID:  Nany Crenshaw is a 89 y.o. female who presents for follow-up of Hospital Follow Up      HPI     Recurrent RLE DVT - on eliquis, HTN, HLD, CAD, LBBB     Previously followed by Dr Noe  Here for follow-up of DVT and coronary artery disease.  She's had some inflammation and prominence of her varicosities in the right leg.  She's mainly worried about recurrence of clot of blood thinners.  She denies any worsening cardiopulmonary complaints.  She's not had any chest pain, shortness of breath or palpitations.  She denies any PND, orthopnea or lower edema.  She is not expressing dizziness, presyncope or syncope.     Here for follow-up of DVT and coronary artery disease.  She still has some prominence in varicosities in her legs.  She says at times it gets red around her right ankle but does not cause her pain.  She tolerated her blood thinner as had no problems.  She recently went to [facility] without any issues on the airplane.  Do a trip to [place] planned as well.  She denies any PND, orthopnea or lower extremity edema otherwise.  She's not expressing dizziness, presyncope or syncope.     -cont med tx  -cont to follow sx mainly  -Diet and exercise tolerated  -On Eliquis for OAC indefinitely for recurrent DVT     LE venous US 6/1/22  Thrombosis is seen within the left iliac, common femoral, femoral, popliteal, and peroneal veins.  No evidence of clot involving the right common femoral vein or left greater saphenous and anterior and posterior tibial veins.      lower extremity ultrasound:   11-17  Nonocclusive thrombus in the right femoral vein and right popliteal vein.     Echo 5/24/21  · The left ventricle is normal in size with mild concentric hypertrophy and normal systolic function.  · The estimated ejection fraction is 65%.  · Normal right ventricular size with normal right ventricular systolic function.  · Mild aortic regurgitation.  · Mild tricuspid regurgitation.  · The estimated PA  systolic pressure is 33 mmHg.     Event monitor 5/18/21  · Negative event monitor with no clinical arrhythmias.  · Symptoms corresponding with normal sinus rhythm.            Carotid ultrasound:  5-18  CONCLUSIONS   There is 20 - 39% right Internal Carotid stenosis.  There is 20 - 39% left Internal Carotid stenosis.     Lower extremity arterial ultrasound:  Impression:  1-  no hemodynamically significant plaque bilaterally     Lower extremity venous ultrasound:  TEST DESCRIPTION   Impression:    RIGHT:  Chronic occlusive DVT of the Femoral Vein.    LEFT:  No evidence of Left lower extremity DVT.       1/6/20 Daughter provided translation  Denies CP or SOB  Walks with a walker at home  EKG NSR LBBB - no change     Went to the ER 4/4/21  This is a 89-year-old  female presents to the emergency room with her grandson who is a 1st year resident at [employer] studying Internal Medicine.  The patient was taking off her slippers and she slipped and fell striking the left side of her thorax.  The patient complains of pain in the left lateral thorax and the left lateral thoracic back to midline spine is spared along its entire course.  There is no shortness of breath or precordial chest discomfort the patient has no abdominal complaints.  She is essentially otherwise well without other injuries or problems she has no extremity injuries.  There is no known head trauma.  The patient notes no abrasions contusions or tender areas on her scalp.  She has no neck pain.  She has no neurologic deficits.  There is no cough or difficulty breathing.        5/14/21 Reports several episodes of weakness with near syncope over the last few weeks  EKG NSR LBBB - no change. Denies CP or SOB   Echo and event monitor for near syncopal spells  Needs to go to the ER if symptoms worsen     6/21/21 Denies CP or SOB. Still with dizzy spells - denies LOC or falls   Event monitor negative for arrhythmias during dizzy spells  Echo with good  EF  Continue to observe - could decrease lisinopril or metoprolol dose if dizzy spells persist  Decrease eliquis 2.5 bid  Continue Rx for HTN, HLD, DVT, CAD      10/1/21 Denies CP, SOB, or dizziness. Mild ankle edema  EKG NSR LBBB  PRN lasix for LE edema    Continue Rx for HTN, HLD, DVT, CAD   OV 6 months    Went to the ER 6/2/22  HPI: This is a 89 y.o.female patient, with a PMHx of DVT, CAD and HTN , presenting to the ED for follow up from her ED visit yesterday regarding swelling of the left leg. Patient's daughter reports patient was here yesterday and diagnosed with DVT to her left leg. Daughter reports patient was told to come back this morning for her 2nd Lovenox injection and to be set up with home health. Daughter reports the patient's leg swelling has decreased and has gotten better since the administration of the first injection last night in the ED. Patient denies any other complications. Patient denies any history of blood clots in the lungs. Patient denies any shortness of breath, chest pain, vomiting, or any other associated symptoms. No known drug allergies.    6/22/22 Went back to eliquis after taking lovenox for 2 weeks. LLE still with mild erythema and swelling. Denies CP or SOB  EKG NSR LBBB    Review of Systems   Constitutional: Negative for decreased appetite.   HENT: Negative for ear discharge.    Eyes: Negative for blurred vision.   Respiratory: Negative for hemoptysis.    Endocrine: Negative for polyphagia.   Hematologic/Lymphatic: Negative for adenopathy.   Skin: Negative for color change.   Musculoskeletal: Negative for joint swelling.   Genitourinary: Negative for bladder incontinence.   Neurological: Negative for brief paralysis.   Psychiatric/Behavioral: Negative for hallucinations.   Allergic/Immunologic: Negative for hives.        Objective:    Physical Exam  Constitutional:       Appearance: She is well-developed.   HENT:      Head: Normocephalic and atraumatic.   Eyes:       Conjunctiva/sclera: Conjunctivae normal.      Pupils: Pupils are equal, round, and reactive to light.   Neck:      Thyroid: No thyromegaly.   Cardiovascular:      Rate and Rhythm: Normal rate and regular rhythm.      Heart sounds: No murmur heard.  Pulmonary:      Effort: Pulmonary effort is normal. No respiratory distress.      Breath sounds: Normal breath sounds.   Abdominal:      General: Bowel sounds are normal.      Palpations: Abdomen is soft.   Musculoskeletal:         General: Normal range of motion.      Cervical back: Normal range of motion and neck supple.      Comments: Prominent varicosities with some inflammation right greater than left   Skin:     General: Skin is warm and dry.   Neurological:      Mental Status: She is alert and oriented to person, place, and time.   Psychiatric:         Behavior: Behavior normal.           Assessment:       1. Dizziness    2. Deep vein thrombosis (DVT) of iliac vein of right lower extremity, unspecified chronicity    3. Benign hypertension    4. Coronary artery disease involving native coronary artery of native heart without angina pectoris    5. Dyslipidemia         Plan:       Recurrent DVT on eliquis  Stop eliquis  Start coumadin 5 mg qd  Refer to coumadin clinic  Bridge with lovenox 60 mg bid until INR  > 2  OV 1 month  Continue Rx for HTN, HLD, CAD

## 2022-06-23 ENCOUNTER — PATIENT MESSAGE (OUTPATIENT)
Dept: CARDIOLOGY | Facility: CLINIC | Age: 87
End: 2022-06-23

## 2022-06-23 ENCOUNTER — ANTI-COAG VISIT (OUTPATIENT)
Dept: CARDIOLOGY | Facility: CLINIC | Age: 87
End: 2022-06-23
Payer: MEDICARE

## 2022-06-23 DIAGNOSIS — Z79.01 LONG TERM (CURRENT) USE OF ANTICOAGULANTS: ICD-10-CM

## 2022-06-23 DIAGNOSIS — I82.421 DEEP VEIN THROMBOSIS (DVT) OF ILIAC VEIN OF RIGHT LOWER EXTREMITY, UNSPECIFIED CHRONICITY: Primary | ICD-10-CM

## 2022-06-23 PROBLEM — I48.91 ATRIAL FIBRILLATION: Status: ACTIVE | Noted: 2022-06-23

## 2022-06-23 PROCEDURE — 93793 PR ANTICOAGULANT MGMT FOR PT TAKING WARFARIN: ICD-10-PCS | Mod: S$GLB,,,

## 2022-06-23 PROCEDURE — 93793 ANTICOAG MGMT PT WARFARIN: CPT | Mod: S$GLB,,,

## 2022-06-23 NOTE — PROGRESS NOTES
Spoke to patient's daughter, Ms. Damon, regarding enrollment into the Coumadin Clinic. Due to the patient's prescription not being filled at this time, Marisol was unable to verify warfarin tablet strength. She was advised on a current warfarin regimen of 5mg of warfarin on 6/23 & 6/24, then 2.5 mg on 6/25 & 6/26.  In addition, Ms. Damon understands that patient is to continue with 60 mg lovenox injections, b.i.d, until patient's INR is therapeutic. She expressed understanding. Next INR scheduled for 6/27/22, verified with George , and confirmed with Ms. Damon.    Ms. Damon was educated on diet and when to call. All questions and concerns addressed at this time, and Ms. Damon expressed understanding.  Patient education sheets sent via patient portal to reinforce teaching.    Patient will eat greens once per week, and drink 1 glass of red wine daily. The risks associated with drinking ETOH and the importance of a consistent diet discussed with Ms. Damon.

## 2022-06-23 NOTE — PROGRESS NOTES
Patient is transitioning from Eliquis to warfarin for recurrent DVT's. At cardiologist visit on 6/22, patient was instructed to start Lovenox 60mg BID (CrCl 45 ml/min) until her INR reached >2, as well as to d/c Eliquis and start warfarin.  Patient is currently enrolled in Home Health with [facility] who is assisting with her anticoagulation    US on 6/1/22: Thrombosis is seen within the left iliac, common femoral, femoral, popliteal, and peroneal veins.  No evidence of clot involving the right common femoral vein or left greater saphenous and anterior and posterior tibial veins

## 2022-06-27 ENCOUNTER — ANTI-COAG VISIT (OUTPATIENT)
Dept: CARDIOLOGY | Facility: CLINIC | Age: 87
End: 2022-06-27
Payer: MEDICARE

## 2022-06-27 DIAGNOSIS — Z79.01 LONG TERM (CURRENT) USE OF ANTICOAGULANTS: ICD-10-CM

## 2022-06-27 DIAGNOSIS — I82.421 DEEP VEIN THROMBOSIS (DVT) OF ILIAC VEIN OF RIGHT LOWER EXTREMITY, UNSPECIFIED CHRONICITY: Primary | ICD-10-CM

## 2022-06-27 LAB — INR PPP: 1.1

## 2022-06-27 PROCEDURE — 93793 ANTICOAG MGMT PT WARFARIN: CPT | Mod: S$GLB,,,

## 2022-06-27 PROCEDURE — 93793 PR ANTICOAGULANT MGMT FOR PT TAKING WARFARIN: ICD-10-PCS | Mod: S$GLB,,,

## 2022-06-28 NOTE — PROGRESS NOTES
"PATIENT: Nany Crenshaw  MRN: [id number]  DATE: 6/29/2022    Diagnosis:   1. Acute deep vein thrombosis (DVT) of left femoral vein    2. History of deep vein thrombosis (DVT) of lower extremity    3. Chronic anticoagulation    4. Warfarin anticoagulation    5. Advance care planning      Chief Complaint: Deep Vein Thrombosis    Oncologic History:      Oncologic History     Oncologic Treatment     Pathology       Subjective:    History of Present Illness: Ms. Crenshaw is a 89 y.o. female who presents for evaluation and management of deep vein thrombosis. She is referred by Dr. Mcdaniel.    - presenting symptom was left leg swelling.  - ultrasound lower extremity left (6/1/22) revealed a "thrombosis is seen within the left iliac, common femoral, femoral, popliteal, and peroneal veins.  No evidence of clot involving the right common femoral vein or left greater saphenous and anterior and posterior tibial veins."  - she has a history of deep vein thrombosis of right leg. She had been taking apixaban 2.5mg BID prior to most recent diagnosis of left lower-extremity deep vein thrombosis.  - she is now on warfarin. She is taking lovenox    - in November 2021, she fell and broke her pelvis. The daughter notes significant immobility since then.  - today, she endorses fatigue, weakness, blisters in legs related to swelling, hematomas from the lovenox injections.      Past medical, surgical, family, and social histories have been reviewed and updated below.    Past Medical History:   Past Medical History:   Diagnosis Date    Colon polyp     no further colon needed    Coronary artery disease     Diverticulosis     Fracture of right distal radius     GERD (gastroesophageal reflux disease)     Hypertension     Hypothyroidism (acquired)     Osteoporosis, post-menopausal     Scoliosis        Past Surgical History:   Past Surgical History:   Procedure Laterality Date    ADENOIDECTOMY      CATARACT EXTRACTION      ou    "  SECTION      CHOLECYSTECTOMY      EYE SURGERY  2014    ptosis surgery      HYSTERECTOMY      yag cap      od       Family History:   Family History   Problem Relation Age of Onset    Cancer Mother         skin cancer     Dementia Mother     Hypertension Mother     Heart disease Brother     Cancer Brother         lung cancer     No Known Problems Father     No Known Problems Sister     No Known Problems Maternal Aunt     No Known Problems Maternal Uncle     No Known Problems Paternal Aunt     No Known Problems Paternal Uncle     No Known Problems Maternal Grandmother     No Known Problems Maternal Grandfather     No Known Problems Paternal Grandmother     No Known Problems Paternal Grandfather     Amblyopia Neg Hx     Blindness Neg Hx     Cataracts Neg Hx     Diabetes Neg Hx     Glaucoma Neg Hx     Macular degeneration Neg Hx     Retinal detachment Neg Hx     Strabismus Neg Hx     Stroke Neg Hx     Thyroid disease Neg Hx     Melanoma Neg Hx        Social History:  reports that she has never smoked. She has never used smokeless tobacco. She reports current alcohol use of about 6.0 standard drinks of alcohol per week. She reports that she does not use drugs.    Allergies:  Review of patient's allergies indicates:   Allergen Reactions    Bactrim [sulfamethoxazole-trimethoprim] Nausea And Vomiting       Medications:  Current Outpatient Medications   Medication Sig Dispense Refill    ciclopirox (PENLAC) 8 % Soln Apply topically nightly. 6.6 mL 11    enoxaparin (LOVENOX) 100 mg/mL Syrg Inject 0.6 mLs (60 mg total) into the skin 2 (two) times a day. for 8 days 16 each 2    ezetimibe (ZETIA) 10 mg tablet TAKE 1 TABLET(10 MG) BY MOUTH EVERY DAY 90 tablet 1    furosemide (LASIX) 20 MG tablet Take 1 tablet (20 mg total) by mouth daily as needed (ankle swelling). 30 tablet 11    levothyroxine (SYNTHROID) 50 MCG tablet TAKE 1 TABLET BY MOUTH DAILY 90 tablet 4    lisinopriL 10 MG  tablet TAKE 1 AND 1/2 TABLETS(15 MG) BY MOUTH EVERY  tablet 3    metoprolol tartrate (LOPRESSOR) 25 MG tablet TAKE 1 TABLET BY MOUTH TWICE DAILY 180 tablet 4    nystatin (MYCOSTATIN) cream Apply topically 2 (two) times daily as needed for Dry Skin. 30 g 2    simvastatin (ZOCOR) 40 MG tablet TAKE 1 TABLET(40 MG) BY MOUTH EVERY EVENING 90 tablet 1    warfarin (COUMADIN) 5 MG tablet Take 1 tablet (5 mg total) by mouth Daily. 30 tablet 11     No current facility-administered medications for this visit.       Review of Systems   Constitutional: Positive for fatigue.   HENT: Negative for sore throat.    Eyes: Negative for visual disturbance.   Respiratory: Negative for cough and shortness of breath.    Cardiovascular: Positive for leg swelling. Negative for chest pain.   Gastrointestinal: Negative for abdominal pain, constipation, diarrhea, nausea and vomiting.   Genitourinary: Negative for dysuria.   Musculoskeletal: Negative for back pain.   Skin: Negative for rash.   Neurological: Positive for weakness. Negative for headaches.   Hematological: Negative for adenopathy.   Psychiatric/Behavioral: The patient is not nervous/anxious.        ECOG Performance Status:   ECOG SCORE    1 - Restricted in strenuous activity-ambulatory and able to carry out work of a light nature         Objective:      Vitals:   Vitals:    06/29/22 1419   BP: 133/60   BP Location: Left arm   Patient Position: Sitting   BP Method: Medium (Automatic)   Pulse: 64   Resp: 18   SpO2: 95%     BMI: There is no height or weight on file to calculate BMI.    Physical Exam  Vitals and nursing note reviewed.   Constitutional:       Appearance: She is well-developed.      Comments: Fatigued, in wheelchair   HENT:      Head: Normocephalic and atraumatic.   Eyes:      Pupils: Pupils are equal, round, and reactive to light.   Cardiovascular:      Rate and Rhythm: Normal rate and regular rhythm.   Pulmonary:      Effort: Pulmonary effort is normal.       Breath sounds: Normal breath sounds.   Abdominal:      General: Bowel sounds are normal.      Palpations: Abdomen is soft.   Musculoskeletal:         General: Swelling (left leg) present. Normal range of motion.      Cervical back: Normal range of motion and neck supple.   Skin:     General: Skin is warm and dry.   Neurological:      Mental Status: She is alert and oriented to person, place, and time.   Psychiatric:         Behavior: Behavior normal.         Thought Content: Thought content normal.         Judgment: Judgment normal.       Laboratory Data:  Labs have been reviewed.    Lab Results   Component Value Date    WBC 5.93 06/06/2022    HGB 12.7 06/06/2022    HCT 39.4 06/06/2022    MCV 91 06/06/2022     06/06/2022           Imaging:     Ultrasound left lower extremity (6/1/22):    Thrombosis is seen within the left iliac, common femoral, femoral, popliteal, and peroneal veins.  No evidence of clot involving the right common femoral vein or left greater saphenous and anterior and posterior tibial veins.     Impression:     Extensive left lower extremity deep venous thrombosis, as above.      Ultrasound lower extremity bilateral (11/9/17):    RIGHT LEG:  There is nonocclusive thrombus identified within the right femoral vein and right popliteal vein.  The right common femoral vein, greater saphenous vein, peroneal and anterior and posterior tibial veins show appear patent.        LEFT LEG:  The left common femoral, femoral, greater saphenous, popliteal, peroneal and anterior and posterior tibial veins show no signs of deep vein thrombosis.  IMPRESSION:      Nonocclusive thrombus in the right femoral vein and right popliteal vein.    Assessment:       1. Acute deep vein thrombosis (DVT) of left femoral vein    2. History of deep vein thrombosis (DVT) of lower extremity    3. Chronic anticoagulation    4. Warfarin anticoagulation    5. Advance care planning           Plan:     1. Deep vein thrombosis /  "history of DVT  - I have reviewed her chart  - presenting symptom was left leg swelling.  - ultrasound lower extremity left (6/1/22) revealed a "thrombosis is seen within the left iliac, common femoral, femoral, popliteal, and peroneal veins.  No evidence of clot involving the right common femoral vein or left greater saphenous and anterior and posterior tibial veins."  - she has a history of deep vein thrombosis of right leg. She had been taking apixaban 2.5mg BID prior to most recent diagnosis of left lower-extremity deep vein thrombosis.  - she is now on warfarin.  - it sounds like her clot was provoked in the setting of immobility from the pelvic fracture, combined with the lowering of the apixaban dose. She tried full-dose apixaban in June 2022 after a few weeks of enoxaparin, but the swelling worsened, so she was placed back on enoxaparin/warfarin.  - at this time, warfarin is reasonable. If she has difficulty maintaining a therapeutic INR in the future, I asked her to reach out to me. We could try rivaroxaban at that time.  - will defer hypercoagulable workup at this time given that this appears provoked in the setting of immobility.  - return to clinic as needed.    2. Advance Care Planning     Date: 06/29/2022    Power of   I initiated the process of advance care planning today and explained the importance of this process to the patient.  I introduced the concept of advance directives to the patient, as well. Then the patient received detailed information about the importance of designating a Health Care Power of  (HCPOA). She was also instructed to communicate with this person about their wishes for future healthcare, should she become sick and lose decision-making capacity. The patient has not previously appointed a HCPOA. After our discussion, the patient has decided to complete a HCPOA and has appointed her daughter Marisol Hayes ([phone]) and Janie Bean.. I spent a total time " of 16 minutes discussing this issue with the patient.          - return to clinic as needed.    Vijay Estrada M.D.  Hematology/Oncology  [facility] - [address], [address]  [place] [address]  Phone: [phone]  Fax: [phone]

## 2022-06-29 ENCOUNTER — OFFICE VISIT (OUTPATIENT)
Dept: HEMATOLOGY/ONCOLOGY | Facility: CLINIC | Age: 87
End: 2022-06-29
Payer: MEDICARE

## 2022-06-29 VITALS
DIASTOLIC BLOOD PRESSURE: 60 MMHG | RESPIRATION RATE: 18 BRPM | SYSTOLIC BLOOD PRESSURE: 133 MMHG | OXYGEN SATURATION: 95 % | HEART RATE: 64 BPM

## 2022-06-29 DIAGNOSIS — Z79.01 WARFARIN ANTICOAGULATION: ICD-10-CM

## 2022-06-29 DIAGNOSIS — Z86.718 HISTORY OF DEEP VEIN THROMBOSIS (DVT) OF LOWER EXTREMITY: ICD-10-CM

## 2022-06-29 DIAGNOSIS — Z79.01 CHRONIC ANTICOAGULATION: ICD-10-CM

## 2022-06-29 DIAGNOSIS — I82.412 ACUTE DEEP VEIN THROMBOSIS (DVT) OF LEFT FEMORAL VEIN: Primary | ICD-10-CM

## 2022-06-29 DIAGNOSIS — Z71.89 ADVANCE CARE PLANNING: ICD-10-CM

## 2022-06-29 PROCEDURE — 1100F PR PT FALLS ASSESS DOC 2+ FALLS/FALL W/INJURY/YR: ICD-10-PCS | Mod: CPTII,S$GLB,, | Performed by: INTERNAL MEDICINE

## 2022-06-29 PROCEDURE — 99497 ADVNCD CARE PLAN 30 MIN: CPT | Mod: S$GLB,,, | Performed by: INTERNAL MEDICINE

## 2022-06-29 PROCEDURE — 1160F PR REVIEW ALL MEDS BY PRESCRIBER/CLIN PHARMACIST DOCUMENTED: ICD-10-PCS | Mod: CPTII,S$GLB,, | Performed by: INTERNAL MEDICINE

## 2022-06-29 PROCEDURE — 1158F PR ADVANCE CARE PLANNING DISCUSS DOCUMENTED IN MEDICAL RECORD: ICD-10-PCS | Mod: CPTII,S$GLB,, | Performed by: INTERNAL MEDICINE

## 2022-06-29 PROCEDURE — 1158F ADVNC CARE PLAN TLK DOCD: CPT | Mod: CPTII,S$GLB,, | Performed by: INTERNAL MEDICINE

## 2022-06-29 PROCEDURE — 99999 PR PBB SHADOW E&M-EST. PATIENT-LVL III: ICD-10-PCS | Mod: PBBFAC,,, | Performed by: INTERNAL MEDICINE

## 2022-06-29 PROCEDURE — 3288F PR FALLS RISK ASSESSMENT DOCUMENTED: ICD-10-PCS | Mod: CPTII,S$GLB,, | Performed by: INTERNAL MEDICINE

## 2022-06-29 PROCEDURE — 1160F RVW MEDS BY RX/DR IN RCRD: CPT | Mod: CPTII,S$GLB,, | Performed by: INTERNAL MEDICINE

## 2022-06-29 PROCEDURE — 99204 PR OFFICE/OUTPT VISIT, NEW, LEVL IV, 45-59 MIN: ICD-10-PCS | Mod: S$GLB,,, | Performed by: INTERNAL MEDICINE

## 2022-06-29 PROCEDURE — 1126F PR PAIN SEVERITY QUANTIFIED, NO PAIN PRESENT: ICD-10-PCS | Mod: CPTII,S$GLB,, | Performed by: INTERNAL MEDICINE

## 2022-06-29 PROCEDURE — 3288F FALL RISK ASSESSMENT DOCD: CPT | Mod: CPTII,S$GLB,, | Performed by: INTERNAL MEDICINE

## 2022-06-29 PROCEDURE — 99999 PR PBB SHADOW E&M-EST. PATIENT-LVL III: CPT | Mod: PBBFAC,,, | Performed by: INTERNAL MEDICINE

## 2022-06-29 PROCEDURE — 1100F PTFALLS ASSESS-DOCD GE2>/YR: CPT | Mod: CPTII,S$GLB,, | Performed by: INTERNAL MEDICINE

## 2022-06-29 PROCEDURE — 1126F AMNT PAIN NOTED NONE PRSNT: CPT | Mod: CPTII,S$GLB,, | Performed by: INTERNAL MEDICINE

## 2022-06-29 PROCEDURE — 1159F PR MEDICATION LIST DOCUMENTED IN MEDICAL RECORD: ICD-10-PCS | Mod: CPTII,S$GLB,, | Performed by: INTERNAL MEDICINE

## 2022-06-29 PROCEDURE — 99497 PR ADVNCD CARE PLAN 30 MIN: ICD-10-PCS | Mod: S$GLB,,, | Performed by: INTERNAL MEDICINE

## 2022-06-29 PROCEDURE — 1159F MED LIST DOCD IN RCRD: CPT | Mod: CPTII,S$GLB,, | Performed by: INTERNAL MEDICINE

## 2022-06-29 PROCEDURE — 99204 OFFICE O/P NEW MOD 45 MIN: CPT | Mod: S$GLB,,, | Performed by: INTERNAL MEDICINE

## 2022-06-30 LAB — INR PPP: 2.4

## 2022-07-05 ENCOUNTER — LAB VISIT (OUTPATIENT)
Dept: LAB | Facility: HOSPITAL | Age: 87
End: 2022-07-05
Attending: EMERGENCY MEDICINE
Payer: MEDICARE

## 2022-07-05 DIAGNOSIS — I25.10 CORONARY ATHEROSCLEROSIS OF NATIVE CORONARY ARTERY: ICD-10-CM

## 2022-07-05 DIAGNOSIS — I82.402 DEEP VEIN THROMBOSIS OF LEFT LOWER LIMB: Primary | ICD-10-CM

## 2022-07-05 DIAGNOSIS — I10 ESSENTIAL HYPERTENSION, MALIGNANT: ICD-10-CM

## 2022-07-05 LAB
BASOPHILS # BLD AUTO: 0.05 K/UL (ref 0–0.2)
BASOPHILS NFR BLD: 0.5 % (ref 0–1.9)
DIFFERENTIAL METHOD: ABNORMAL
EOSINOPHIL # BLD AUTO: 0 K/UL (ref 0–0.5)
EOSINOPHIL NFR BLD: 0.4 % (ref 0–8)
ERYTHROCYTE [DISTWIDTH] IN BLOOD BY AUTOMATED COUNT: 13.6 % (ref 11.5–14.5)
HCT VFR BLD AUTO: 41.3 % (ref 37–48.5)
HGB BLD-MCNC: 13.9 G/DL (ref 12–16)
IMM GRANULOCYTES # BLD AUTO: 0.04 K/UL (ref 0–0.04)
IMM GRANULOCYTES NFR BLD AUTO: 0.4 % (ref 0–0.5)
LYMPHOCYTES # BLD AUTO: 1.2 K/UL (ref 1–4.8)
LYMPHOCYTES NFR BLD: 11.4 % (ref 18–48)
MCH RBC QN AUTO: 29.9 PG (ref 27–31)
MCHC RBC AUTO-ENTMCNC: 33.7 G/DL (ref 32–36)
MCV RBC AUTO: 89 FL (ref 82–98)
MONOCYTES # BLD AUTO: 0.5 K/UL (ref 0.3–1)
MONOCYTES NFR BLD: 5.2 % (ref 4–15)
NEUTROPHILS # BLD AUTO: 8.3 K/UL (ref 1.8–7.7)
NEUTROPHILS NFR BLD: 82.1 % (ref 38–73)
NRBC BLD-RTO: 0 /100 WBC
PLATELET # BLD AUTO: 261 K/UL (ref 150–450)
PMV BLD AUTO: 9.7 FL (ref 9.2–12.9)
RBC # BLD AUTO: 4.65 M/UL (ref 4–5.4)
WBC # BLD AUTO: 10.12 K/UL (ref 3.9–12.7)

## 2022-07-05 PROCEDURE — 85025 COMPLETE CBC W/AUTO DIFF WBC: CPT | Performed by: EMERGENCY MEDICINE

## 2022-07-06 ENCOUNTER — ANTI-COAG VISIT (OUTPATIENT)
Dept: CARDIOLOGY | Facility: CLINIC | Age: 87
End: 2022-07-06
Payer: MEDICARE

## 2022-07-06 DIAGNOSIS — Z79.01 LONG TERM (CURRENT) USE OF ANTICOAGULANTS: ICD-10-CM

## 2022-07-06 DIAGNOSIS — I82.421 DEEP VEIN THROMBOSIS (DVT) OF ILIAC VEIN OF RIGHT LOWER EXTREMITY, UNSPECIFIED CHRONICITY: Primary | ICD-10-CM

## 2022-07-06 PROCEDURE — 93793 PR ANTICOAGULANT MGMT FOR PT TAKING WARFARIN: ICD-10-PCS | Mod: S$GLB,,,

## 2022-07-06 PROCEDURE — 93793 ANTICOAG MGMT PT WARFARIN: CPT | Mod: S$GLB,,,

## 2022-07-06 NOTE — PROGRESS NOTES
Advised patients daughter with dosing instructions (HOLD/stop lovonox) Also spoke with nurse care gladis Xiong with [facility] to schedule INR visit for 7/7.

## 2022-07-06 NOTE — PROGRESS NOTES
INR drawn last week at goal but only after 3 doses of warfarin. Suspect INR may be quite elevated now if this dose was continued. Recommend hold warfarin tonight and INR today or tomorrow. Patient should stop LMWH if not already done so.

## 2022-07-07 ENCOUNTER — TELEPHONE (OUTPATIENT)
Dept: FAMILY MEDICINE | Facility: CLINIC | Age: 87
End: 2022-07-07
Payer: MEDICARE

## 2022-07-07 ENCOUNTER — ANTI-COAG VISIT (OUTPATIENT)
Dept: CARDIOLOGY | Facility: CLINIC | Age: 87
End: 2022-07-07
Payer: MEDICARE

## 2022-07-07 DIAGNOSIS — Z79.01 LONG TERM (CURRENT) USE OF ANTICOAGULANTS: ICD-10-CM

## 2022-07-07 DIAGNOSIS — I82.421 DEEP VEIN THROMBOSIS (DVT) OF ILIAC VEIN OF RIGHT LOWER EXTREMITY, UNSPECIFIED CHRONICITY: Primary | ICD-10-CM

## 2022-07-07 LAB — INR PPP: 4.1

## 2022-07-07 PROCEDURE — 93793 ANTICOAG MGMT PT WARFARIN: CPT | Mod: S$GLB,,,

## 2022-07-07 PROCEDURE — 93793 PR ANTICOAGULANT MGMT FOR PT TAKING WARFARIN: ICD-10-PCS | Mod: S$GLB,,,

## 2022-07-07 NOTE — TELEPHONE ENCOUNTER
Spoke with [facility] stated message suppose to be sent to coumadin clinic not cardiologist. She stated she had fax information to coumadin and that she will contact them regarding information for patient.

## 2022-07-07 NOTE — TELEPHONE ENCOUNTER
----- Message from Kary Willoughby MA sent at 6/30/2022 11:10 AM CDT -----  Type: Patient Call Back    Who called: [facility] [place]    What is the request in detail:calling to INR result from today 2.4    Can the clinic reply by [facility]?no    Would the patient rather a call back or a response via [facility]? yes    Best call back number [phone]

## 2022-07-07 NOTE — TELEPHONE ENCOUNTER
----- Message from Sue Gallagher sent at 7/7/2022 11:16 AM CDT -----  Contact: Nurse Tami, [facility]//Please call Elizabeth Sarabia [phone]  Type: Patient Call Back    Who called: Nurse Tami, [place] [facility]    What is the request in detail: Calling to report INR Results on patient. Tami states that the results at 4.1. Please call the , Elizabeth Sarabia at [phone], in regards to the results.     Would the patient rather a call back or a response via [facility]? Call back    Best call back number: Elizabeth Sarabia [phone]

## 2022-07-07 NOTE — TELEPHONE ENCOUNTER
I believe home health calling to inform that patient have recent INR yesterday  Was 2.4. LM for [facility] to call clinic back to confirm.

## 2022-07-12 ENCOUNTER — ANTI-COAG VISIT (OUTPATIENT)
Dept: CARDIOLOGY | Facility: CLINIC | Age: 87
End: 2022-07-12
Payer: MEDICARE

## 2022-07-12 DIAGNOSIS — I82.421 DEEP VEIN THROMBOSIS (DVT) OF ILIAC VEIN OF RIGHT LOWER EXTREMITY, UNSPECIFIED CHRONICITY: Primary | ICD-10-CM

## 2022-07-12 DIAGNOSIS — Z79.01 LONG TERM (CURRENT) USE OF ANTICOAGULANTS: ICD-10-CM

## 2022-07-12 LAB — INR PPP: 4.2

## 2022-07-12 PROCEDURE — 93793 ANTICOAG MGMT PT WARFARIN: CPT | Mod: S$GLB,,,

## 2022-07-12 PROCEDURE — 93793 PR ANTICOAGULANT MGMT FOR PT TAKING WARFARIN: ICD-10-PCS | Mod: S$GLB,,,

## 2022-07-18 ENCOUNTER — LAB VISIT (OUTPATIENT)
Dept: LAB | Facility: HOSPITAL | Age: 87
End: 2022-07-18
Attending: EMERGENCY MEDICINE
Payer: MEDICARE

## 2022-07-18 ENCOUNTER — ANTI-COAG VISIT (OUTPATIENT)
Dept: CARDIOLOGY | Facility: CLINIC | Age: 87
End: 2022-07-18
Payer: MEDICARE

## 2022-07-18 DIAGNOSIS — Z79.01 LONG TERM (CURRENT) USE OF ANTICOAGULANTS: ICD-10-CM

## 2022-07-18 DIAGNOSIS — I82.402 DEEP VEIN THROMBOSIS OF LEFT LOWER LIMB: Primary | ICD-10-CM

## 2022-07-18 DIAGNOSIS — I82.421 DEEP VEIN THROMBOSIS (DVT) OF ILIAC VEIN OF RIGHT LOWER EXTREMITY, UNSPECIFIED CHRONICITY: Primary | ICD-10-CM

## 2022-07-18 LAB
BASOPHILS # BLD AUTO: 0.05 K/UL (ref 0–0.2)
BASOPHILS NFR BLD: 0.7 % (ref 0–1.9)
DIFFERENTIAL METHOD: ABNORMAL
EOSINOPHIL # BLD AUTO: 0 K/UL (ref 0–0.5)
EOSINOPHIL NFR BLD: 0.5 % (ref 0–8)
ERYTHROCYTE [DISTWIDTH] IN BLOOD BY AUTOMATED COUNT: 13.7 % (ref 11.5–14.5)
HCT VFR BLD AUTO: 41.2 % (ref 37–48.5)
HGB BLD-MCNC: 13.5 G/DL (ref 12–16)
IMM GRANULOCYTES # BLD AUTO: 0.03 K/UL (ref 0–0.04)
IMM GRANULOCYTES NFR BLD AUTO: 0.4 % (ref 0–0.5)
INR PPP: 2.7
LYMPHOCYTES # BLD AUTO: 1.3 K/UL (ref 1–4.8)
LYMPHOCYTES NFR BLD: 17.7 % (ref 18–48)
MCH RBC QN AUTO: 29.6 PG (ref 27–31)
MCHC RBC AUTO-ENTMCNC: 32.8 G/DL (ref 32–36)
MCV RBC AUTO: 90 FL (ref 82–98)
MONOCYTES # BLD AUTO: 0.5 K/UL (ref 0.3–1)
MONOCYTES NFR BLD: 6 % (ref 4–15)
NEUTROPHILS # BLD AUTO: 5.6 K/UL (ref 1.8–7.7)
NEUTROPHILS NFR BLD: 74.7 % (ref 38–73)
NRBC BLD-RTO: 0 /100 WBC
PLATELET # BLD AUTO: 259 K/UL (ref 150–450)
PMV BLD AUTO: 9.5 FL (ref 9.2–12.9)
RBC # BLD AUTO: 4.56 M/UL (ref 4–5.4)
WBC # BLD AUTO: 7.47 K/UL (ref 3.9–12.7)

## 2022-07-18 PROCEDURE — 85025 COMPLETE CBC W/AUTO DIFF WBC: CPT | Performed by: EMERGENCY MEDICINE

## 2022-07-18 PROCEDURE — 93793 ANTICOAG MGMT PT WARFARIN: CPT | Mod: S$GLB,,,

## 2022-07-18 PROCEDURE — 93793 PR ANTICOAGULANT MGMT FOR PT TAKING WARFARIN: ICD-10-PCS | Mod: S$GLB,,,

## 2022-07-19 ENCOUNTER — PATIENT MESSAGE (OUTPATIENT)
Dept: RESEARCH | Facility: CLINIC | Age: 87
End: 2022-07-19
Payer: MEDICARE

## 2022-07-26 ENCOUNTER — ANTI-COAG VISIT (OUTPATIENT)
Dept: CARDIOLOGY | Facility: CLINIC | Age: 87
End: 2022-07-26
Payer: MEDICARE

## 2022-07-26 DIAGNOSIS — I82.421 DEEP VEIN THROMBOSIS (DVT) OF ILIAC VEIN OF RIGHT LOWER EXTREMITY, UNSPECIFIED CHRONICITY: Primary | ICD-10-CM

## 2022-07-26 DIAGNOSIS — Z79.01 LONG TERM (CURRENT) USE OF ANTICOAGULANTS: ICD-10-CM

## 2022-07-26 LAB — INR PPP: 2.8

## 2022-07-26 PROCEDURE — 93793 ANTICOAG MGMT PT WARFARIN: CPT | Mod: S$GLB,,,

## 2022-07-26 PROCEDURE — 93793 PR ANTICOAGULANT MGMT FOR PT TAKING WARFARIN: ICD-10-PCS | Mod: S$GLB,,,

## 2022-08-04 ENCOUNTER — ANTI-COAG VISIT (OUTPATIENT)
Dept: CARDIOLOGY | Facility: CLINIC | Age: 87
End: 2022-08-04
Payer: MEDICARE

## 2022-08-04 ENCOUNTER — LAB VISIT (OUTPATIENT)
Dept: LAB | Facility: HOSPITAL | Age: 87
End: 2022-08-04
Attending: EMERGENCY MEDICINE
Payer: MEDICARE

## 2022-08-04 ENCOUNTER — OFFICE VISIT (OUTPATIENT)
Dept: CARDIOLOGY | Facility: CLINIC | Age: 87
End: 2022-08-04
Payer: MEDICARE

## 2022-08-04 VITALS
HEIGHT: 56 IN | WEIGHT: 110 LBS | HEART RATE: 62 BPM | RESPIRATION RATE: 19 BRPM | SYSTOLIC BLOOD PRESSURE: 127 MMHG | DIASTOLIC BLOOD PRESSURE: 62 MMHG | OXYGEN SATURATION: 97 % | BODY MASS INDEX: 24.75 KG/M2

## 2022-08-04 DIAGNOSIS — I25.10 CORONARY ARTERY DISEASE INVOLVING NATIVE CORONARY ARTERY OF NATIVE HEART WITHOUT ANGINA PECTORIS: ICD-10-CM

## 2022-08-04 DIAGNOSIS — I82.402 DEEP VEIN THROMBOSIS OF LEFT LOWER LIMB: Primary | ICD-10-CM

## 2022-08-04 DIAGNOSIS — I82.421 DEEP VEIN THROMBOSIS (DVT) OF ILIAC VEIN OF RIGHT LOWER EXTREMITY, UNSPECIFIED CHRONICITY: Primary | ICD-10-CM

## 2022-08-04 DIAGNOSIS — I10 BENIGN HYPERTENSION: ICD-10-CM

## 2022-08-04 DIAGNOSIS — I73.9 PERIPHERAL VASCULAR DISEASE, UNSPECIFIED: ICD-10-CM

## 2022-08-04 DIAGNOSIS — E78.00 PURE HYPERCHOLESTEROLEMIA: Primary | ICD-10-CM

## 2022-08-04 DIAGNOSIS — Z79.01 LONG TERM (CURRENT) USE OF ANTICOAGULANTS: ICD-10-CM

## 2022-08-04 LAB
BASOPHILS # BLD AUTO: 0.04 K/UL (ref 0–0.2)
BASOPHILS NFR BLD: 0.6 % (ref 0–1.9)
DIFFERENTIAL METHOD: ABNORMAL
EOSINOPHIL # BLD AUTO: 0 K/UL (ref 0–0.5)
EOSINOPHIL NFR BLD: 0.5 % (ref 0–8)
ERYTHROCYTE [DISTWIDTH] IN BLOOD BY AUTOMATED COUNT: 13.3 % (ref 11.5–14.5)
HCT VFR BLD AUTO: 41.1 % (ref 37–48.5)
HGB BLD-MCNC: 13.5 G/DL (ref 12–16)
IMM GRANULOCYTES # BLD AUTO: 0.02 K/UL (ref 0–0.04)
IMM GRANULOCYTES NFR BLD AUTO: 0.3 % (ref 0–0.5)
INR PPP: 2.6
LYMPHOCYTES # BLD AUTO: 1.1 K/UL (ref 1–4.8)
LYMPHOCYTES NFR BLD: 16.4 % (ref 18–48)
MCH RBC QN AUTO: 29.2 PG (ref 27–31)
MCHC RBC AUTO-ENTMCNC: 32.8 G/DL (ref 32–36)
MCV RBC AUTO: 89 FL (ref 82–98)
MONOCYTES # BLD AUTO: 0.4 K/UL (ref 0.3–1)
MONOCYTES NFR BLD: 5.4 % (ref 4–15)
NEUTROPHILS # BLD AUTO: 5 K/UL (ref 1.8–7.7)
NEUTROPHILS NFR BLD: 76.8 % (ref 38–73)
NRBC BLD-RTO: 0 /100 WBC
PLATELET # BLD AUTO: 240 K/UL (ref 150–450)
PMV BLD AUTO: 9.4 FL (ref 9.2–12.9)
RBC # BLD AUTO: 4.62 M/UL (ref 4–5.4)
WBC # BLD AUTO: 6.51 K/UL (ref 3.9–12.7)

## 2022-08-04 PROCEDURE — 3288F PR FALLS RISK ASSESSMENT DOCUMENTED: ICD-10-PCS | Mod: CPTII,S$GLB,, | Performed by: INTERNAL MEDICINE

## 2022-08-04 PROCEDURE — 99214 OFFICE O/P EST MOD 30 MIN: CPT | Mod: S$GLB,,, | Performed by: INTERNAL MEDICINE

## 2022-08-04 PROCEDURE — 85025 COMPLETE CBC W/AUTO DIFF WBC: CPT | Performed by: EMERGENCY MEDICINE

## 2022-08-04 PROCEDURE — 99999 PR PBB SHADOW E&M-EST. PATIENT-LVL III: ICD-10-PCS | Mod: PBBFAC,,, | Performed by: INTERNAL MEDICINE

## 2022-08-04 PROCEDURE — 3288F FALL RISK ASSESSMENT DOCD: CPT | Mod: CPTII,S$GLB,, | Performed by: INTERNAL MEDICINE

## 2022-08-04 PROCEDURE — 1126F PR PAIN SEVERITY QUANTIFIED, NO PAIN PRESENT: ICD-10-PCS | Mod: CPTII,S$GLB,, | Performed by: INTERNAL MEDICINE

## 2022-08-04 PROCEDURE — 99999 PR PBB SHADOW E&M-EST. PATIENT-LVL III: CPT | Mod: PBBFAC,,, | Performed by: INTERNAL MEDICINE

## 2022-08-04 PROCEDURE — 1101F PR PT FALLS ASSESS DOC 0-1 FALLS W/OUT INJ PAST YR: ICD-10-PCS | Mod: CPTII,S$GLB,, | Performed by: INTERNAL MEDICINE

## 2022-08-04 PROCEDURE — 1157F ADVNC CARE PLAN IN RCRD: CPT | Mod: CPTII,S$GLB,, | Performed by: INTERNAL MEDICINE

## 2022-08-04 PROCEDURE — 99214 PR OFFICE/OUTPT VISIT, EST, LEVL IV, 30-39 MIN: ICD-10-PCS | Mod: S$GLB,,, | Performed by: INTERNAL MEDICINE

## 2022-08-04 PROCEDURE — 1126F AMNT PAIN NOTED NONE PRSNT: CPT | Mod: CPTII,S$GLB,, | Performed by: INTERNAL MEDICINE

## 2022-08-04 PROCEDURE — 1101F PT FALLS ASSESS-DOCD LE1/YR: CPT | Mod: CPTII,S$GLB,, | Performed by: INTERNAL MEDICINE

## 2022-08-04 PROCEDURE — 1157F PR ADVANCE CARE PLAN OR EQUIV PRESENT IN MEDICAL RECORD: ICD-10-PCS | Mod: CPTII,S$GLB,, | Performed by: INTERNAL MEDICINE

## 2022-08-04 NOTE — PROGRESS NOTES
Subjective:    Patient ID:  Nany Crenshaw is a 89 y.o. female who presents for follow-up of Follow-up and Skin Discoloration      HPI     Recurrent RLE DVT - on eliquis, HTN, HLD, CAD, LBBB     Previously followed by Dr Noe  Here for follow-up of DVT and coronary artery disease.  She's had some inflammation and prominence of her varicosities in the right leg.  She's mainly worried about recurrence of clot of blood thinners.  She denies any worsening cardiopulmonary complaints.  She's not had any chest pain, shortness of breath or palpitations.  She denies any PND, orthopnea or lower edema.  She is not expressing dizziness, presyncope or syncope.     Here for follow-up of DVT and coronary artery disease.  She still has some prominence in varicosities in her legs.  She says at times it gets red around her right ankle but does not cause her pain.  She tolerated her blood thinner as had no problems.  She recently went to [facility] without any issues on the airplane.  Do a trip to [place] planned as well.  She denies any PND, orthopnea or lower extremity edema otherwise.  She's not expressing dizziness, presyncope or syncope.     -cont med tx  -cont to follow sx mainly  -Diet and exercise tolerated  -On Eliquis for OAC indefinitely for recurrent DVT     LE venous US 6/1/22  Thrombosis is seen within the left iliac, common femoral, femoral, popliteal, and peroneal veins.  No evidence of clot involving the right common femoral vein or left greater saphenous and anterior and posterior tibial veins.       lower extremity ultrasound:   11-17  Nonocclusive thrombus in the right femoral vein and right popliteal vein.     Echo 5/24/21  · The left ventricle is normal in size with mild concentric hypertrophy and normal systolic function.  · The estimated ejection fraction is 65%.  · Normal right ventricular size with normal right ventricular systolic function.  · Mild aortic regurgitation.  · Mild tricuspid regurgitation.  · The  estimated PA systolic pressure is 33 mmHg.     Event monitor 5/18/21  · Negative event monitor with no clinical arrhythmias.  · Symptoms corresponding with normal sinus rhythm.            Carotid ultrasound:  5-18  CONCLUSIONS   There is 20 - 39% right Internal Carotid stenosis.  There is 20 - 39% left Internal Carotid stenosis.     Lower extremity arterial ultrasound:  Impression:  1-  no hemodynamically significant plaque bilaterally     Lower extremity venous ultrasound:  TEST DESCRIPTION   Impression:    RIGHT:  Chronic occlusive DVT of the Femoral Vein.    LEFT:  No evidence of Left lower extremity DVT.       1/6/20 Daughter provided translation  Denies CP or SOB  Walks with a walker at home  EKG NSR LBBB - no change     Went to the ER 4/4/21  This is a 89-year-old  female presents to the emergency room with her grandson who is a 1st year resident at [employer] studying Internal Medicine.  The patient was taking off her slippers and she slipped and fell striking the left side of her thorax.  The patient complains of pain in the left lateral thorax and the left lateral thoracic back to midline spine is spared along its entire course.  There is no shortness of breath or precordial chest discomfort the patient has no abdominal complaints.  She is essentially otherwise well without other injuries or problems she has no extremity injuries.  There is no known head trauma.  The patient notes no abrasions contusions or tender areas on her scalp.  She has no neck pain.  She has no neurologic deficits.  There is no cough or difficulty breathing.        5/14/21 Reports several episodes of weakness with near syncope over the last few weeks  EKG NSR LBBB - no change. Denies CP or SOB   Echo and event monitor for near syncopal spells  Needs to go to the ER if symptoms worsen     6/21/21 Denies CP or SOB. Still with dizzy spells - denies LOC or falls   Event monitor negative for arrhythmias during dizzy spells  Echo with  good EF  Continue to observe - could decrease lisinopril or metoprolol dose if dizzy spells persist  Decrease eliquis 2.5 bid  Continue Rx for HTN, HLD, DVT, CAD      10/1/21 Denies CP, SOB, or dizziness. Mild ankle edema  EKG NSR LBBB  PRN lasix for LE edema    Continue Rx for HTN, HLD, DVT, CAD   OV 6 months     Went to the ER 6/2/22  HPI: This is a 89 y.o.female patient, with a PMHx of DVT, CAD and HTN , presenting to the ED for follow up from her ED visit yesterday regarding swelling of the left leg. Patient's daughter reports patient was here yesterday and diagnosed with DVT to her left leg. Daughter reports patient was told to come back this morning for her 2nd Lovenox injection and to be set up with home health. Daughter reports the patient's leg swelling has decreased and has gotten better since the administration of the first injection last night in the ED. Patient denies any other complications. Patient denies any history of blood clots in the lungs. Patient denies any shortness of breath, chest pain, vomiting, or any other associated symptoms. No known drug allergies.     6/22/22 Went back to eliquis after taking lovenox for 2 weeks. LLE still with mild erythema and swelling. Denies CP or SOB  EKG NSR LBBB   Recurrent DVT on eliquis  Stop eliquis  Start coumadin 5 mg qd  Refer to coumadin clinic  Bridge with lovenox 60 mg bid until INR  > 2  OV 1 month  Continue Rx for HTN, HLD, CAD      8/4/22 INR in range on coumadin. Having some chronic skin changes in LE bilaterally  Denies CP or SOB        Review of Systems   Constitutional: Negative for decreased appetite.   HENT: Negative for ear discharge.    Eyes: Negative for blurred vision.   Respiratory: Negative for hemoptysis.    Endocrine: Negative for polyphagia.   Hematologic/Lymphatic: Negative for adenopathy.   Skin: Negative for color change.   Musculoskeletal: Negative for joint swelling.   Genitourinary: Negative for bladder incontinence.    Neurological: Negative for brief paralysis.   Psychiatric/Behavioral: Negative for hallucinations.   Allergic/Immunologic: Negative for hives.        Objective:    Physical Exam  Constitutional:       Appearance: She is well-developed.   HENT:      Head: Normocephalic and atraumatic.   Eyes:      Conjunctiva/sclera: Conjunctivae normal.      Pupils: Pupils are equal, round, and reactive to light.   Neck:      Thyroid: No thyromegaly.   Cardiovascular:      Rate and Rhythm: Normal rate and regular rhythm.      Heart sounds: No murmur heard.  Pulmonary:      Effort: Pulmonary effort is normal. No respiratory distress.      Breath sounds: Normal breath sounds.   Abdominal:      General: Bowel sounds are normal.      Palpations: Abdomen is soft.   Musculoskeletal:         General: Normal range of motion.      Cervical back: Normal range of motion and neck supple.      Comments: Prominent varicosities with some inflammation right greater than left   Skin:     General: Skin is warm and dry.   Neurological:      Mental Status: She is alert and oriented to person, place, and time.   Psychiatric:         Behavior: Behavior normal.           Assessment:       1. Pure hypercholesterolemia    2. Peripheral vascular disease, unspecified    3. Coronary artery disease involving native coronary artery of native heart without angina pectoris    4. Benign hypertension         Plan:       Recommended hydrating lotion for chronic skin changes  Continue Rx for HTN, HLD, CAD, DVT  OV 6 months

## 2022-08-04 NOTE — PROGRESS NOTES
Verbal result taken from [facility]_________. PT/INR __2.6_____ Date drawn_08/04/22_______ Hardcopy to be faxed.

## 2022-08-18 ENCOUNTER — LAB VISIT (OUTPATIENT)
Dept: LAB | Facility: HOSPITAL | Age: 87
End: 2022-08-18
Attending: INTERNAL MEDICINE
Payer: MEDICARE

## 2022-08-18 ENCOUNTER — ANTI-COAG VISIT (OUTPATIENT)
Dept: CARDIOLOGY | Facility: CLINIC | Age: 87
End: 2022-08-18
Payer: MEDICARE

## 2022-08-18 DIAGNOSIS — Z79.01 LONG TERM (CURRENT) USE OF ANTICOAGULANTS: ICD-10-CM

## 2022-08-18 DIAGNOSIS — I82.421 DEEP VEIN THROMBOSIS (DVT) OF ILIAC VEIN OF RIGHT LOWER EXTREMITY, UNSPECIFIED CHRONICITY: Primary | ICD-10-CM

## 2022-08-18 DIAGNOSIS — I82.421 DEEP VEIN THROMBOSIS (DVT) OF ILIAC VEIN OF RIGHT LOWER EXTREMITY, UNSPECIFIED CHRONICITY: ICD-10-CM

## 2022-08-18 LAB
INR PPP: 1.6 (ref 0.8–1.2)
PROTHROMBIN TIME: 16.9 SEC (ref 9–12.5)

## 2022-08-18 PROCEDURE — 93793 PR ANTICOAGULANT MGMT FOR PT TAKING WARFARIN: ICD-10-PCS | Mod: S$GLB,,,

## 2022-08-18 PROCEDURE — 93793 ANTICOAG MGMT PT WARFARIN: CPT | Mod: S$GLB,,,

## 2022-08-18 PROCEDURE — 85610 PROTHROMBIN TIME: CPT | Performed by: INTERNAL MEDICINE

## 2022-08-18 PROCEDURE — 36415 COLL VENOUS BLD VENIPUNCTURE: CPT | Mod: PO | Performed by: INTERNAL MEDICINE

## 2022-08-22 ENCOUNTER — OFFICE VISIT (OUTPATIENT)
Dept: DERMATOLOGY | Facility: CLINIC | Age: 87
End: 2022-08-22
Payer: MEDICARE

## 2022-08-22 DIAGNOSIS — L98.9 SKIN LESIONS: ICD-10-CM

## 2022-08-22 DIAGNOSIS — L57.0 ACTINIC KERATOSES: ICD-10-CM

## 2022-08-22 DIAGNOSIS — L85.3 XEROSIS CUTIS: ICD-10-CM

## 2022-08-22 DIAGNOSIS — D48.5 NEOPLASM OF UNCERTAIN BEHAVIOR OF SKIN: Primary | ICD-10-CM

## 2022-08-22 PROCEDURE — 88305 TISSUE EXAM BY PATHOLOGIST: ICD-10-PCS | Mod: 26,,, | Performed by: PATHOLOGY

## 2022-08-22 PROCEDURE — 99203 PR OFFICE/OUTPT VISIT, NEW, LEVL III, 30-44 MIN: ICD-10-PCS | Mod: 25,S$GLB,, | Performed by: DERMATOLOGY

## 2022-08-22 PROCEDURE — 17003 DESTRUCTION, PREMALIGNANT LESIONS; SECOND THROUGH 14 LESIONS: ICD-10-PCS | Mod: GC,S$GLB,, | Performed by: STUDENT IN AN ORGANIZED HEALTH CARE EDUCATION/TRAINING PROGRAM

## 2022-08-22 PROCEDURE — 99999 PR PBB SHADOW E&M-EST. PATIENT-LVL III: CPT | Mod: PBBFAC,,,

## 2022-08-22 PROCEDURE — 1157F PR ADVANCE CARE PLAN OR EQUIV PRESENT IN MEDICAL RECORD: ICD-10-PCS | Mod: CPTII,S$GLB,, | Performed by: DERMATOLOGY

## 2022-08-22 PROCEDURE — 11103 TANGNTL BX SKIN EA SEP/ADDL: CPT | Mod: 59,GC,S$GLB, | Performed by: STUDENT IN AN ORGANIZED HEALTH CARE EDUCATION/TRAINING PROGRAM

## 2022-08-22 PROCEDURE — 99203 OFFICE O/P NEW LOW 30 MIN: CPT | Mod: 25,S$GLB,, | Performed by: DERMATOLOGY

## 2022-08-22 PROCEDURE — 88305 TISSUE EXAM BY PATHOLOGIST: CPT | Mod: 59 | Performed by: PATHOLOGY

## 2022-08-22 PROCEDURE — 1157F ADVNC CARE PLAN IN RCRD: CPT | Mod: CPTII,S$GLB,, | Performed by: DERMATOLOGY

## 2022-08-22 PROCEDURE — 11102 PR TANGENTIAL BIOPSY, SKIN, SINGLE LESION: ICD-10-PCS | Mod: GC,S$GLB,, | Performed by: STUDENT IN AN ORGANIZED HEALTH CARE EDUCATION/TRAINING PROGRAM

## 2022-08-22 PROCEDURE — 11103 PR TANGENTIAL BIOPSY, SKIN, EA ADDTL LESION: ICD-10-PCS | Mod: 59,GC,S$GLB, | Performed by: STUDENT IN AN ORGANIZED HEALTH CARE EDUCATION/TRAINING PROGRAM

## 2022-08-22 PROCEDURE — 17003 DESTRUCT PREMALG LES 2-14: CPT | Mod: GC,S$GLB,, | Performed by: STUDENT IN AN ORGANIZED HEALTH CARE EDUCATION/TRAINING PROGRAM

## 2022-08-22 PROCEDURE — 17000 PR DESTRUCTION(LASER SURGERY,CRYOSURGERY,CHEMOSURGERY),PREMALIGNANT LESIONS,FIRST LESION: ICD-10-PCS | Mod: 59,GC,S$GLB, | Performed by: STUDENT IN AN ORGANIZED HEALTH CARE EDUCATION/TRAINING PROGRAM

## 2022-08-22 PROCEDURE — 99999 PR PBB SHADOW E&M-EST. PATIENT-LVL III: ICD-10-PCS | Mod: PBBFAC,,,

## 2022-08-22 PROCEDURE — 88305 TISSUE EXAM BY PATHOLOGIST: CPT | Mod: 26,,, | Performed by: PATHOLOGY

## 2022-08-22 PROCEDURE — 11102 TANGNTL BX SKIN SINGLE LES: CPT | Mod: GC,S$GLB,, | Performed by: STUDENT IN AN ORGANIZED HEALTH CARE EDUCATION/TRAINING PROGRAM

## 2022-08-22 PROCEDURE — 17000 DESTRUCT PREMALG LESION: CPT | Mod: 59,GC,S$GLB, | Performed by: STUDENT IN AN ORGANIZED HEALTH CARE EDUCATION/TRAINING PROGRAM

## 2022-08-22 NOTE — PATIENT INSTRUCTIONS
Skin care for lower legs:  -use lukewarm to warm water for showers/baths for no more than 10-15 minutes  -use gentle soap such as Dove sensitive skin or Cetaphil  -pat dry; do not wipe dry  -then apply CeraVe moisturizing cream to the lower legs and leave on overnight

## 2022-08-22 NOTE — PROGRESS NOTES
Subjective:       Patient ID:  Nany Crenshaw is a 89 y.o. female who presents for   Chief Complaint   Patient presents with    Spot     Ms. Crenshaw is a 89yo F who presents for evaluation of lesions on her arms and face. She also would like her lower legs looked at. She has had these lesions on her arm for the past few months. There is one on the distal right arm that has a large amount of crust on it. She states it is painful. She has other red lesions on her right arm. On her left arm, there area scattered bumpy lesions that worry her and she would like examined. These are not painful. She also has a lesion on her forehead that has been there for years and is not painful but she tries to cover it up. Her lower legs are dry and itchy. She has venous problems and she also uses warm water, an unknown soap and no moisturizer. She states her lower legs are occasionally itchy.    Here with her daughter today - who reports she has seen derm in the past for removal of growths but has not been seen in past 10-20 years at least.    Review of Systems   Skin: Positive for itching, rash and dry skin.        Objective:    Physical Exam   Constitutional: She appears well-developed and well-nourished.   Neurological: She is alert.   Skin:   Areas Examined (abnormalities noted in diagram):   Head / Face Inspection Performed  RUE Inspected  LUE Inspection Performed  RLE Inspected  LLE Inspection Performed                   Diagram Legend     Erythematous scaling macule/papule c/w actinic keratosis       Vascular papule c/w angioma      Pigmented verrucoid papule/plaque c/w seborrheic keratosis      Yellow umbilicated papule c/w sebaceous hyperplasia      Irregularly shaped tan macule c/w lentigo     1-2 mm smooth white papules consistent with Milia      Movable subcutaneous cyst with punctum c/w epidermal inclusion cyst      Subcutaneous movable cyst c/w pilar cyst      Firm pink to brown papule c/w dermatofibroma       Pedunculated fleshy papule(s) c/w skin tag(s)      Evenly pigmented macule c/w junctional nevus     Mildly variegated pigmented, slightly irregular-bordered macule c/w mildly atypical nevus      Flesh colored to evenly pigmented papule c/w intradermal nevus       Pink pearly papule/plaque c/w basal cell carcinoma      Erythematous hyperkeratotic cursted plaque c/w SCC      Surgical scar with no sign of skin cancer recurrence      Open and closed comedones      Inflammatory papules and pustules      Verrucoid papule consistent consistent with wart     Erythematous eczematous patches and plaques     Dystrophic onycholytic nail with subungual debris c/w onychomycosis     Umbilicated papule    Erythematous-base heme-crusted tan verrucoid plaque consistent with inflamed seborrheic keratosis     Erythematous Silvery Scaling Plaque c/w Psoriasis     See annotation          Forehead              Right arm  Assessment / Plan:      Pathology Orders:     Normal Orders This Visit    Specimen to Pathology, Dermatology     Questions:    Procedure Type: Dermatology and skin neoplasms    Number of Specimens: 2    ------------------------: -------------------------    Spec 1 Procedure: Biopsy    Spec 1 Clinical Impression: ddx: SCC vs. BCC vs. SK vs. other    Spec 1 Source: forehead    ------------------------: -------------------------    Spec 2 Procedure: Biopsy    Spec 2 Clinical Impression: ddx: SCC vs. BCC vs. other    Spec 2 Source: right arm    Clinical Information: 89yo F who presents for evaluation of a few skin lesions. there is one on her forehead that has been there a while and is an erythematous papule    Release to patient: Immediate        Neoplasm of uncertain behavior of skin  -     Cancel: Specimen to Pathology, Dermatology  -     Specimen to Pathology, Dermatology  -two NUBs; 1 on forehead and 1 on right forearm    If biopsy positive for malignancy, refer to Dr. Turner for Mohs surgery consultation.    -shave  biopsies performed to these lesions, see procedure note below  -written and verbal instructions for wound care were given today  -will notify patient in 1-2 weeks of biopsy report and if further treatment to these areas is needed    Shave biopsy procedure note:    Shave biopsy performed after verbal consent including risk of infection, scar, recurrence, need for additional treatment of site. Area prepped with alcohol, anesthetized with approximately 1.0cc of 1% lidocaine with epinephrine. Lesional tissue shaved with razor blade. Hemostasis achieved with application of aluminum chloride followed by hyfrecation. No complications. Dressing applied. Wound care explained.      Skin lesions - pt with other hyperkeratotic and actinic papules and plaques that may require treatment -   -     Ambulatory referral/consult to Dermatology  -will need TBSE regularly  -will schedule TBSE in 3-4 months    Actinic keratoses  -3 AKs treated with LN today  -see below for procedure note  -Cryosurgery to 3 actinic keratoses - left arm. Discussed with patient that areas treated with blister (possibly for blood blister), scab and peel off within the next two to three weeks.  Cryosurgery Procedure Note    Verbal consent from the patient is obtained including, but not limited to, risk of hypopigmentation/hyperpigmentation, scar, recurrence of lesion. The patient is aware of the precancerous quality and need for treatment of these lesions. Liquid nitrogen cryosurgery is applied to the 3 actinic keratoses, as detailed in the physical exam, to produce a freeze injury. The patient is aware that blisters may form and is instructed on wound care with gentle cleansing and use of vaseline ointment to keep moist until healed. The patient is supplied a handout on cryosurgery and is instructed to call if lesions do not completely resolve.    Xerosis cutis  Skin care for lower legs:  -use lukewarm to warm water for showers/baths for no more than 10-15  minutes  -use gentle soap such as Dove sensitive skin or Cetaphil  -pat dry; do not wipe dry  -then apply CeraVe moisturizing cream to the lower legs and leave on overnight    Caregiver was in the room with patient and provided translation for patient.       Madeleine Hendrickson MD  [facility] Dermatology, PGY-III

## 2022-08-26 ENCOUNTER — PATIENT MESSAGE (OUTPATIENT)
Dept: DERMATOLOGY | Facility: CLINIC | Age: 87
End: 2022-08-26
Payer: MEDICARE

## 2022-08-26 LAB
FINAL PATHOLOGIC DIAGNOSIS: NORMAL
GROSS: NORMAL
Lab: NORMAL
MICROSCOPIC EXAM: NORMAL

## 2022-08-30 ENCOUNTER — TELEPHONE (OUTPATIENT)
Dept: DERMATOLOGY | Facility: CLINIC | Age: 87
End: 2022-08-30
Payer: MEDICARE

## 2022-08-30 NOTE — PROGRESS NOTES
1. 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 You will be contacted regarding treatment.   2.  Skin, right arm, shave biopsy:   -SQUAMOUS CELL CARCINOMA IN-SITU, EXTENDING TO A PERIPHERAL BIOPSY EDGE   This lesion is skin cancer. You will be contacted regarding treatment.     Notified patient and messaged Dr. Turner's staff to have patient scheduled.

## 2022-08-30 NOTE — TELEPHONE ENCOUNTER
----- Message from Madeleine Hendrickson MD sent at 8/30/2022  9:25 AM CDT -----  Regarding: schedule patient for Mohs  Hi,     This patient needs to be scheduled for Mohs surgery. She has two SCCis that we biopsied. She has been notified of the pathology results.     Thanks,   Madeleine Hendrickson MD  [facility] Dermatology, PGY-III

## 2022-09-02 ENCOUNTER — OFFICE VISIT (OUTPATIENT)
Dept: PODIATRY | Facility: CLINIC | Age: 87
End: 2022-09-02
Payer: MEDICARE

## 2022-09-02 ENCOUNTER — ANTI-COAG VISIT (OUTPATIENT)
Dept: CARDIOLOGY | Facility: CLINIC | Age: 87
End: 2022-09-02
Payer: MEDICARE

## 2022-09-02 ENCOUNTER — TELEPHONE (OUTPATIENT)
Dept: DERMATOLOGY | Facility: CLINIC | Age: 87
End: 2022-09-02
Payer: MEDICARE

## 2022-09-02 ENCOUNTER — LAB VISIT (OUTPATIENT)
Dept: LAB | Facility: HOSPITAL | Age: 87
End: 2022-09-02
Attending: INTERNAL MEDICINE
Payer: MEDICARE

## 2022-09-02 VITALS
HEIGHT: 56 IN | DIASTOLIC BLOOD PRESSURE: 67 MMHG | HEART RATE: 60 BPM | SYSTOLIC BLOOD PRESSURE: 118 MMHG | BODY MASS INDEX: 24.75 KG/M2 | WEIGHT: 110 LBS

## 2022-09-02 DIAGNOSIS — I73.9 PERIPHERAL VASCULAR DISEASE, UNSPECIFIED: ICD-10-CM

## 2022-09-02 DIAGNOSIS — Z79.01 LONG TERM CURRENT USE OF ANTICOAGULANT THERAPY: Primary | ICD-10-CM

## 2022-09-02 DIAGNOSIS — I82.421 DEEP VEIN THROMBOSIS (DVT) OF ILIAC VEIN OF RIGHT LOWER EXTREMITY, UNSPECIFIED CHRONICITY: ICD-10-CM

## 2022-09-02 DIAGNOSIS — Z79.01 LONG TERM (CURRENT) USE OF ANTICOAGULANTS: ICD-10-CM

## 2022-09-02 DIAGNOSIS — I82.421 DEEP VEIN THROMBOSIS (DVT) OF ILIAC VEIN OF RIGHT LOWER EXTREMITY, UNSPECIFIED CHRONICITY: Primary | ICD-10-CM

## 2022-09-02 DIAGNOSIS — B35.1 ONYCHOMYCOSIS DUE TO DERMATOPHYTE: ICD-10-CM

## 2022-09-02 LAB
INR PPP: 1.6 (ref 0.8–1.2)
PROTHROMBIN TIME: 17.1 SEC (ref 9–12.5)

## 2022-09-02 PROCEDURE — 1101F PT FALLS ASSESS-DOCD LE1/YR: CPT | Mod: CPTII,S$GLB,, | Performed by: PODIATRIST

## 2022-09-02 PROCEDURE — 3288F PR FALLS RISK ASSESSMENT DOCUMENTED: ICD-10-PCS | Mod: CPTII,S$GLB,, | Performed by: PODIATRIST

## 2022-09-02 PROCEDURE — 1159F MED LIST DOCD IN RCRD: CPT | Mod: CPTII,S$GLB,, | Performed by: PODIATRIST

## 2022-09-02 PROCEDURE — 93793 ANTICOAG MGMT PT WARFARIN: CPT | Mod: S$GLB,,,

## 2022-09-02 PROCEDURE — 1126F PR PAIN SEVERITY QUANTIFIED, NO PAIN PRESENT: ICD-10-PCS | Mod: CPTII,S$GLB,, | Performed by: PODIATRIST

## 2022-09-02 PROCEDURE — 1159F PR MEDICATION LIST DOCUMENTED IN MEDICAL RECORD: ICD-10-PCS | Mod: CPTII,S$GLB,, | Performed by: PODIATRIST

## 2022-09-02 PROCEDURE — 1101F PR PT FALLS ASSESS DOC 0-1 FALLS W/OUT INJ PAST YR: ICD-10-PCS | Mod: CPTII,S$GLB,, | Performed by: PODIATRIST

## 2022-09-02 PROCEDURE — 3288F FALL RISK ASSESSMENT DOCD: CPT | Mod: CPTII,S$GLB,, | Performed by: PODIATRIST

## 2022-09-02 PROCEDURE — 1126F AMNT PAIN NOTED NONE PRSNT: CPT | Mod: CPTII,S$GLB,, | Performed by: PODIATRIST

## 2022-09-02 PROCEDURE — 1157F PR ADVANCE CARE PLAN OR EQUIV PRESENT IN MEDICAL RECORD: ICD-10-PCS | Mod: CPTII,S$GLB,, | Performed by: PODIATRIST

## 2022-09-02 PROCEDURE — 99999 PR PBB SHADOW E&M-EST. PATIENT-LVL III: CPT | Mod: PBBFAC,,, | Performed by: PODIATRIST

## 2022-09-02 PROCEDURE — 99999 PR PBB SHADOW E&M-EST. PATIENT-LVL III: ICD-10-PCS | Mod: PBBFAC,,, | Performed by: PODIATRIST

## 2022-09-02 PROCEDURE — 11721 DEBRIDE NAIL 6 OR MORE: CPT | Mod: Q9,S$GLB,, | Performed by: PODIATRIST

## 2022-09-02 PROCEDURE — 99499 UNLISTED E&M SERVICE: CPT | Mod: S$GLB,,, | Performed by: PODIATRIST

## 2022-09-02 PROCEDURE — 11721 PR DEBRIDEMENT OF NAILS, 6 OR MORE: ICD-10-PCS | Mod: Q9,S$GLB,, | Performed by: PODIATRIST

## 2022-09-02 PROCEDURE — 93793 PR ANTICOAGULANT MGMT FOR PT TAKING WARFARIN: ICD-10-PCS | Mod: S$GLB,,,

## 2022-09-02 PROCEDURE — 1157F ADVNC CARE PLAN IN RCRD: CPT | Mod: CPTII,S$GLB,, | Performed by: PODIATRIST

## 2022-09-02 PROCEDURE — 85610 PROTHROMBIN TIME: CPT | Performed by: INTERNAL MEDICINE

## 2022-09-02 PROCEDURE — 99499 NO LOS: ICD-10-PCS | Mod: S$GLB,,, | Performed by: PODIATRIST

## 2022-09-02 PROCEDURE — 36415 COLL VENOUS BLD VENIPUNCTURE: CPT | Mod: PO | Performed by: INTERNAL MEDICINE

## 2022-09-02 NOTE — TELEPHONE ENCOUNTER
----- Message from Marylu Callahan sent at 9/2/2022  9:48 AM CDT -----  Pt daughter returning call to  office       Confirmed patient's contact info below:  Contact Name: Nany Crenshaw  Phone Number: [phone]

## 2022-09-02 NOTE — TELEPHONE ENCOUNTER
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 Pt was scheduled for Mohs sx on 10/26/22 at 8:10 and one week later on 11/2/2022 at 11:30. Pt daughter verbally confirmed appt dates and times.

## 2022-09-08 ENCOUNTER — LAB VISIT (OUTPATIENT)
Dept: LAB | Facility: HOSPITAL | Age: 87
End: 2022-09-08
Attending: INTERNAL MEDICINE
Payer: MEDICARE

## 2022-09-08 DIAGNOSIS — I82.421 DEEP VEIN THROMBOSIS (DVT) OF ILIAC VEIN OF RIGHT LOWER EXTREMITY, UNSPECIFIED CHRONICITY: ICD-10-CM

## 2022-09-08 LAB
INR PPP: 2.3 (ref 0.8–1.2)
PROTHROMBIN TIME: 24 SEC (ref 9–12.5)

## 2022-09-08 PROCEDURE — 85610 PROTHROMBIN TIME: CPT | Performed by: INTERNAL MEDICINE

## 2022-09-08 PROCEDURE — 36415 COLL VENOUS BLD VENIPUNCTURE: CPT | Mod: PO | Performed by: INTERNAL MEDICINE

## 2022-09-09 ENCOUNTER — ANTI-COAG VISIT (OUTPATIENT)
Dept: CARDIOLOGY | Facility: CLINIC | Age: 87
End: 2022-09-09
Payer: MEDICARE

## 2022-09-09 DIAGNOSIS — Z79.01 LONG TERM (CURRENT) USE OF ANTICOAGULANTS: ICD-10-CM

## 2022-09-09 DIAGNOSIS — I48.11 LONGSTANDING PERSISTENT ATRIAL FIBRILLATION: ICD-10-CM

## 2022-09-09 DIAGNOSIS — I82.421 DEEP VEIN THROMBOSIS (DVT) OF ILIAC VEIN OF RIGHT LOWER EXTREMITY, UNSPECIFIED CHRONICITY: Primary | ICD-10-CM

## 2022-09-09 PROCEDURE — 93793 PR ANTICOAGULANT MGMT FOR PT TAKING WARFARIN: ICD-10-PCS | Mod: S$GLB,,,

## 2022-09-09 PROCEDURE — 93793 ANTICOAG MGMT PT WARFARIN: CPT | Mod: S$GLB,,,

## 2022-09-09 NOTE — PROGRESS NOTES
Daughter Marisol called and she was given coumadin instructions and redraw date, she verbalized understanding

## 2022-09-15 ENCOUNTER — LAB VISIT (OUTPATIENT)
Dept: LAB | Facility: HOSPITAL | Age: 87
End: 2022-09-15
Attending: INTERNAL MEDICINE
Payer: MEDICARE

## 2022-09-15 DIAGNOSIS — I82.421 DEEP VEIN THROMBOSIS (DVT) OF ILIAC VEIN OF RIGHT LOWER EXTREMITY, UNSPECIFIED CHRONICITY: ICD-10-CM

## 2022-09-15 LAB
INR PPP: 2.1 (ref 0.8–1.2)
PROTHROMBIN TIME: 21.4 SEC (ref 9–12.5)

## 2022-09-15 PROCEDURE — 36415 COLL VENOUS BLD VENIPUNCTURE: CPT | Mod: PO | Performed by: INTERNAL MEDICINE

## 2022-09-15 PROCEDURE — 85610 PROTHROMBIN TIME: CPT | Performed by: INTERNAL MEDICINE

## 2022-09-16 ENCOUNTER — ANTI-COAG VISIT (OUTPATIENT)
Dept: CARDIOLOGY | Facility: CLINIC | Age: 87
End: 2022-09-16
Payer: MEDICARE

## 2022-09-16 ENCOUNTER — PATIENT MESSAGE (OUTPATIENT)
Dept: INTERNAL MEDICINE | Facility: CLINIC | Age: 87
End: 2022-09-16
Payer: MEDICARE

## 2022-09-16 DIAGNOSIS — I82.421 DEEP VEIN THROMBOSIS (DVT) OF ILIAC VEIN OF RIGHT LOWER EXTREMITY, UNSPECIFIED CHRONICITY: Primary | ICD-10-CM

## 2022-09-16 DIAGNOSIS — I48.11 LONGSTANDING PERSISTENT ATRIAL FIBRILLATION: ICD-10-CM

## 2022-09-16 DIAGNOSIS — Z79.01 LONG TERM (CURRENT) USE OF ANTICOAGULANTS: ICD-10-CM

## 2022-09-16 PROCEDURE — 93793 PR ANTICOAGULANT MGMT FOR PT TAKING WARFARIN: ICD-10-PCS | Mod: S$GLB,,, | Performed by: PHARMACIST

## 2022-09-16 PROCEDURE — 93793 ANTICOAG MGMT PT WARFARIN: CPT | Mod: S$GLB,,, | Performed by: PHARMACIST

## 2022-09-16 RX ORDER — EZETIMIBE 10 MG/1
10 TABLET ORAL DAILY
Qty: 90 TABLET | Refills: 1 | Status: SHIPPED | OUTPATIENT
Start: 2022-09-16 | End: 2023-03-04 | Stop reason: SDUPTHER

## 2022-09-16 NOTE — TELEPHONE ENCOUNTER
No new care gaps identified.  [facility] Embedded Care Gaps. Reference number: [id number]. 9/16/2022   9:06:17 AM JOHANNYT

## 2022-09-21 ENCOUNTER — LAB VISIT (OUTPATIENT)
Dept: LAB | Facility: HOSPITAL | Age: 87
End: 2022-09-21
Attending: EMERGENCY MEDICINE
Payer: MEDICARE

## 2022-09-21 ENCOUNTER — ANTI-COAG VISIT (OUTPATIENT)
Dept: CARDIOLOGY | Facility: CLINIC | Age: 87
End: 2022-09-21
Payer: MEDICARE

## 2022-09-21 DIAGNOSIS — I82.421 DEEP VEIN THROMBOSIS (DVT) OF ILIAC VEIN OF RIGHT LOWER EXTREMITY, UNSPECIFIED CHRONICITY: Primary | ICD-10-CM

## 2022-09-21 DIAGNOSIS — Z79.01 LONG TERM (CURRENT) USE OF ANTICOAGULANTS: ICD-10-CM

## 2022-09-21 DIAGNOSIS — I48.11 LONGSTANDING PERSISTENT ATRIAL FIBRILLATION: ICD-10-CM

## 2022-09-21 DIAGNOSIS — I82.421 DEEP VEIN THROMBOSIS (DVT) OF ILIAC VEIN OF RIGHT LOWER EXTREMITY, UNSPECIFIED CHRONICITY: ICD-10-CM

## 2022-09-21 LAB
INR PPP: 2.4 (ref 0.8–1.2)
PROTHROMBIN TIME: 25.1 SEC (ref 9–12.5)

## 2022-09-21 PROCEDURE — 93793 ANTICOAG MGMT PT WARFARIN: CPT | Mod: S$GLB,,,

## 2022-09-21 PROCEDURE — 93793 PR ANTICOAGULANT MGMT FOR PT TAKING WARFARIN: ICD-10-PCS | Mod: S$GLB,,,

## 2022-09-21 PROCEDURE — 36415 COLL VENOUS BLD VENIPUNCTURE: CPT | Mod: PO | Performed by: INTERNAL MEDICINE

## 2022-09-21 PROCEDURE — 85610 PROTHROMBIN TIME: CPT | Performed by: INTERNAL MEDICINE

## 2022-10-05 ENCOUNTER — LAB VISIT (OUTPATIENT)
Dept: LAB | Facility: HOSPITAL | Age: 87
End: 2022-10-05
Attending: INTERNAL MEDICINE
Payer: MEDICARE

## 2022-10-05 DIAGNOSIS — I82.421 DEEP VEIN THROMBOSIS (DVT) OF ILIAC VEIN OF RIGHT LOWER EXTREMITY, UNSPECIFIED CHRONICITY: ICD-10-CM

## 2022-10-05 LAB
INR PPP: 1.5 (ref 0.8–1.2)
PROTHROMBIN TIME: 16 SEC (ref 9–12.5)

## 2022-10-05 PROCEDURE — 36415 COLL VENOUS BLD VENIPUNCTURE: CPT | Mod: PO | Performed by: INTERNAL MEDICINE

## 2022-10-05 PROCEDURE — 85610 PROTHROMBIN TIME: CPT | Performed by: INTERNAL MEDICINE

## 2022-10-06 ENCOUNTER — ANTI-COAG VISIT (OUTPATIENT)
Dept: CARDIOLOGY | Facility: CLINIC | Age: 87
End: 2022-10-06
Payer: MEDICARE

## 2022-10-06 DIAGNOSIS — I82.421 DEEP VEIN THROMBOSIS (DVT) OF ILIAC VEIN OF RIGHT LOWER EXTREMITY, UNSPECIFIED CHRONICITY: Primary | ICD-10-CM

## 2022-10-06 DIAGNOSIS — Z79.01 LONG TERM (CURRENT) USE OF ANTICOAGULANTS: ICD-10-CM

## 2022-10-06 DIAGNOSIS — I48.11 LONGSTANDING PERSISTENT ATRIAL FIBRILLATION: ICD-10-CM

## 2022-10-06 PROCEDURE — 93793 ANTICOAG MGMT PT WARFARIN: CPT | Mod: S$GLB,,,

## 2022-10-06 PROCEDURE — 93793 PR ANTICOAGULANT MGMT FOR PT TAKING WARFARIN: ICD-10-PCS | Mod: S$GLB,,,

## 2022-10-07 NOTE — PROGRESS NOTES
Janie (daughter) called and she was given lab result, coumadin instructions and redraw date, she verbalized understanding

## 2022-10-15 ENCOUNTER — PATIENT MESSAGE (OUTPATIENT)
Dept: INTERNAL MEDICINE | Facility: CLINIC | Age: 87
End: 2022-10-15
Payer: MEDICARE

## 2022-10-18 ENCOUNTER — LAB VISIT (OUTPATIENT)
Dept: LAB | Facility: HOSPITAL | Age: 87
End: 2022-10-18
Attending: INTERNAL MEDICINE
Payer: MEDICARE

## 2022-10-18 DIAGNOSIS — I82.421 DEEP VEIN THROMBOSIS (DVT) OF ILIAC VEIN OF RIGHT LOWER EXTREMITY, UNSPECIFIED CHRONICITY: ICD-10-CM

## 2022-10-18 LAB
INR PPP: 2.3 (ref 0.8–1.2)
PROTHROMBIN TIME: 23.6 SEC (ref 9–12.5)

## 2022-10-18 PROCEDURE — 36415 COLL VENOUS BLD VENIPUNCTURE: CPT | Mod: PO | Performed by: INTERNAL MEDICINE

## 2022-10-18 PROCEDURE — 85610 PROTHROMBIN TIME: CPT | Performed by: INTERNAL MEDICINE

## 2022-10-19 ENCOUNTER — PATIENT MESSAGE (OUTPATIENT)
Dept: CARDIOLOGY | Facility: CLINIC | Age: 87
End: 2022-10-19

## 2022-10-19 ENCOUNTER — ANTI-COAG VISIT (OUTPATIENT)
Dept: CARDIOLOGY | Facility: CLINIC | Age: 87
End: 2022-10-19
Payer: MEDICARE

## 2022-10-19 DIAGNOSIS — I48.11 LONGSTANDING PERSISTENT ATRIAL FIBRILLATION: ICD-10-CM

## 2022-10-19 DIAGNOSIS — I82.421 DEEP VEIN THROMBOSIS (DVT) OF ILIAC VEIN OF RIGHT LOWER EXTREMITY, UNSPECIFIED CHRONICITY: Primary | ICD-10-CM

## 2022-10-19 DIAGNOSIS — Z79.01 LONG TERM (CURRENT) USE OF ANTICOAGULANTS: ICD-10-CM

## 2022-10-19 PROCEDURE — 93793 ANTICOAG MGMT PT WARFARIN: CPT | Mod: S$GLB,,,

## 2022-10-19 PROCEDURE — 93793 PR ANTICOAGULANT MGMT FOR PT TAKING WARFARIN: ICD-10-PCS | Mod: S$GLB,,,

## 2022-10-26 ENCOUNTER — PROCEDURE VISIT (OUTPATIENT)
Dept: DERMATOLOGY | Facility: CLINIC | Age: 87
End: 2022-10-26
Payer: MEDICARE

## 2022-10-26 VITALS
WEIGHT: 110 LBS | DIASTOLIC BLOOD PRESSURE: 67 MMHG | SYSTOLIC BLOOD PRESSURE: 142 MMHG | BODY MASS INDEX: 24.66 KG/M2 | HEART RATE: 59 BPM

## 2022-10-26 DIAGNOSIS — D04.61 SQUAMOUS CELL CARCINOMA IN SITU (SCCIS) OF SKIN OF RIGHT FOREARM: ICD-10-CM

## 2022-10-26 DIAGNOSIS — D04.39 SQUAMOUS CELL CARCINOMA IN SITU OF SKIN OF FOREHEAD: Primary | ICD-10-CM

## 2022-10-26 PROCEDURE — 17311 MOHS 1 STAGE H/N/HF/G: CPT | Mod: 51,S$GLB,, | Performed by: DERMATOLOGY

## 2022-10-26 PROCEDURE — 13132 PR RECMPL WND HEAD,FAC,HAND 2.6-7.5 CM: ICD-10-PCS | Mod: S$GLB,,, | Performed by: DERMATOLOGY

## 2022-10-26 PROCEDURE — 17311: ICD-10-PCS | Mod: 51,S$GLB,, | Performed by: DERMATOLOGY

## 2022-10-26 PROCEDURE — 99499 NO LOS: ICD-10-PCS | Mod: S$GLB,,, | Performed by: DERMATOLOGY

## 2022-10-26 PROCEDURE — 99499 UNLISTED E&M SERVICE: CPT | Mod: S$GLB,,, | Performed by: DERMATOLOGY

## 2022-10-26 PROCEDURE — 13132 CMPLX RPR F/C/C/M/N/AX/G/H/F: CPT | Mod: S$GLB,,, | Performed by: DERMATOLOGY

## 2022-10-26 RX ORDER — FLUOROURACIL 50 MG/G
CREAM TOPICAL 2 TIMES DAILY
Qty: 60 G | Refills: 2 | Status: SHIPPED | OUTPATIENT
Start: 2022-10-26 | End: 2022-11-16

## 2022-10-26 NOTE — Clinical Note
Hardy - Did Mohs today SCCIS forehead.  Saw her right arm SCCIS, not much clinically present with diffuse AKs.  We are not going to Mohs this one, instead erx'd efudex for her to treat topically given how thin her skin is. Would you be able to see her back in 2 months to recheck site?    Thanks, SSW

## 2022-10-26 NOTE — PROGRESS NOTES
PROCEDURE: Mohs' Micrographic Surgery    INDICATION: Location in mask areas of face including central face, nose, eyelids, eyebrows, lips, chin, preauricular, temple, and ear. Biopsy-proven skin cancer of cosmetically and functionally important areas, including head, neck, genital, hand, foot, or areas known for having difficulty in healing, such as the lower anterior legs. Tumors with aggressive clinical behavior (rapidly growing, greater than 1 cm in diameter). Tumor with ill-defined borders.    REFERRING PROVIDER: Kylee Brown MD    CASE NUMBER:     ANESTHETIC: 3 cc 0.5% Lidocaine with Epi 1:200,000 mixed 1:1 with 0.5% Bupivacaine    SURGICAL PREP: Hibiclens    SURGEON: Jairo Turner MD    ASSISTANTS: Stephanie Valle PA-C and Ronn Godfrey    PREOPERATIVE DIAGNOSIS: squamous cell carcinoma in situ    POSTOPERATIVE DIAGNOSIS: squamous cell carcinoma in situ    PATHOLOGIC DIAGNOSIS: squamous cell carcinoma in situ    HISTOLOGY OF SPECIMENS IN FIRST STAGE:   Tumor Type: No tumor seen. Actinic keratosis: Keratinocyte atypia along the basal layer.    STAGES OF MOHS' SURGERY PERFORMED: 1    TUMOR-FREE PLANE ACHIEVED: Yes    HEMOSTASIS: electrocoagulation     SPECIMENS: 2    LOCATION: forehead. Location verified with Dr. Brown's clinical photograph. Patient also verified location with hand held mirror.    INITIAL LESION SIZE: 0.6 x 0.9 cm    FINAL DEFECT SIZE: 1.3 x 1.6 cm    WOUND REPAIR/DISPOSITION: The patient tolerated Mohs' Micrographic Surgery for a squamous cell carcinoma in situ very well. When the tumor was completely removed, a repair of the surgical defect was undertaken.    PROCEDURE: Complex Linear Repair    INDICATION: Status post Mohs' Micrographic Surgery for squamous cell carcinoma in situ.    CASE NUMBER:     SURGEON: Jairo Turner MD    ASSISTANTS: Stephanie Valle PA-C and Thien Lindsay Surg Denis    ANESTHETIC: 3 cc 0.5% Lidocaine with Epi 1:200,000 mixed 1:1 with 0.5%  Bupivacaine    SURGICAL PREP: Celestine prepped by  Thien Lindsay Surg Tech    LOCATION: forehead    DEFECT SIZE: 1.3 x 1.6 cm    WOUND REPAIR/DISPOSITION:  After the patient's carcinoma had been completely removed with Mohs' Micrographic Surgery, a repair of the surgical defect was undertaken. The patient was returned to the operating suite where the area of forehead was prepped, draped, and anesthetized in the usual sterile fashion. The wound was widely undermined in all directions. The wound was undermined to a distance at least the maximum width of the defect as measured perpendicular to the closure line along at least one entire edge of the defect, in this case 2 cm. Then, electrocoagulation was used to obtain meticulous hemostasis. 4-0 Vicryl buried vertical mattress sutures were placed into the subcutaneous and dermal plane to close the wound and jolly the cutaneous wound edge. Bilateral dog ears were identified and were removed by a standard Burow's triangle technique. The cutaneous wound edges were closed using interrupted 5-0 Prolene suture.    The patient tolerated the procedure well.    The area was cleaned and dressed appropriately and the patient was given wound care instructions, as well as appointment for follow-up evaluation and suture removal in 7 days.    LENGTH OF REPAIR: 4 cm    Vitals:    10/26/22 0807 10/26/22 1113   BP: (!) 147/61 (!) 142/67   Pulse: (!) 53 (!) 59   Weight: 49.9 kg (110 lb)        NOTE: Patient also with biopsy proven SCCIS R arm, path # [id number]. No nodularity, very thin skin. We will treat the right arm with Efudex 5% cream BID x 3 weeks. Discussed redness, crusting, and scabbing. May use Aquaphor as needed. Will have patient follow up with Dr Brown after tx consider Mohs pending progression. eRx'd Efudex 5% cream to preferred [facility] pharmacy today.      Daughter present today for entirety of visit and able to do all translations in [place]ian for her.

## 2022-11-02 ENCOUNTER — OFFICE VISIT (OUTPATIENT)
Dept: DERMATOLOGY | Facility: CLINIC | Age: 87
End: 2022-11-02
Payer: MEDICARE

## 2022-11-02 DIAGNOSIS — Z09 POSTOP CHECK: Primary | ICD-10-CM

## 2022-11-02 PROCEDURE — 99999 PR PBB SHADOW E&M-EST. PATIENT-LVL II: CPT | Mod: PBBFAC,,, | Performed by: DERMATOLOGY

## 2022-11-02 PROCEDURE — 1101F PR PT FALLS ASSESS DOC 0-1 FALLS W/OUT INJ PAST YR: ICD-10-PCS | Mod: CPTII,S$GLB,, | Performed by: DERMATOLOGY

## 2022-11-02 PROCEDURE — 1159F PR MEDICATION LIST DOCUMENTED IN MEDICAL RECORD: ICD-10-PCS | Mod: CPTII,S$GLB,, | Performed by: DERMATOLOGY

## 2022-11-02 PROCEDURE — 3288F PR FALLS RISK ASSESSMENT DOCUMENTED: ICD-10-PCS | Mod: CPTII,S$GLB,, | Performed by: DERMATOLOGY

## 2022-11-02 PROCEDURE — 1101F PT FALLS ASSESS-DOCD LE1/YR: CPT | Mod: CPTII,S$GLB,, | Performed by: DERMATOLOGY

## 2022-11-02 PROCEDURE — 99024 PR POST-OP FOLLOW-UP VISIT: ICD-10-PCS | Mod: S$GLB,,, | Performed by: DERMATOLOGY

## 2022-11-02 PROCEDURE — 99999 PR PBB SHADOW E&M-EST. PATIENT-LVL II: ICD-10-PCS | Mod: PBBFAC,,, | Performed by: DERMATOLOGY

## 2022-11-02 PROCEDURE — 3288F FALL RISK ASSESSMENT DOCD: CPT | Mod: CPTII,S$GLB,, | Performed by: DERMATOLOGY

## 2022-11-02 PROCEDURE — 99024 POSTOP FOLLOW-UP VISIT: CPT | Mod: S$GLB,,, | Performed by: DERMATOLOGY

## 2022-11-02 PROCEDURE — 1159F MED LIST DOCD IN RCRD: CPT | Mod: CPTII,S$GLB,, | Performed by: DERMATOLOGY

## 2022-11-02 PROCEDURE — 1157F ADVNC CARE PLAN IN RCRD: CPT | Mod: CPTII,S$GLB,, | Performed by: DERMATOLOGY

## 2022-11-02 PROCEDURE — 1157F PR ADVANCE CARE PLAN OR EQUIV PRESENT IN MEDICAL RECORD: ICD-10-PCS | Mod: CPTII,S$GLB,, | Performed by: DERMATOLOGY

## 2022-11-02 NOTE — PROGRESS NOTES
89 y.o. female patient is here for suture removal following Mohs' surgery.    Patient reports no problems.    WOUND PE:  The forehead sutures intact. Wound healing well. Good skin edges. No signs or symptoms of infection.      IMPRESSION:  Healing operative site from Mohs' surgery SCC, forehead s/p Mohs with CLC, postop day# 7    PLAN:  Sutures removed today by  Thien Lindsay CST . Steri-strips applied.  Continue wound care.  Keep moist with Aquaphor.    RTC:  In 3-6 months with Kylee Brown MD for skin check or sooner if new concern arises.

## 2022-11-07 ENCOUNTER — EXTERNAL HOME HEALTH (OUTPATIENT)
Dept: HOME HEALTH SERVICES | Facility: HOSPITAL | Age: 87
End: 2022-11-07
Payer: MEDICARE

## 2022-11-08 ENCOUNTER — LAB VISIT (OUTPATIENT)
Dept: LAB | Facility: HOSPITAL | Age: 87
End: 2022-11-08
Attending: INTERNAL MEDICINE
Payer: MEDICARE

## 2022-11-08 DIAGNOSIS — I82.421 DEEP VEIN THROMBOSIS (DVT) OF ILIAC VEIN OF RIGHT LOWER EXTREMITY, UNSPECIFIED CHRONICITY: ICD-10-CM

## 2022-11-08 LAB
INR PPP: 1.7 (ref 0.8–1.2)
PROTHROMBIN TIME: 17.6 SEC (ref 9–12.5)

## 2022-11-08 PROCEDURE — 36415 COLL VENOUS BLD VENIPUNCTURE: CPT | Mod: PO | Performed by: INTERNAL MEDICINE

## 2022-11-08 PROCEDURE — 85610 PROTHROMBIN TIME: CPT | Performed by: INTERNAL MEDICINE

## 2022-11-09 ENCOUNTER — ANTI-COAG VISIT (OUTPATIENT)
Dept: CARDIOLOGY | Facility: CLINIC | Age: 87
End: 2022-11-09
Payer: MEDICARE

## 2022-11-09 DIAGNOSIS — Z79.01 LONG TERM (CURRENT) USE OF ANTICOAGULANTS: ICD-10-CM

## 2022-11-09 DIAGNOSIS — I82.421 DEEP VEIN THROMBOSIS (DVT) OF ILIAC VEIN OF RIGHT LOWER EXTREMITY, UNSPECIFIED CHRONICITY: Primary | ICD-10-CM

## 2022-11-09 DIAGNOSIS — I48.11 LONGSTANDING PERSISTENT ATRIAL FIBRILLATION: ICD-10-CM

## 2022-11-09 PROCEDURE — 93793 PR ANTICOAGULANT MGMT FOR PT TAKING WARFARIN: ICD-10-PCS | Mod: S$GLB,,,

## 2022-11-09 PROCEDURE — 93793 ANTICOAG MGMT PT WARFARIN: CPT | Mod: S$GLB,,,

## 2022-11-21 ENCOUNTER — LAB VISIT (OUTPATIENT)
Dept: LAB | Facility: HOSPITAL | Age: 87
End: 2022-11-21
Payer: MEDICARE

## 2022-11-21 DIAGNOSIS — I82.421 DEEP VEIN THROMBOSIS (DVT) OF ILIAC VEIN OF RIGHT LOWER EXTREMITY, UNSPECIFIED CHRONICITY: ICD-10-CM

## 2022-11-21 LAB
INR PPP: 2 (ref 0.8–1.2)
PROTHROMBIN TIME: 21.1 SEC (ref 9–12.5)

## 2022-11-21 PROCEDURE — 36415 COLL VENOUS BLD VENIPUNCTURE: CPT | Mod: PO | Performed by: INTERNAL MEDICINE

## 2022-11-21 PROCEDURE — 85610 PROTHROMBIN TIME: CPT | Performed by: INTERNAL MEDICINE

## 2022-11-22 ENCOUNTER — ANTI-COAG VISIT (OUTPATIENT)
Dept: CARDIOLOGY | Facility: CLINIC | Age: 87
End: 2022-11-22
Payer: MEDICARE

## 2022-11-22 DIAGNOSIS — I82.421 DEEP VEIN THROMBOSIS (DVT) OF ILIAC VEIN OF RIGHT LOWER EXTREMITY, UNSPECIFIED CHRONICITY: Primary | ICD-10-CM

## 2022-11-22 DIAGNOSIS — I48.11 LONGSTANDING PERSISTENT ATRIAL FIBRILLATION: ICD-10-CM

## 2022-11-22 DIAGNOSIS — Z79.01 LONG TERM (CURRENT) USE OF ANTICOAGULANTS: ICD-10-CM

## 2022-11-22 PROCEDURE — 93793 PR ANTICOAGULANT MGMT FOR PT TAKING WARFARIN: ICD-10-PCS | Mod: S$GLB,,,

## 2022-11-22 PROCEDURE — 93793 ANTICOAG MGMT PT WARFARIN: CPT | Mod: S$GLB,,,

## 2022-12-05 ENCOUNTER — LAB VISIT (OUTPATIENT)
Dept: LAB | Facility: HOSPITAL | Age: 87
End: 2022-12-05
Attending: NURSE PRACTITIONER
Payer: MEDICARE

## 2022-12-05 DIAGNOSIS — I82.421 DEEP VEIN THROMBOSIS (DVT) OF ILIAC VEIN OF RIGHT LOWER EXTREMITY, UNSPECIFIED CHRONICITY: ICD-10-CM

## 2022-12-05 LAB
INR PPP: 2 (ref 0.8–1.2)
PROTHROMBIN TIME: 20.7 SEC (ref 9–12.5)

## 2022-12-05 PROCEDURE — 36415 COLL VENOUS BLD VENIPUNCTURE: CPT | Mod: PO | Performed by: INTERNAL MEDICINE

## 2022-12-05 PROCEDURE — 85610 PROTHROMBIN TIME: CPT | Performed by: INTERNAL MEDICINE

## 2022-12-06 ENCOUNTER — ANTI-COAG VISIT (OUTPATIENT)
Dept: CARDIOLOGY | Facility: CLINIC | Age: 87
End: 2022-12-06
Payer: MEDICARE

## 2022-12-06 DIAGNOSIS — I82.421 DEEP VEIN THROMBOSIS (DVT) OF ILIAC VEIN OF RIGHT LOWER EXTREMITY, UNSPECIFIED CHRONICITY: Primary | ICD-10-CM

## 2022-12-06 DIAGNOSIS — Z79.01 LONG TERM (CURRENT) USE OF ANTICOAGULANTS: ICD-10-CM

## 2022-12-06 PROCEDURE — 93793 PR ANTICOAGULANT MGMT FOR PT TAKING WARFARIN: ICD-10-PCS | Mod: S$GLB,,,

## 2022-12-06 PROCEDURE — 93793 ANTICOAG MGMT PT WARFARIN: CPT | Mod: S$GLB,,,

## 2023-01-17 ENCOUNTER — LAB VISIT (OUTPATIENT)
Dept: LAB | Facility: HOSPITAL | Age: 88
End: 2023-01-17
Attending: INTERNAL MEDICINE
Payer: MEDICARE

## 2023-01-17 DIAGNOSIS — I82.421 DEEP VEIN THROMBOSIS (DVT) OF ILIAC VEIN OF RIGHT LOWER EXTREMITY, UNSPECIFIED CHRONICITY: ICD-10-CM

## 2023-01-17 DIAGNOSIS — Z79.01 LONG TERM (CURRENT) USE OF ANTICOAGULANTS: ICD-10-CM

## 2023-01-17 LAB
INR PPP: 2.1 (ref 0.8–1.2)
PROTHROMBIN TIME: 21.4 SEC (ref 9–12.5)

## 2023-01-17 PROCEDURE — 36415 COLL VENOUS BLD VENIPUNCTURE: CPT | Mod: HCNC,PO | Performed by: INTERNAL MEDICINE

## 2023-01-17 PROCEDURE — 85610 PROTHROMBIN TIME: CPT | Mod: HCNC | Performed by: INTERNAL MEDICINE

## 2023-01-18 ENCOUNTER — ANTI-COAG VISIT (OUTPATIENT)
Dept: CARDIOLOGY | Facility: CLINIC | Age: 88
End: 2023-01-18
Payer: MEDICARE

## 2023-01-18 DIAGNOSIS — I82.421 DEEP VEIN THROMBOSIS (DVT) OF ILIAC VEIN OF RIGHT LOWER EXTREMITY, UNSPECIFIED CHRONICITY: Primary | ICD-10-CM

## 2023-01-18 DIAGNOSIS — Z79.01 LONG TERM (CURRENT) USE OF ANTICOAGULANTS: ICD-10-CM

## 2023-01-18 PROCEDURE — 93793 ANTICOAG MGMT PT WARFARIN: CPT | Mod: S$GLB,,,

## 2023-01-18 PROCEDURE — 93793 PR ANTICOAGULANT MGMT FOR PT TAKING WARFARIN: ICD-10-PCS | Mod: S$GLB,,,

## 2023-01-19 ENCOUNTER — DOCUMENT SCAN (OUTPATIENT)
Dept: HOME HEALTH SERVICES | Facility: HOSPITAL | Age: 88
End: 2023-01-19
Payer: MEDICARE

## 2023-01-19 ENCOUNTER — PATIENT MESSAGE (OUTPATIENT)
Dept: CARDIOLOGY | Facility: CLINIC | Age: 88
End: 2023-01-19
Payer: MEDICARE

## 2023-02-07 DIAGNOSIS — Z00.00 ENCOUNTER FOR MEDICARE ANNUAL WELLNESS EXAM: ICD-10-CM

## 2023-02-09 DIAGNOSIS — Z00.00 ENCOUNTER FOR MEDICARE ANNUAL WELLNESS EXAM: ICD-10-CM

## 2023-02-24 ENCOUNTER — LAB VISIT (OUTPATIENT)
Dept: LAB | Facility: HOSPITAL | Age: 88
End: 2023-02-24
Attending: INTERNAL MEDICINE
Payer: MEDICARE

## 2023-02-24 DIAGNOSIS — Z79.01 LONG TERM (CURRENT) USE OF ANTICOAGULANTS: ICD-10-CM

## 2023-02-24 DIAGNOSIS — I82.421 DEEP VEIN THROMBOSIS (DVT) OF ILIAC VEIN OF RIGHT LOWER EXTREMITY, UNSPECIFIED CHRONICITY: ICD-10-CM

## 2023-02-24 LAB
INR PPP: 2.5 (ref 0.8–1.2)
PROTHROMBIN TIME: 24.6 SEC (ref 9–12.5)

## 2023-02-24 PROCEDURE — 36415 COLL VENOUS BLD VENIPUNCTURE: CPT | Mod: HCNC,PO | Performed by: INTERNAL MEDICINE

## 2023-02-24 PROCEDURE — 85610 PROTHROMBIN TIME: CPT | Mod: HCNC | Performed by: INTERNAL MEDICINE

## 2023-02-27 ENCOUNTER — ANTI-COAG VISIT (OUTPATIENT)
Dept: CARDIOLOGY | Facility: CLINIC | Age: 88
End: 2023-02-27
Payer: MEDICARE

## 2023-02-27 DIAGNOSIS — Z79.01 LONG TERM (CURRENT) USE OF ANTICOAGULANTS: ICD-10-CM

## 2023-02-27 DIAGNOSIS — I82.421 DEEP VEIN THROMBOSIS (DVT) OF ILIAC VEIN OF RIGHT LOWER EXTREMITY, UNSPECIFIED CHRONICITY: Primary | ICD-10-CM

## 2023-02-27 PROCEDURE — 93793 PR ANTICOAGULANT MGMT FOR PT TAKING WARFARIN: ICD-10-PCS | Mod: S$GLB,,,

## 2023-02-27 PROCEDURE — 93793 ANTICOAG MGMT PT WARFARIN: CPT | Mod: S$GLB,,,

## 2023-03-06 ENCOUNTER — OFFICE VISIT (OUTPATIENT)
Dept: PODIATRY | Facility: CLINIC | Age: 88
End: 2023-03-06
Payer: MEDICARE

## 2023-03-06 VITALS — BODY MASS INDEX: 24.75 KG/M2 | WEIGHT: 110 LBS | HEIGHT: 56 IN

## 2023-03-06 DIAGNOSIS — B35.1 ONYCHOMYCOSIS DUE TO DERMATOPHYTE: ICD-10-CM

## 2023-03-06 DIAGNOSIS — I73.9 PERIPHERAL VASCULAR DISEASE, UNSPECIFIED: ICD-10-CM

## 2023-03-06 DIAGNOSIS — M20.12 VALGUS DEFORMITY OF BOTH GREAT TOES: ICD-10-CM

## 2023-03-06 DIAGNOSIS — Z79.01 LONG TERM CURRENT USE OF ANTICOAGULANT THERAPY: Primary | ICD-10-CM

## 2023-03-06 DIAGNOSIS — M20.11 VALGUS DEFORMITY OF BOTH GREAT TOES: ICD-10-CM

## 2023-03-06 PROCEDURE — 99999 PR PBB SHADOW E&M-EST. PATIENT-LVL III: CPT | Mod: PBBFAC,HCNC,, | Performed by: PODIATRIST

## 2023-03-06 PROCEDURE — 1157F PR ADVANCE CARE PLAN OR EQUIV PRESENT IN MEDICAL RECORD: ICD-10-PCS | Mod: HCNC,CPTII,S$GLB, | Performed by: PODIATRIST

## 2023-03-06 PROCEDURE — 1101F PT FALLS ASSESS-DOCD LE1/YR: CPT | Mod: HCNC,CPTII,S$GLB, | Performed by: PODIATRIST

## 2023-03-06 PROCEDURE — 3288F PR FALLS RISK ASSESSMENT DOCUMENTED: ICD-10-PCS | Mod: HCNC,CPTII,S$GLB, | Performed by: PODIATRIST

## 2023-03-06 PROCEDURE — 11721 DEBRIDE NAIL 6 OR MORE: CPT | Mod: Q9,HCNC,S$GLB, | Performed by: PODIATRIST

## 2023-03-06 PROCEDURE — 1159F MED LIST DOCD IN RCRD: CPT | Mod: HCNC,CPTII,S$GLB, | Performed by: PODIATRIST

## 2023-03-06 PROCEDURE — 1126F PR PAIN SEVERITY QUANTIFIED, NO PAIN PRESENT: ICD-10-PCS | Mod: HCNC,CPTII,S$GLB, | Performed by: PODIATRIST

## 2023-03-06 PROCEDURE — 99214 OFFICE O/P EST MOD 30 MIN: CPT | Mod: 25,HCNC,S$GLB, | Performed by: PODIATRIST

## 2023-03-06 PROCEDURE — 1126F AMNT PAIN NOTED NONE PRSNT: CPT | Mod: HCNC,CPTII,S$GLB, | Performed by: PODIATRIST

## 2023-03-06 PROCEDURE — 99999 PR PBB SHADOW E&M-EST. PATIENT-LVL III: ICD-10-PCS | Mod: PBBFAC,HCNC,, | Performed by: PODIATRIST

## 2023-03-06 PROCEDURE — 11721 PR DEBRIDEMENT OF NAILS, 6 OR MORE: ICD-10-PCS | Mod: Q9,HCNC,S$GLB, | Performed by: PODIATRIST

## 2023-03-06 PROCEDURE — 3288F FALL RISK ASSESSMENT DOCD: CPT | Mod: HCNC,CPTII,S$GLB, | Performed by: PODIATRIST

## 2023-03-06 PROCEDURE — 1159F PR MEDICATION LIST DOCUMENTED IN MEDICAL RECORD: ICD-10-PCS | Mod: HCNC,CPTII,S$GLB, | Performed by: PODIATRIST

## 2023-03-06 PROCEDURE — 1157F ADVNC CARE PLAN IN RCRD: CPT | Mod: HCNC,CPTII,S$GLB, | Performed by: PODIATRIST

## 2023-03-06 PROCEDURE — 99214 PR OFFICE/OUTPT VISIT, EST, LEVL IV, 30-39 MIN: ICD-10-PCS | Mod: 25,HCNC,S$GLB, | Performed by: PODIATRIST

## 2023-03-06 PROCEDURE — 1101F PR PT FALLS ASSESS DOC 0-1 FALLS W/OUT INJ PAST YR: ICD-10-PCS | Mod: HCNC,CPTII,S$GLB, | Performed by: PODIATRIST

## 2023-03-06 RX ORDER — CICLOPIROX 80 MG/ML
SOLUTION TOPICAL NIGHTLY
Qty: 6.6 ML | Refills: 11 | Status: SHIPPED | OUTPATIENT
Start: 2023-03-06

## 2023-03-06 NOTE — PROGRESS NOTES
Subjective:      Patient ID: Nany Crenshaw is a 89 y.o. female.    Chief Complaint: Nail Care      Nany is a 89 y.o. female who presents to the clinic for evaluation and treatment of high risk feet. Nany has a past medical history of Colon polyp, Coronary artery disease, Diverticulosis, Fracture of right distal radius, GERD (gastroesophageal reflux disease), Hypertension, Hypothyroidism (acquired), Osteoporosis, post-menopausal, and Scoliosis. The patient's chief complaint is long, thick toenails. Gradual onset, worsening over past several weeks, aggravated by increased weight bearing, shoe gear, pressure.  No previous medical treatment.  OTC pain med not helping. Denies trauma, surgery..    PCP: Ivette Mcdaniel MD    Date Last Seen by PCP:   Chief Complaint   Patient presents with    Nail Care        Current shoe gear:  Affected Foot: Casual shoes     Unaffected Foot: Casual shoes    Last encounter in this department: 9/27/2021    Hemoglobin A1C   Date Value Ref Range Status   01/20/2005 5.6 4.5 - 6.2 % Final       Review of Systems   Constitutional: Negative for chills, diaphoresis, fever, malaise/fatigue and night sweats.   Cardiovascular:  Positive for leg swelling. Negative for claudication, cyanosis and syncope.   Skin:  Positive for nail changes. Negative for color change, dry skin, rash, suspicious lesions and unusual hair distribution.   Musculoskeletal:  Negative for falls, joint pain, joint swelling, muscle cramps, muscle weakness and stiffness.   Gastrointestinal:  Negative for constipation, diarrhea, nausea and vomiting.   Neurological:  Positive for paresthesias and sensory change. Negative for brief paralysis, disturbances in coordination, focal weakness, numbness and tremors.           Objective:      Physical Exam  Constitutional:       General: She is not in acute distress.     Appearance: She is well-developed. She is not diaphoretic.   Cardiovascular:      Pulses:           Popliteal  pulses are 2+ on the right side and 2+ on the left side.        Dorsalis pedis pulses are 1+ on the right side and 1+ on the left side.        Posterior tibial pulses are 1+ on the right side and 1+ on the left side.      Comments: Capillary refill 3 seconds all toes/distal feet, all toes/both feet warm to touch.      Negative lymphadenopathy bilateral popliteal fossa and tarsal tunnel.      <2+ pitting lower extremity edema bilateral.    Musculoskeletal:      Right ankle: No swelling, deformity, ecchymosis or lacerations. Normal range of motion. Normal pulse.      Right Achilles Tendon: Normal. No defects. Jeff's test negative.      Comments: Reduced stride and toe off.. All ten toes without clubbing, cyanosis, or signs of ischemia.  No pain to palpation bilateral lower extremities.  Range of motion, stability, muscle strength, and muscle tone normal bilateral feet and legs.    Lymphadenopathy:      Lower Body: No right inguinal adenopathy. No left inguinal adenopathy.      Comments: Negative lymphadenopathy bilateral popliteal fossa and tarsal tunnel.    Negative lymphangitic streaking bilateral feet/ankles/legs.   Skin:     General: Skin is warm and dry.      Capillary Refill: Capillary refill takes 2 to 3 seconds.      Coloration: Skin is not pale.      Findings: No abrasion, bruising, burn, ecchymosis, erythema, laceration, lesion or rash.      Nails: There is no clubbing.      Comments:   Skin thin, shiny, atrophic, with decreased density and distribution of pedal hair bilateral, but without hyperpigmentation, boris discoloration,  ulcers, masses, nodules or cords palpated bilateral feet and legs.    Toenails 1st, 2nd, 3rd, 4th, 5th  bilateral are hypertrophic thickened 2-3 mm, dystrophic, discolored tanish brown with tan, gray crumbly subungual debris.  Tender to distal nail plate pressure, without periungual skin abnormality of each.       Neurological:      Mental Status: She is alert and oriented to  person, place, and time.      Sensory: No sensory deficit.      Motor: No tremor, atrophy or abnormal muscle tone.      Gait: Gait normal.      Deep Tendon Reflexes:      Reflex Scores:       Patellar reflexes are 2+ on the right side and 2+ on the left side.       Achilles reflexes are 2+ on the right side and 2+ on the left side.     Comments: Paresthesias,intermittently bilateral feet with no clearly identified trigger or source.    Negative tinel sign to percussion sural, superficial peroneal, deep peroneal, saphenous, and posterior tibial nerves right and left ankles and feet.     Psychiatric:         Behavior: Behavior is cooperative.             Assessment:       No diagnosis found.        Plan:       There are no diagnoses linked to this encounter.    I counseled the patient on her conditions, their implications and medical management.        With the patient's permission, I debrided all ten toenails with a sterile nipper and curette, removing all offending nail and debris.  Patient tolerated the procedure well and related significant relief.      Discussed conservative treatment with shoes of adequate dimensions, material, and style to alleviate symptoms and delay or prevent surgical intervention.    Continue penlac            No follow-ups on file.

## 2023-03-10 ENCOUNTER — OFFICE VISIT (OUTPATIENT)
Dept: CARDIOLOGY | Facility: CLINIC | Age: 88
End: 2023-03-10
Payer: MEDICARE

## 2023-03-10 VITALS
BODY MASS INDEX: 23.62 KG/M2 | HEART RATE: 64 BPM | SYSTOLIC BLOOD PRESSURE: 148 MMHG | WEIGHT: 105 LBS | HEIGHT: 56 IN | RESPIRATION RATE: 18 BRPM | OXYGEN SATURATION: 94 % | DIASTOLIC BLOOD PRESSURE: 68 MMHG

## 2023-03-10 DIAGNOSIS — I10 BENIGN HYPERTENSION: Primary | ICD-10-CM

## 2023-03-10 DIAGNOSIS — I25.10 CORONARY ARTERY DISEASE INVOLVING NATIVE CORONARY ARTERY OF NATIVE HEART WITHOUT ANGINA PECTORIS: ICD-10-CM

## 2023-03-10 DIAGNOSIS — I48.11 LONGSTANDING PERSISTENT ATRIAL FIBRILLATION: ICD-10-CM

## 2023-03-10 DIAGNOSIS — E78.00 PURE HYPERCHOLESTEROLEMIA: ICD-10-CM

## 2023-03-10 DIAGNOSIS — I82.421 DEEP VEIN THROMBOSIS (DVT) OF ILIAC VEIN OF RIGHT LOWER EXTREMITY, UNSPECIFIED CHRONICITY: ICD-10-CM

## 2023-03-10 PROCEDURE — 1159F PR MEDICATION LIST DOCUMENTED IN MEDICAL RECORD: ICD-10-PCS | Mod: HCNC,CPTII,S$GLB, | Performed by: INTERNAL MEDICINE

## 2023-03-10 PROCEDURE — 1157F ADVNC CARE PLAN IN RCRD: CPT | Mod: HCNC,CPTII,S$GLB, | Performed by: INTERNAL MEDICINE

## 2023-03-10 PROCEDURE — 93000 EKG 12-LEAD: ICD-10-PCS | Mod: HCNC,S$GLB,, | Performed by: INTERNAL MEDICINE

## 2023-03-10 PROCEDURE — 1126F PR PAIN SEVERITY QUANTIFIED, NO PAIN PRESENT: ICD-10-PCS | Mod: HCNC,CPTII,S$GLB, | Performed by: INTERNAL MEDICINE

## 2023-03-10 PROCEDURE — 99214 PR OFFICE/OUTPT VISIT, EST, LEVL IV, 30-39 MIN: ICD-10-PCS | Mod: HCNC,S$GLB,, | Performed by: INTERNAL MEDICINE

## 2023-03-10 PROCEDURE — 99999 PR PBB SHADOW E&M-EST. PATIENT-LVL III: ICD-10-PCS | Mod: PBBFAC,HCNC,, | Performed by: INTERNAL MEDICINE

## 2023-03-10 PROCEDURE — 1159F MED LIST DOCD IN RCRD: CPT | Mod: HCNC,CPTII,S$GLB, | Performed by: INTERNAL MEDICINE

## 2023-03-10 PROCEDURE — 3288F PR FALLS RISK ASSESSMENT DOCUMENTED: ICD-10-PCS | Mod: HCNC,CPTII,S$GLB, | Performed by: INTERNAL MEDICINE

## 2023-03-10 PROCEDURE — 99999 PR PBB SHADOW E&M-EST. PATIENT-LVL III: CPT | Mod: PBBFAC,HCNC,, | Performed by: INTERNAL MEDICINE

## 2023-03-10 PROCEDURE — 1157F PR ADVANCE CARE PLAN OR EQUIV PRESENT IN MEDICAL RECORD: ICD-10-PCS | Mod: HCNC,CPTII,S$GLB, | Performed by: INTERNAL MEDICINE

## 2023-03-10 PROCEDURE — 3288F FALL RISK ASSESSMENT DOCD: CPT | Mod: HCNC,CPTII,S$GLB, | Performed by: INTERNAL MEDICINE

## 2023-03-10 PROCEDURE — 1101F PR PT FALLS ASSESS DOC 0-1 FALLS W/OUT INJ PAST YR: ICD-10-PCS | Mod: HCNC,CPTII,S$GLB, | Performed by: INTERNAL MEDICINE

## 2023-03-10 PROCEDURE — 1101F PT FALLS ASSESS-DOCD LE1/YR: CPT | Mod: HCNC,CPTII,S$GLB, | Performed by: INTERNAL MEDICINE

## 2023-03-10 PROCEDURE — 1126F AMNT PAIN NOTED NONE PRSNT: CPT | Mod: HCNC,CPTII,S$GLB, | Performed by: INTERNAL MEDICINE

## 2023-03-10 PROCEDURE — 93000 ELECTROCARDIOGRAM COMPLETE: CPT | Mod: HCNC,S$GLB,, | Performed by: INTERNAL MEDICINE

## 2023-03-10 PROCEDURE — 99214 OFFICE O/P EST MOD 30 MIN: CPT | Mod: HCNC,S$GLB,, | Performed by: INTERNAL MEDICINE

## 2023-03-10 RX ORDER — FUROSEMIDE 20 MG/1
20 TABLET ORAL DAILY PRN
Qty: 30 TABLET | Refills: 11 | Status: SHIPPED | OUTPATIENT
Start: 2023-03-10 | End: 2024-03-09

## 2023-03-10 NOTE — PROGRESS NOTES
Subjective:    Patient ID:  Nany Crenshaw is a 89 y.o. female who presents for follow-up of No chief complaint on file.      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  She's had some inflammation and prominence of her varicosities in the right leg.  She's mainly worried about recurrence of clot of blood thinners.  She denies any worsening cardiopulmonary complaints.  She's not had any chest pain, shortness of breath or palpitations.  She denies any PND, orthopnea or lower edema.  She is not expressing dizziness, presyncope or syncope.     Here for follow-up of DVT and coronary artery disease.  She still has some prominence in varicosities in her legs.  She says at times it gets red around her right ankle but does not cause her pain.  She tolerated her blood thinner as had no problems.  She recently went to [facility] without any issues on the airplane.  Do a trip to [place] planned as well.  She denies any PND, orthopnea or lower extremity edema otherwise.  She's not expressing dizziness, presyncope or syncope.     -cont med tx  -cont to follow sx mainly  -Diet and exercise tolerated  -On Eliquis for OAC indefinitely for recurrent DVT     LE venous US 6/1/22  Thrombosis is seen within the left iliac, common femoral, femoral, popliteal, and peroneal veins.  No evidence of clot involving the right common femoral vein or left greater saphenous and anterior and posterior tibial veins.     lower extremity ultrasound:   11-17  Nonocclusive thrombus in the right femoral vein and right popliteal vein.     Echo 5/24/21  The left ventricle is normal in size with mild concentric hypertrophy and normal systolic function.  The estimated ejection fraction is 65%.  Normal right ventricular size with normal right ventricular systolic function.  Mild aortic regurgitation.  Mild tricuspid regurgitation.  The estimated PA  systolic pressure is 33 mmHg.     Event monitor 5/18/21  Negative event monitor with no clinical arrhythmias.  Symptoms corresponding with normal sinus rhythm.            Carotid ultrasound:  5-18  CONCLUSIONS   There is 20 - 39% right Internal Carotid stenosis.  There is 20 - 39% left Internal Carotid stenosis.     Lower extremity arterial ultrasound:  Impression:  1-  no hemodynamically significant plaque bilaterally     Lower extremity venous ultrasound:  TEST DESCRIPTION   Impression:    RIGHT:  Chronic occlusive DVT of the Femoral Vein.    LEFT:  No evidence of Left lower extremity DVT.       1/6/20 Daughter provided translation  Denies CP or SOB  Walks with a walker at home  EKG NSR LBBB - no change     Went to the ER 4/4/21  This is a 89-year-old  female presents to the emergency room with her grandson who is a 1st year resident at [employer] studying Internal Medicine.  The patient was taking off her slippers and she slipped and fell striking the left side of her thorax.  The patient complains of pain in the left lateral thorax and the left lateral thoracic back to midline spine is spared along its entire course.  There is no shortness of breath or precordial chest discomfort the patient has no abdominal complaints.  She is essentially otherwise well without other injuries or problems she has no extremity injuries.  There is no known head trauma.  The patient notes no abrasions contusions or tender areas on her scalp.  She has no neck pain.  She has no neurologic deficits.  There is no cough or difficulty breathing.        5/14/21 Reports several episodes of weakness with near syncope over the last few weeks  EKG NSR LBBB - no change. Denies CP or SOB   Echo and event monitor for near syncopal spells  Needs to go to the ER if symptoms worsen     6/21/21 Denies CP or SOB. Still with dizzy spells - denies LOC or falls   Event monitor negative for arrhythmias during dizzy spells  Echo with good EF  Continue to  observe - could decrease lisinopril or metoprolol dose if dizzy spells persist  Decrease eliquis 2.5 bid  Continue Rx for HTN, HLD, DVT, CAD      10/1/21 Denies CP, SOB, or dizziness. Mild ankle edema  EKG NSR LBBB  PRN lasix for LE edema    Continue Rx for HTN, HLD, DVT, CAD   OV 6 months     Went to the ER 6/2/22  HPI: This is a 89 y.o.female patient, with a PMHx of DVT, CAD and HTN , presenting to the ED for follow up from her ED visit yesterday regarding swelling of the left leg. Patient's daughter reports patient was here yesterday and diagnosed with DVT to her left leg. Daughter reports patient was told to come back this morning for her 2nd Lovenox injection and to be set up with home health. Daughter reports the patient's leg swelling has decreased and has gotten better since the administration of the first injection last night in the ED. Patient denies any other complications. Patient denies any history of blood clots in the lungs. Patient denies any shortness of breath, chest pain, vomiting, or any other associated symptoms. No known drug allergies.     6/22/22 Went back to eliquis after taking lovenox for 2 weeks. LLE still with mild erythema and swelling. Denies CP or SOB  EKG NSR LBBB   Recurrent DVT on eliquis  Stop eliquis  Start coumadin 5 mg qd  Refer to coumadin clinic  Bridge with lovenox 60 mg bid until INR  > 2  OV 1 month  Continue Rx for HTN, HLD, CAD      8/4/22 INR in range on coumadin. Having some chronic skin changes in LE bilaterally  Denies CP or SOB   Recommended hydrating lotion for chronic skin changes  Continue Rx for HTN, HLD, CAD, DVT  OV 6 months     3/10/23 Here for bilateral foot swelling. Denies CP or SOB  Would like to change coumadin back to eliquis  Occasional dizziness  EKG NSR LBBB    Review of Systems   Constitutional: Negative for decreased appetite.   HENT:  Negative for ear discharge.    Eyes:  Negative for blurred vision.   Respiratory:  Negative for hemoptysis.     Endocrine: Negative for polyphagia.   Hematologic/Lymphatic: Negative for adenopathy.   Skin:  Negative for color change.   Musculoskeletal:  Negative for joint swelling.   Genitourinary:  Negative for bladder incontinence.   Neurological:  Negative for brief paralysis.   Psychiatric/Behavioral:  Negative for hallucinations.    Allergic/Immunologic: Negative for hives.      Objective:    Physical Exam  Constitutional:       Appearance: She is well-developed.   HENT:      Head: Normocephalic and atraumatic.   Eyes:      Conjunctiva/sclera: Conjunctivae normal.      Pupils: Pupils are equal, round, and reactive to light.   Neck:      Thyroid: No thyromegaly.   Cardiovascular:      Rate and Rhythm: Normal rate and regular rhythm.      Heart sounds: No murmur heard.  Pulmonary:      Effort: Pulmonary effort is normal. No respiratory distress.      Breath sounds: Normal breath sounds.   Abdominal:      General: Bowel sounds are normal.      Palpations: Abdomen is soft.   Musculoskeletal:         General: Normal range of motion.      Cervical back: Normal range of motion and neck supple.      Comments: Prominent varicosities with some inflammation right greater than left   Skin:     General: Skin is warm and dry.   Neurological:      Mental Status: She is alert and oriented to person, place, and time.   Psychiatric:         Behavior: Behavior normal.         Assessment:       1. Benign hypertension    2. Coronary artery disease involving native coronary artery of native heart without angina pectoris    3. Longstanding persistent atrial fibrillation    4. Pure hypercholesterolemia    5. Deep vein thrombosis (DVT) of iliac vein of right lower extremity, unspecified chronicity         Plan:       Lasix prn for LE edema  Change coumadin to eliquis 2.5 bid  Continue Rx for HTN, HLD, CAD, DVT  OV 6 month

## 2023-05-10 ENCOUNTER — OFFICE VISIT (OUTPATIENT)
Dept: INTERNAL MEDICINE | Facility: CLINIC | Age: 88
End: 2023-05-10
Payer: MEDICARE

## 2023-05-10 ENCOUNTER — TELEPHONE (OUTPATIENT)
Dept: INTERNAL MEDICINE | Facility: CLINIC | Age: 88
End: 2023-05-10

## 2023-05-10 DIAGNOSIS — I10 BENIGN HYPERTENSION: ICD-10-CM

## 2023-05-10 DIAGNOSIS — E78.5 DYSLIPIDEMIA: Primary | ICD-10-CM

## 2023-05-10 DIAGNOSIS — I10 BENIGN HYPERTENSION: Primary | ICD-10-CM

## 2023-05-10 DIAGNOSIS — E03.9 HYPOTHYROIDISM (ACQUIRED): ICD-10-CM

## 2023-05-10 PROBLEM — I82.409 DVT (DEEP VENOUS THROMBOSIS): Status: ACTIVE | Noted: 2023-05-10

## 2023-05-10 PROCEDURE — 99499 UNLISTED E&M SERVICE: CPT | Mod: HCNC,95,, | Performed by: INTERNAL MEDICINE

## 2023-05-10 PROCEDURE — 99499 NO LOS: ICD-10-PCS | Mod: HCNC,95,, | Performed by: INTERNAL MEDICINE

## 2023-05-10 NOTE — PROGRESS NOTES
Answers submitted by the patient for this visit:  Review of Systems Questionnaire (Submitted on 5/10/2023)  activity change: No  unexpected weight change: No  neck pain: No  hearing loss: No  rhinorrhea: No  trouble swallowing: No  eye discharge: No  visual disturbance: No  chest tightness: No  wheezing: No  chest pain: No  palpitations: No  blood in stool: No  constipation: No  vomiting: No  diarrhea: No  polydipsia: No  polyuria: No  difficulty urinating: No  hematuria: No  menstrual problem: No  dysuria: No  joint swelling: No  arthralgias: No  headaches: No  weakness: No  confusion: No  dysphoric mood: No  Subjective:       Patient ID: Nany Crenshaw is a 89 y.o. female.    Chief Complaint: Hypertension    HPI  Review of Systems   Constitutional:  Negative for activity change and unexpected weight change.   HENT:  Negative for hearing loss, rhinorrhea and trouble swallowing.    Eyes:  Negative for discharge and visual disturbance.   Respiratory:  Negative for chest tightness and wheezing.    Cardiovascular:  Negative for chest pain and palpitations.   Gastrointestinal:  Negative for blood in stool, constipation, diarrhea and vomiting.   Endocrine: Negative for polydipsia and polyuria.   Genitourinary:  Negative for difficulty urinating, dysuria, hematuria and menstrual problem.   Musculoskeletal:  Negative for arthralgias, joint swelling and neck pain.   Neurological:  Negative for weakness and headaches.   Psychiatric/Behavioral:  Negative for confusion and dysphoric mood.      Objective:    Pt alert comfortable   Physical Exam    Assessment:       1. Benign hypertension    2. Hypothyroidism (acquired)        Plan:       Nany was seen today for hypertension.    Diagnoses and all orders for this visit:    Benign hypertension  Will return for her appt with labs prior as requested    Hypothyroidism (acquired)  Continue current regimine     Dvt hx of on medication    Follow up [place] in next few weeks with labs prior  requested   No charge visit

## 2023-05-10 NOTE — TELEPHONE ENCOUNTER
Please keith pt  Follow up with me in the next few weeks  With labs prior [facility] labs  As requested  On a Wednesday pm if needed

## 2023-05-11 NOTE — TELEPHONE ENCOUNTER
Pt will  come in on wed 6/7 at 2:00 pt appt has been place in for override scheduling pt labs has been scheduled prior to appt

## 2023-06-05 ENCOUNTER — LAB VISIT (OUTPATIENT)
Dept: LAB | Facility: HOSPITAL | Age: 88
End: 2023-06-05
Attending: INTERNAL MEDICINE
Payer: MEDICARE

## 2023-06-05 ENCOUNTER — ANTI-COAG VISIT (OUTPATIENT)
Dept: CARDIOLOGY | Facility: CLINIC | Age: 88
End: 2023-06-05
Payer: MEDICARE

## 2023-06-05 DIAGNOSIS — I48.11 LONGSTANDING PERSISTENT ATRIAL FIBRILLATION: ICD-10-CM

## 2023-06-05 DIAGNOSIS — I82.421 DEEP VEIN THROMBOSIS (DVT) OF ILIAC VEIN OF RIGHT LOWER EXTREMITY, UNSPECIFIED CHRONICITY: ICD-10-CM

## 2023-06-05 DIAGNOSIS — I10 BENIGN HYPERTENSION: ICD-10-CM

## 2023-06-05 DIAGNOSIS — Z79.01 LONG TERM (CURRENT) USE OF ANTICOAGULANTS: ICD-10-CM

## 2023-06-05 DIAGNOSIS — E78.5 DYSLIPIDEMIA: ICD-10-CM

## 2023-06-05 DIAGNOSIS — I82.421 DEEP VEIN THROMBOSIS (DVT) OF ILIAC VEIN OF RIGHT LOWER EXTREMITY, UNSPECIFIED CHRONICITY: Primary | ICD-10-CM

## 2023-06-05 LAB
ALBUMIN SERPL BCP-MCNC: 3.6 G/DL (ref 3.5–5.2)
ALP SERPL-CCNC: 70 U/L (ref 55–135)
ALT SERPL W/O P-5'-P-CCNC: 20 U/L (ref 10–44)
ANION GAP SERPL CALC-SCNC: 9 MMOL/L (ref 8–16)
AST SERPL-CCNC: 25 U/L (ref 10–40)
BASOPHILS # BLD AUTO: 0.04 K/UL (ref 0–0.2)
BASOPHILS NFR BLD: 0.6 % (ref 0–1.9)
BILIRUB SERPL-MCNC: 1.5 MG/DL (ref 0.1–1)
BUN SERPL-MCNC: 13 MG/DL (ref 10–30)
CALCIUM SERPL-MCNC: 9.4 MG/DL (ref 8.7–10.5)
CHLORIDE SERPL-SCNC: 104 MMOL/L (ref 95–110)
CHOLEST SERPL-MCNC: 218 MG/DL (ref 120–199)
CHOLEST/HDLC SERPL: 2.3 {RATIO} (ref 2–5)
CO2 SERPL-SCNC: 29 MMOL/L (ref 23–29)
CREAT SERPL-MCNC: 0.8 MG/DL (ref 0.5–1.4)
DIFFERENTIAL METHOD: ABNORMAL
EOSINOPHIL # BLD AUTO: 0 K/UL (ref 0–0.5)
EOSINOPHIL NFR BLD: 0.6 % (ref 0–8)
ERYTHROCYTE [DISTWIDTH] IN BLOOD BY AUTOMATED COUNT: 13.9 % (ref 11.5–14.5)
EST. GFR  (NO RACE VARIABLE): >60 ML/MIN/1.73 M^2
GLUCOSE SERPL-MCNC: 106 MG/DL (ref 70–110)
HCT VFR BLD AUTO: 38.7 % (ref 37–48.5)
HDLC SERPL-MCNC: 96 MG/DL (ref 40–75)
HDLC SERPL: 44 % (ref 20–50)
HGB BLD-MCNC: 12.6 G/DL (ref 12–16)
IMM GRANULOCYTES # BLD AUTO: 0.03 K/UL (ref 0–0.04)
IMM GRANULOCYTES NFR BLD AUTO: 0.4 % (ref 0–0.5)
INR PPP: 1 (ref 0.8–1.2)
LDLC SERPL CALC-MCNC: 105.8 MG/DL (ref 63–159)
LYMPHOCYTES # BLD AUTO: 0.9 K/UL (ref 1–4.8)
LYMPHOCYTES NFR BLD: 13 % (ref 18–48)
MCH RBC QN AUTO: 29.2 PG (ref 27–31)
MCHC RBC AUTO-ENTMCNC: 32.6 G/DL (ref 32–36)
MCV RBC AUTO: 90 FL (ref 82–98)
MONOCYTES # BLD AUTO: 0.4 K/UL (ref 0.3–1)
MONOCYTES NFR BLD: 5.3 % (ref 4–15)
NEUTROPHILS # BLD AUTO: 5.4 K/UL (ref 1.8–7.7)
NEUTROPHILS NFR BLD: 80.1 % (ref 38–73)
NONHDLC SERPL-MCNC: 122 MG/DL
NRBC BLD-RTO: 0 /100 WBC
PLATELET # BLD AUTO: 195 K/UL (ref 150–450)
PMV BLD AUTO: 9.5 FL (ref 9.2–12.9)
POTASSIUM SERPL-SCNC: 4 MMOL/L (ref 3.5–5.1)
PROT SERPL-MCNC: 6.4 G/DL (ref 6–8.4)
PROTHROMBIN TIME: 10.3 SEC (ref 9–12.5)
RBC # BLD AUTO: 4.31 M/UL (ref 4–5.4)
SODIUM SERPL-SCNC: 142 MMOL/L (ref 136–145)
T4 FREE SERPL-MCNC: 0.76 NG/DL (ref 0.71–1.51)
TRIGL SERPL-MCNC: 81 MG/DL (ref 30–150)
TSH SERPL DL<=0.005 MIU/L-ACNC: 11.35 UIU/ML (ref 0.4–4)
WBC # BLD AUTO: 6.76 K/UL (ref 3.9–12.7)

## 2023-06-05 PROCEDURE — 80053 COMPREHEN METABOLIC PANEL: CPT | Performed by: INTERNAL MEDICINE

## 2023-06-05 PROCEDURE — 85025 COMPLETE CBC W/AUTO DIFF WBC: CPT | Performed by: INTERNAL MEDICINE

## 2023-06-05 PROCEDURE — 36415 COLL VENOUS BLD VENIPUNCTURE: CPT | Mod: PO | Performed by: INTERNAL MEDICINE

## 2023-06-05 PROCEDURE — 84443 ASSAY THYROID STIM HORMONE: CPT | Performed by: INTERNAL MEDICINE

## 2023-06-05 PROCEDURE — 85610 PROTHROMBIN TIME: CPT | Performed by: INTERNAL MEDICINE

## 2023-06-05 PROCEDURE — 80061 LIPID PANEL: CPT | Performed by: INTERNAL MEDICINE

## 2023-06-05 PROCEDURE — 84439 ASSAY OF FREE THYROXINE: CPT | Performed by: INTERNAL MEDICINE

## 2023-06-05 NOTE — PROGRESS NOTES
Patient is no longer taking Coumadin, has been taking Eliquis for about 3 months now per patients daughter.

## 2023-06-07 ENCOUNTER — OFFICE VISIT (OUTPATIENT)
Dept: INTERNAL MEDICINE | Facility: CLINIC | Age: 88
End: 2023-06-07
Payer: MEDICARE

## 2023-06-07 VITALS
OXYGEN SATURATION: 96 % | WEIGHT: 111.31 LBS | SYSTOLIC BLOOD PRESSURE: 110 MMHG | HEART RATE: 68 BPM | HEIGHT: 56 IN | BODY MASS INDEX: 25.04 KG/M2 | DIASTOLIC BLOOD PRESSURE: 62 MMHG

## 2023-06-07 DIAGNOSIS — E78.5 HYPERLIPIDEMIA, UNSPECIFIED HYPERLIPIDEMIA TYPE: ICD-10-CM

## 2023-06-07 DIAGNOSIS — E03.9 HYPOTHYROIDISM (ACQUIRED): ICD-10-CM

## 2023-06-07 DIAGNOSIS — I10 PRIMARY HYPERTENSION: ICD-10-CM

## 2023-06-07 DIAGNOSIS — I82.409 DEEP VEIN THROMBOSIS (DVT) OF LOWER EXTREMITY, UNSPECIFIED CHRONICITY, UNSPECIFIED LATERALITY, UNSPECIFIED VEIN: ICD-10-CM

## 2023-06-07 DIAGNOSIS — Z00.00 ANNUAL PHYSICAL EXAM: Primary | ICD-10-CM

## 2023-06-07 PROCEDURE — 99397 PR PREVENTIVE VISIT,EST,65 & OVER: ICD-10-PCS | Mod: S$GLB,,, | Performed by: INTERNAL MEDICINE

## 2023-06-07 PROCEDURE — 1157F ADVNC CARE PLAN IN RCRD: CPT | Mod: CPTII,S$GLB,, | Performed by: INTERNAL MEDICINE

## 2023-06-07 PROCEDURE — 1160F RVW MEDS BY RX/DR IN RCRD: CPT | Mod: CPTII,S$GLB,, | Performed by: INTERNAL MEDICINE

## 2023-06-07 PROCEDURE — 1159F PR MEDICATION LIST DOCUMENTED IN MEDICAL RECORD: ICD-10-PCS | Mod: CPTII,S$GLB,, | Performed by: INTERNAL MEDICINE

## 2023-06-07 PROCEDURE — 1126F PR PAIN SEVERITY QUANTIFIED, NO PAIN PRESENT: ICD-10-PCS | Mod: CPTII,S$GLB,, | Performed by: INTERNAL MEDICINE

## 2023-06-07 PROCEDURE — 99999 PR PBB SHADOW E&M-EST. PATIENT-LVL III: CPT | Mod: PBBFAC,,, | Performed by: INTERNAL MEDICINE

## 2023-06-07 PROCEDURE — 3288F FALL RISK ASSESSMENT DOCD: CPT | Mod: CPTII,S$GLB,, | Performed by: INTERNAL MEDICINE

## 2023-06-07 PROCEDURE — 1126F AMNT PAIN NOTED NONE PRSNT: CPT | Mod: CPTII,S$GLB,, | Performed by: INTERNAL MEDICINE

## 2023-06-07 PROCEDURE — 99397 PER PM REEVAL EST PAT 65+ YR: CPT | Mod: S$GLB,,, | Performed by: INTERNAL MEDICINE

## 2023-06-07 PROCEDURE — 1160F PR REVIEW ALL MEDS BY PRESCRIBER/CLIN PHARMACIST DOCUMENTED: ICD-10-PCS | Mod: CPTII,S$GLB,, | Performed by: INTERNAL MEDICINE

## 2023-06-07 PROCEDURE — 1157F PR ADVANCE CARE PLAN OR EQUIV PRESENT IN MEDICAL RECORD: ICD-10-PCS | Mod: CPTII,S$GLB,, | Performed by: INTERNAL MEDICINE

## 2023-06-07 PROCEDURE — 3288F PR FALLS RISK ASSESSMENT DOCUMENTED: ICD-10-PCS | Mod: CPTII,S$GLB,, | Performed by: INTERNAL MEDICINE

## 2023-06-07 PROCEDURE — 1101F PT FALLS ASSESS-DOCD LE1/YR: CPT | Mod: CPTII,S$GLB,, | Performed by: INTERNAL MEDICINE

## 2023-06-07 PROCEDURE — 1101F PR PT FALLS ASSESS DOC 0-1 FALLS W/OUT INJ PAST YR: ICD-10-PCS | Mod: CPTII,S$GLB,, | Performed by: INTERNAL MEDICINE

## 2023-06-07 PROCEDURE — 1159F MED LIST DOCD IN RCRD: CPT | Mod: CPTII,S$GLB,, | Performed by: INTERNAL MEDICINE

## 2023-06-07 PROCEDURE — 99999 PR PBB SHADOW E&M-EST. PATIENT-LVL III: ICD-10-PCS | Mod: PBBFAC,,, | Performed by: INTERNAL MEDICINE

## 2023-06-07 NOTE — PROGRESS NOTES
"Subjective:       Patient ID: Nany Crenshaw is a 89 y.o. female.    Chief Complaint: Annual Exam  This is a 89-year-old who presents today for physical she is brought in by her daughter and reports that she has been doing well she continues to live at home and had a granddaughter that stays with her in the evenings she takes her medicines regularly by herself and is good at being consistent with that she may have been taking an extra metoprolol when reviewing her medications and she will take her regular dose she has been taking her thyroid medicine consistently and by itself her TSH is a bit up and we discussed that she would like to maintain her current regimen for now and adjust upward if needed we will recheck those in August.  She and her daughter planning on going to [place] for trip over 4th of July.  Patient remains on Eliquis for her history of DVT and continues to have some ankle swelling at times.  She is had some arthritis in her left shoulder primarily and difficulty with pain with range of motion no recent falls     HPI  Review of Systems   Constitutional:         Eating well      Cardiovascular:  Positive for leg swelling.   Musculoskeletal:  Positive for arthralgias.        Left shoulder pain at times      Objective:    Blood pressure 110/62, pulse 68, height 4' 8" (1.422 m), weight 50.5 kg (111 lb 5.3 oz), SpO2 96 %.   Physical Exam  Constitutional:       General: She is not in acute distress.  HENT:      Head: Normocephalic.      Mouth/Throat:      Pharynx: Oropharynx is clear.   Eyes:      General: No scleral icterus.  Cardiovascular:      Rate and Rhythm: Normal rate and regular rhythm.      Heart sounds: Normal heart sounds. No murmur heard.    No friction rub. No gallop.      Comments: Rash under breast   Some discomfort no masses   Pulmonary:      Effort: Pulmonary effort is normal. No respiratory distress.      Breath sounds: Normal breath sounds.   Abdominal:      General: Bowel sounds are " normal.      Palpations: Abdomen is soft. There is no mass.      Tenderness: There is no abdominal tenderness.   Musculoskeletal:      Cervical back: Neck supple.      Comments: Left shoulder pain decreased rom    Skin:     Findings: No erythema.   Neurological:      Mental Status: She is alert.   Psychiatric:         Mood and Affect: Mood normal.       Assessment:       1. Annual physical exam    2. Primary hypertension    3. Hypothyroidism (acquired)    4. Deep vein thrombosis (DVT) of lower extremity, unspecified chronicity, unspecified laterality, unspecified vein    5. Hyperlipidemia, unspecified hyperlipidemia type        Plan:       Nany was seen today for annual exam.    Diagnoses and all orders for this visit:    Annual physical exam    Primary hypertension  Blood pressure acceptable continue current regimen  Her medications  Discucssed metoprolol twice daily   Lisinopril 1-1/2 tablets daily she will continue    Hypothyroidism (acquired)  Euthyroid continue current regimen for now we discussed adjustment upward but she would like to see how she does with taking the medicine consistently and by itself a little bit longer since her labs have always been good with the current dose will adjust upward if needed recheck labs in August  -     TSH; Future  -     T4, Free; Future    Deep vein thrombosis (DVT) of lower extremity, unspecified chronicity, unspecified laterality, unspecified vein  History of she follows with cardiology remains on low-dose Eliquis we discussed supportive diabetic socks may help with her swelling    Hyperlipidemia, unspecified hyperlipidemia type  Continue current regimen    For her shoulder pain we discussed conservative measures consider ortho or physical therapy she declines but they will call if she would like to do so in the future

## 2023-07-02 NOTE — TELEPHONE ENCOUNTER
No care due was identified.  [facility] Embedded Care Due Messages. Reference number: [id number].   7/02/2023 11:31:39 AM CDT

## 2023-07-03 RX ORDER — SIMVASTATIN 40 MG/1
TABLET, FILM COATED ORAL
Qty: 90 TABLET | Refills: 3 | Status: SHIPPED | OUTPATIENT
Start: 2023-07-03

## 2023-07-03 NOTE — TELEPHONE ENCOUNTER
Refill Decision Note      Refill Decision Note   Nany Crenshaw  is requesting a refill authorization.  Brief Assessment and Rationale for Refill:  Approve     Medication Therapy Plan:         Comments:     Note composed:9:24 AM 07/03/2023             Appointments     Last Visit   6/7/2023 Ivette Mcdaniel MD   Next Visit   Visit date not found Ivette Mcdaniel MD

## 2023-07-14 ENCOUNTER — TELEPHONE (OUTPATIENT)
Dept: FAMILY MEDICINE | Facility: CLINIC | Age: 88
End: 2023-07-14
Payer: MEDICARE

## 2023-08-11 ENCOUNTER — LAB VISIT (OUTPATIENT)
Dept: LAB | Facility: HOSPITAL | Age: 88
End: 2023-08-11
Attending: INTERNAL MEDICINE
Payer: MEDICARE

## 2023-08-11 DIAGNOSIS — E03.9 HYPOTHYROIDISM (ACQUIRED): ICD-10-CM

## 2023-08-11 LAB
T4 FREE SERPL-MCNC: 0.95 NG/DL (ref 0.71–1.51)
TSH SERPL DL<=0.005 MIU/L-ACNC: 2.23 UIU/ML (ref 0.4–4)

## 2023-08-11 PROCEDURE — 36415 COLL VENOUS BLD VENIPUNCTURE: CPT | Mod: HCNC,PO | Performed by: INTERNAL MEDICINE

## 2023-08-11 PROCEDURE — 84443 ASSAY THYROID STIM HORMONE: CPT | Mod: HCNC | Performed by: INTERNAL MEDICINE

## 2023-08-11 PROCEDURE — 84439 ASSAY OF FREE THYROXINE: CPT | Mod: HCNC | Performed by: INTERNAL MEDICINE

## 2023-08-14 ENCOUNTER — TELEPHONE (OUTPATIENT)
Dept: INTERNAL MEDICINE | Facility: CLINIC | Age: 88
End: 2023-08-14
Payer: MEDICARE

## 2023-08-18 ENCOUNTER — TELEPHONE (OUTPATIENT)
Dept: ADMINISTRATIVE | Facility: CLINIC | Age: 88
End: 2023-08-18
Payer: MEDICARE

## 2023-08-18 NOTE — PROGRESS NOTES
Via  Kristen: Called pt, no answer; left message informing pt I was calling to remind pt of her in office EAWV on 8/21/23 and to return my call if she had any questions; left my name and number.

## 2023-10-13 ENCOUNTER — OFFICE VISIT (OUTPATIENT)
Dept: PODIATRY | Facility: CLINIC | Age: 88
End: 2023-10-13
Payer: MEDICARE

## 2023-10-13 VITALS — HEIGHT: 56 IN | BODY MASS INDEX: 25.04 KG/M2 | WEIGHT: 111.31 LBS

## 2023-10-13 DIAGNOSIS — B35.1 ONYCHOMYCOSIS DUE TO DERMATOPHYTE: ICD-10-CM

## 2023-10-13 DIAGNOSIS — Z79.01 LONG TERM CURRENT USE OF ANTICOAGULANT THERAPY: Primary | ICD-10-CM

## 2023-10-13 PROCEDURE — 99499 UNLISTED E&M SERVICE: CPT | Mod: HCNC,S$GLB,, | Performed by: PODIATRIST

## 2023-10-13 PROCEDURE — 99999 PR PBB SHADOW E&M-EST. PATIENT-LVL III: CPT | Mod: PBBFAC,HCNC,, | Performed by: PODIATRIST

## 2023-10-13 PROCEDURE — 11721 DEBRIDE NAIL 6 OR MORE: CPT | Mod: Q9,HCNC,S$GLB, | Performed by: PODIATRIST

## 2023-10-13 PROCEDURE — 99999 PR PBB SHADOW E&M-EST. PATIENT-LVL III: ICD-10-PCS | Mod: PBBFAC,HCNC,, | Performed by: PODIATRIST

## 2023-10-13 PROCEDURE — 99499 NO LOS: ICD-10-PCS | Mod: HCNC,S$GLB,, | Performed by: PODIATRIST

## 2023-10-13 PROCEDURE — 11721 PR DEBRIDEMENT OF NAILS, 6 OR MORE: ICD-10-PCS | Mod: Q9,HCNC,S$GLB, | Performed by: PODIATRIST

## 2023-10-13 NOTE — PROGRESS NOTES
Subjective:      Patient ID: Nany Crenshaw is a 89 y.o. female.    Chief Complaint: Nail Care      Nany is a 89 y.o. female who presents to the clinic for evaluation and treatment of high risk feet. Nany has a past medical history of Colon polyp, Coronary artery disease, Diverticulosis, Fracture of right distal radius, GERD (gastroesophageal reflux disease), Hypertension, Hypothyroidism (acquired), Osteoporosis, post-menopausal, and Scoliosis. The patient's chief complaint is long, thick toenails. Gradual onset, worsening over past several weeks, aggravated by increased weight bearing, shoe gear, pressure.  Periodic debridement helps symptoms. Denies trauma, surgery..    PCP: Ivette Mcdaniel MD    Date Last Seen by PCP:   Chief Complaint   Patient presents with    Nail Care        Current shoe gear:  Affected Foot: Casual shoes     Unaffected Foot: Casual shoes    Last encounter in this department: 9/27/2021    Hemoglobin A1C   Date Value Ref Range Status   01/20/2005 5.6 4.5 - 6.2 % Final       Review of Systems   Constitutional: Negative for chills, diaphoresis, fever, malaise/fatigue and night sweats.   Cardiovascular:  Positive for leg swelling. Negative for claudication, cyanosis and syncope.   Skin:  Positive for nail changes. Negative for color change, dry skin, rash, suspicious lesions and unusual hair distribution.   Musculoskeletal:  Negative for falls, joint pain, joint swelling, muscle cramps, muscle weakness and stiffness.   Gastrointestinal:  Negative for constipation, diarrhea, nausea and vomiting.   Neurological:  Positive for paresthesias and sensory change. Negative for brief paralysis, disturbances in coordination, focal weakness, numbness and tremors.           Objective:      Physical Exam  Constitutional:       General: She is not in acute distress.     Appearance: She is well-developed. She is not diaphoretic.   Cardiovascular:      Pulses:           Popliteal pulses are 2+ on  the right side and 2+ on the left side.        Dorsalis pedis pulses are 1+ on the right side and 1+ on the left side.        Posterior tibial pulses are 1+ on the right side and 1+ on the left side.      Comments: Capillary refill 3 seconds all toes/distal feet, all toes/both feet warm to touch.      Negative lymphadenopathy bilateral popliteal fossa and tarsal tunnel.      <2+ pitting lower extremity edema bilateral.    Musculoskeletal:      Right ankle: No swelling, deformity, ecchymosis or lacerations. Normal range of motion. Normal pulse.      Right Achilles Tendon: Normal. No defects. Jeff's test negative.      Comments: Reduced stride and toe off.. All ten toes without clubbing, cyanosis, or signs of ischemia.  No pain to palpation bilateral lower extremities.  Range of motion, stability, muscle strength, and muscle tone normal bilateral feet and legs.    Lymphadenopathy:      Lower Body: No right inguinal adenopathy. No left inguinal adenopathy.      Comments: Negative lymphadenopathy bilateral popliteal fossa and tarsal tunnel.    Negative lymphangitic streaking bilateral feet/ankles/legs.   Skin:     General: Skin is warm and dry.      Capillary Refill: Capillary refill takes 2 to 3 seconds.      Coloration: Skin is not pale.      Findings: No abrasion, bruising, burn, ecchymosis, erythema, laceration, lesion or rash.      Nails: There is no clubbing.      Comments:   Skin thin, shiny, atrophic, with decreased density and distribution of pedal hair bilateral, but without hyperpigmentation, boris discoloration,  ulcers, masses, nodules or cords palpated bilateral feet and legs.    Toenails 1st, 2nd, 3rd, 4th, 5th  bilateral are hypertrophic thickened 2-3 mm, dystrophic, discolored tanish brown with tan, gray crumbly subungual debris.  Tender to distal nail plate pressure, without periungual skin abnormality of each.       Neurological:      Mental Status: She is alert and oriented to person, place, and  time.      Sensory: No sensory deficit.      Motor: No tremor, atrophy or abnormal muscle tone.      Gait: Gait normal.      Deep Tendon Reflexes:      Reflex Scores:       Patellar reflexes are 2+ on the right side and 2+ on the left side.       Achilles reflexes are 2+ on the right side and 2+ on the left side.     Comments: Paresthesias,intermittently bilateral feet with no clearly identified trigger or source.    Negative tinel sign to percussion sural, superficial peroneal, deep peroneal, saphenous, and posterior tibial nerves right and left ankles and feet.     Psychiatric:         Behavior: Behavior is cooperative.               Assessment:       Encounter Diagnoses   Name Primary?    Long term current use of anticoagulant therapy Yes    Onychomycosis due to dermatophyte            Plan:       Nany was seen today for nail care.    Diagnoses and all orders for this visit:    Long term current use of anticoagulant therapy    Onychomycosis due to dermatophyte        I counseled the patient on her conditions, their implications and medical management.        With the patient's permission, I debrided all ten toenails with a sterile nipper and curette, removing all offending nail and debris.  Patient tolerated the procedure well and related significant relief.    Discussed conservative treatment with shoes of adequate dimensions, material, and style to alleviate symptoms and delay or prevent surgical intervention.    Continue penlac            No follow-ups on file.

## 2023-11-14 RX ORDER — LISINOPRIL 10 MG/1
TABLET ORAL
Qty: 135 TABLET | Refills: 1 | Status: SHIPPED | OUTPATIENT
Start: 2023-11-14

## 2023-11-14 NOTE — TELEPHONE ENCOUNTER
No care due was identified.  [facility] Embedded Care Due Messages. Reference number: [id number].   11/14/2023 7:52:30 AM CST

## 2023-11-14 NOTE — TELEPHONE ENCOUNTER
Refill Decision Note   Nany Crenshaw  is requesting a refill authorization.  Brief Assessment and Rationale for Refill:  Approve     Medication Therapy Plan:       Medication Reconciliation Completed: No   Comments:     No Care Gaps recommended.     Note composed:10:11 AM 11/14/2023

## 2023-11-17 ENCOUNTER — OFFICE VISIT (OUTPATIENT)
Dept: CARDIOLOGY | Facility: CLINIC | Age: 88
End: 2023-11-17
Payer: MEDICARE

## 2023-11-17 VITALS
HEART RATE: 60 BPM | BODY MASS INDEX: 24.01 KG/M2 | HEIGHT: 57 IN | WEIGHT: 111.31 LBS | DIASTOLIC BLOOD PRESSURE: 70 MMHG | OXYGEN SATURATION: 96 % | SYSTOLIC BLOOD PRESSURE: 108 MMHG

## 2023-11-17 DIAGNOSIS — I25.10 CORONARY ARTERY DISEASE INVOLVING NATIVE CORONARY ARTERY OF NATIVE HEART WITHOUT ANGINA PECTORIS: Primary | ICD-10-CM

## 2023-11-17 DIAGNOSIS — I10 BENIGN HYPERTENSION: ICD-10-CM

## 2023-11-17 DIAGNOSIS — I48.11 LONGSTANDING PERSISTENT ATRIAL FIBRILLATION: ICD-10-CM

## 2023-11-17 DIAGNOSIS — E78.00 PURE HYPERCHOLESTEROLEMIA: ICD-10-CM

## 2023-11-17 PROCEDURE — 99214 PR OFFICE/OUTPT VISIT, EST, LEVL IV, 30-39 MIN: ICD-10-PCS | Mod: HCNC,S$GLB,, | Performed by: INTERNAL MEDICINE

## 2023-11-17 PROCEDURE — 1101F PR PT FALLS ASSESS DOC 0-1 FALLS W/OUT INJ PAST YR: ICD-10-PCS | Mod: HCNC,CPTII,S$GLB, | Performed by: INTERNAL MEDICINE

## 2023-11-17 PROCEDURE — 1126F AMNT PAIN NOTED NONE PRSNT: CPT | Mod: HCNC,CPTII,S$GLB, | Performed by: INTERNAL MEDICINE

## 2023-11-17 PROCEDURE — 3288F FALL RISK ASSESSMENT DOCD: CPT | Mod: HCNC,CPTII,S$GLB, | Performed by: INTERNAL MEDICINE

## 2023-11-17 PROCEDURE — 3288F PR FALLS RISK ASSESSMENT DOCUMENTED: ICD-10-PCS | Mod: HCNC,CPTII,S$GLB, | Performed by: INTERNAL MEDICINE

## 2023-11-17 PROCEDURE — 1159F MED LIST DOCD IN RCRD: CPT | Mod: HCNC,CPTII,S$GLB, | Performed by: INTERNAL MEDICINE

## 2023-11-17 PROCEDURE — 99214 OFFICE O/P EST MOD 30 MIN: CPT | Mod: HCNC,S$GLB,, | Performed by: INTERNAL MEDICINE

## 2023-11-17 PROCEDURE — 1126F PR PAIN SEVERITY QUANTIFIED, NO PAIN PRESENT: ICD-10-PCS | Mod: HCNC,CPTII,S$GLB, | Performed by: INTERNAL MEDICINE

## 2023-11-17 PROCEDURE — 99999 PR PBB SHADOW E&M-EST. PATIENT-LVL III: ICD-10-PCS | Mod: PBBFAC,HCNC,, | Performed by: INTERNAL MEDICINE

## 2023-11-17 PROCEDURE — 1101F PT FALLS ASSESS-DOCD LE1/YR: CPT | Mod: HCNC,CPTII,S$GLB, | Performed by: INTERNAL MEDICINE

## 2023-11-17 PROCEDURE — 1157F PR ADVANCE CARE PLAN OR EQUIV PRESENT IN MEDICAL RECORD: ICD-10-PCS | Mod: HCNC,CPTII,S$GLB, | Performed by: INTERNAL MEDICINE

## 2023-11-17 PROCEDURE — 1159F PR MEDICATION LIST DOCUMENTED IN MEDICAL RECORD: ICD-10-PCS | Mod: HCNC,CPTII,S$GLB, | Performed by: INTERNAL MEDICINE

## 2023-11-17 PROCEDURE — 99999 PR PBB SHADOW E&M-EST. PATIENT-LVL III: CPT | Mod: PBBFAC,HCNC,, | Performed by: INTERNAL MEDICINE

## 2023-11-17 PROCEDURE — 1157F ADVNC CARE PLAN IN RCRD: CPT | Mod: HCNC,CPTII,S$GLB, | Performed by: INTERNAL MEDICINE

## 2023-11-17 NOTE — PROGRESS NOTES
Subjective:   Patient ID:  Nany Crenshaw is a 89 y.o. female who presents for follow-up of No chief complaint on file.      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  She's had some inflammation and prominence of her varicosities in the right leg.  She's mainly worried about recurrence of clot of blood thinners.  She denies any worsening cardiopulmonary complaints.  She's not had any chest pain, shortness of breath or palpitations.  She denies any PND, orthopnea or lower edema.  She is not expressing dizziness, presyncope or syncope.     Here for follow-up of DVT and coronary artery disease.  She still has some prominence in varicosities in her legs.  She says at times it gets red around her right ankle but does not cause her pain.  She tolerated her blood thinner as had no problems.  She recently went to [facility] without any issues on the airplane.  Do a trip to [place] planned as well.  She denies any PND, orthopnea or lower extremity edema otherwise.  She's not expressing dizziness, presyncope or syncope.     -cont med tx  -cont to follow sx mainly  -Diet and exercise tolerated  -On Eliquis for OAC indefinitely for recurrent DVT     LE venous US 6/1/22  Thrombosis is seen within the left iliac, common femoral, femoral, popliteal, and peroneal veins.  No evidence of clot involving the right common femoral vein or left greater saphenous and anterior and posterior tibial veins.     lower extremity ultrasound:   11-17  Nonocclusive thrombus in the right femoral vein and right popliteal vein.     Echo 5/24/21  The left ventricle is normal in size with mild concentric hypertrophy and normal systolic function.  The estimated ejection fraction is 65%.  Normal right ventricular size with normal right ventricular systolic function.  Mild aortic regurgitation.  Mild tricuspid regurgitation.  The estimated PA  systolic pressure is 33 mmHg.     Event monitor 5/18/21  Negative event monitor with no clinical arrhythmias.  Symptoms corresponding with normal sinus rhythm.            Carotid ultrasound:  5-18  CONCLUSIONS   There is 20 - 39% right Internal Carotid stenosis.  There is 20 - 39% left Internal Carotid stenosis.     Lower extremity arterial ultrasound:  Impression:  1-  no hemodynamically significant plaque bilaterally     Lower extremity venous ultrasound:  TEST DESCRIPTION   Impression:    RIGHT:  Chronic occlusive DVT of the Femoral Vein.    LEFT:  No evidence of Left lower extremity DVT.       1/6/20 Daughter provided translation  Denies CP or SOB  Walks with a walker at home  EKG NSR LBBB - no change     Went to the ER 4/4/21  This is a 89-year-old  female presents to the emergency room with her grandson who is a 1st year resident at [employer] studying Internal Medicine.  The patient was taking off her slippers and she slipped and fell striking the left side of her thorax.  The patient complains of pain in the left lateral thorax and the left lateral thoracic back to midline spine is spared along its entire course.  There is no shortness of breath or precordial chest discomfort the patient has no abdominal complaints.  She is essentially otherwise well without other injuries or problems she has no extremity injuries.  There is no known head trauma.  The patient notes no abrasions contusions or tender areas on her scalp.  She has no neck pain.  She has no neurologic deficits.  There is no cough or difficulty breathing.        5/14/21 Reports several episodes of weakness with near syncope over the last few weeks  EKG NSR LBBB - no change. Denies CP or SOB   Echo and event monitor for near syncopal spells  Needs to go to the ER if symptoms worsen     6/21/21 Denies CP or SOB. Still with dizzy spells - denies LOC or falls   Event monitor negative for arrhythmias during dizzy spells  Echo with good EF  Continue to  observe - could decrease lisinopril or metoprolol dose if dizzy spells persist  Decrease eliquis 2.5 bid  Continue Rx for HTN, HLD, DVT, CAD      10/1/21 Denies CP, SOB, or dizziness. Mild ankle edema  EKG NSR LBBB  PRN lasix for LE edema    Continue Rx for HTN, HLD, DVT, CAD   OV 6 months     Went to the ER 6/2/22  HPI: This is a 89 y.o.female patient, with a PMHx of DVT, CAD and HTN , presenting to the ED for follow up from her ED visit yesterday regarding swelling of the left leg. Patient's daughter reports patient was here yesterday and diagnosed with DVT to her left leg. Daughter reports patient was told to come back this morning for her 2nd Lovenox injection and to be set up with home health. Daughter reports the patient's leg swelling has decreased and has gotten better since the administration of the first injection last night in the ED. Patient denies any other complications. Patient denies any history of blood clots in the lungs. Patient denies any shortness of breath, chest pain, vomiting, or any other associated symptoms. No known drug allergies.     6/22/22 Went back to eliquis after taking lovenox for 2 weeks. LLE still with mild erythema and swelling. Denies CP or SOB  EKG NSR LBBB   Recurrent DVT on eliquis  Stop eliquis  Start coumadin 5 mg qd  Refer to coumadin clinic  Bridge with lovenox 60 mg bid until INR  > 2  OV 1 month  Continue Rx for HTN, HLD, CAD      8/4/22 INR in range on coumadin. Having some chronic skin changes in LE bilaterally  Denies CP or SOB   Recommended hydrating lotion for chronic skin changes  Continue Rx for HTN, HLD, CAD, DVT  OV 6 months     3/10/23 Here for bilateral foot swelling. Denies CP or SOB  Would like to change coumadin back to eliquis  Occasional dizziness  EKG NSR LBBB  Lasix prn for LE edema  Change coumadin to eliquis 2.5 bid  Continue Rx for HTN, HLD, CAD, DVT  OV 6 month    11/17/23 Denies CP or SOB. Takes an extra lasix if her ankles swell  BP  controlled  Planning a trip to [facility]    Review of Systems   Constitutional: Negative for decreased appetite.   HENT:  Negative for ear discharge.    Eyes:  Negative for blurred vision.   Respiratory:  Negative for hemoptysis.    Endocrine: Negative for polyphagia.   Hematologic/Lymphatic: Negative for adenopathy.   Skin:  Negative for color change.   Musculoskeletal:  Negative for joint swelling.   Genitourinary:  Negative for bladder incontinence.   Neurological:  Negative for brief paralysis.   Psychiatric/Behavioral:  Negative for hallucinations.    Allergic/Immunologic: Negative for hives.       Objective:   Physical Exam  Constitutional:       Appearance: She is well-developed.   HENT:      Head: Normocephalic and atraumatic.   Eyes:      Conjunctiva/sclera: Conjunctivae normal.      Pupils: Pupils are equal, round, and reactive to light.   Neck:      Thyroid: No thyromegaly.   Cardiovascular:      Rate and Rhythm: Normal rate and regular rhythm.      Heart sounds: No murmur heard.  Pulmonary:      Effort: Pulmonary effort is normal. No respiratory distress.      Breath sounds: Normal breath sounds.   Abdominal:      General: Bowel sounds are normal.      Palpations: Abdomen is soft.   Musculoskeletal:         General: Normal range of motion.      Cervical back: Normal range of motion and neck supple.      Comments: Prominent varicosities with some inflammation right greater than left   Skin:     General: Skin is warm and dry.   Neurological:      Mental Status: She is alert and oriented to person, place, and time.   Psychiatric:         Behavior: Behavior normal.         Assessment:      1. Coronary artery disease involving native coronary artery of native heart without angina pectoris    2. Benign hypertension    3. Pure hypercholesterolemia    4. Longstanding persistent atrial fibrillation        Plan:     Continue Rx for HTN, HLD, CAD, DVT  OV 6 month

## 2024-02-12 RX ORDER — LEVOTHYROXINE SODIUM 50 UG/1
50 TABLET ORAL DAILY
Qty: 90 TABLET | Refills: 1 | Status: SHIPPED | OUTPATIENT
Start: 2024-02-12

## 2024-02-12 NOTE — TELEPHONE ENCOUNTER
No care due was identified.  [facility] Embedded Care Due Messages. Reference number: [id number].   2/12/2024 6:10:54 AM CST

## 2024-02-13 NOTE — TELEPHONE ENCOUNTER
Refill Decision Note   Nany Crenshaw  is requesting a refill authorization.  Brief Assessment and Rationale for Refill:  Approve     Medication Therapy Plan:         Comments:     Note composed:6:36 PM 02/12/2024

## 2024-03-08 ENCOUNTER — OFFICE VISIT (OUTPATIENT)
Dept: PODIATRY | Facility: CLINIC | Age: 89
End: 2024-03-08
Payer: MEDICARE

## 2024-03-08 VITALS
DIASTOLIC BLOOD PRESSURE: 65 MMHG | HEART RATE: 68 BPM | BODY MASS INDEX: 24.01 KG/M2 | SYSTOLIC BLOOD PRESSURE: 132 MMHG | WEIGHT: 111.31 LBS | HEIGHT: 57 IN

## 2024-03-08 DIAGNOSIS — Z79.01 LONG TERM CURRENT USE OF ANTICOAGULANT THERAPY: Primary | ICD-10-CM

## 2024-03-08 DIAGNOSIS — B35.1 ONYCHOMYCOSIS DUE TO DERMATOPHYTE: ICD-10-CM

## 2024-03-08 DIAGNOSIS — I73.9 PERIPHERAL VASCULAR DISEASE, UNSPECIFIED: ICD-10-CM

## 2024-03-08 PROCEDURE — 1101F PT FALLS ASSESS-DOCD LE1/YR: CPT | Mod: HCNC,CPTII,S$GLB, | Performed by: PODIATRIST

## 2024-03-08 PROCEDURE — 3288F FALL RISK ASSESSMENT DOCD: CPT | Mod: HCNC,CPTII,S$GLB, | Performed by: PODIATRIST

## 2024-03-08 PROCEDURE — 1159F MED LIST DOCD IN RCRD: CPT | Mod: HCNC,CPTII,S$GLB, | Performed by: PODIATRIST

## 2024-03-08 PROCEDURE — 1157F ADVNC CARE PLAN IN RCRD: CPT | Mod: HCNC,CPTII,S$GLB, | Performed by: PODIATRIST

## 2024-03-08 PROCEDURE — 1126F AMNT PAIN NOTED NONE PRSNT: CPT | Mod: HCNC,CPTII,S$GLB, | Performed by: PODIATRIST

## 2024-03-08 PROCEDURE — 99999 PR PBB SHADOW E&M-EST. PATIENT-LVL III: CPT | Mod: PBBFAC,HCNC,, | Performed by: PODIATRIST

## 2024-03-08 PROCEDURE — 99213 OFFICE O/P EST LOW 20 MIN: CPT | Mod: 25,HCNC,S$GLB, | Performed by: PODIATRIST

## 2024-03-08 PROCEDURE — 11721 DEBRIDE NAIL 6 OR MORE: CPT | Mod: Q9,HCNC,S$GLB, | Performed by: PODIATRIST

## 2024-03-11 NOTE — PROGRESS NOTES
Subjective:      Patient ID: Nany Crenshaw is a 89 y.o. female.    Chief Complaint: Nail Care (With callus)      Nany is a 89 y.o. female who presents to the clinic for evaluation and treatment of high risk feet. Nany has a past medical history of Colon polyp, Coronary artery disease, Diverticulosis, Fracture of right distal radius, GERD (gastroesophageal reflux disease), Hypertension, Hypothyroidism (acquired), Osteoporosis, post-menopausal, and Scoliosis. The patient's chief complaint is long, thick toenails. Gradual onset, worsening over past several weeks, aggravated by increased weight bearing, shoe gear, pressure.  Periodic debridement helps symptoms. Denies trauma, surgery..    PCP: Ivette Mcdaniel MD    Date Last Seen by PCP:   Chief Complaint   Patient presents with    Nail Care     With callus        Current shoe gear:  Affected Foot: Casual shoes     Unaffected Foot: Casual shoes    Last encounter in this department: 9/27/2021    Hemoglobin A1C   Date Value Ref Range Status   01/20/2005 5.6 4.5 - 6.2 % Final       Review of Systems   Constitutional: Negative for chills, diaphoresis, fever, malaise/fatigue and night sweats.   Cardiovascular:  Positive for leg swelling. Negative for claudication, cyanosis and syncope.   Skin:  Positive for nail changes. Negative for color change, dry skin, rash, suspicious lesions and unusual hair distribution.   Musculoskeletal:  Negative for falls, joint pain, joint swelling, muscle cramps, muscle weakness and stiffness.   Gastrointestinal:  Negative for constipation, diarrhea, nausea and vomiting.   Neurological:  Positive for paresthesias and sensory change. Negative for brief paralysis, disturbances in coordination, focal weakness, numbness and tremors.           Objective:      Physical Exam  Constitutional:       General: She is not in acute distress.     Appearance: She is well-developed. She is not diaphoretic.   Cardiovascular:      Pulses:            Popliteal pulses are 2+ on the right side and 2+ on the left side.        Dorsalis pedis pulses are 1+ on the right side and 1+ on the left side.        Posterior tibial pulses are 1+ on the right side and 1+ on the left side.      Comments: Capillary refill 3 seconds all toes/distal feet, all toes/both feet warm to touch.      Negative lymphadenopathy bilateral popliteal fossa and tarsal tunnel.      <2+ pitting lower extremity edema bilateral.    Musculoskeletal:      Right ankle: No swelling, deformity, ecchymosis or lacerations. Normal range of motion. Normal pulse.      Right Achilles Tendon: Normal. No defects. Jeff's test negative.      Comments: Reduced stride and toe off.. All ten toes without clubbing, cyanosis, or signs of ischemia.  No pain to palpation bilateral lower extremities.  Range of motion, stability, muscle strength, and muscle tone normal bilateral feet and legs.    Lymphadenopathy:      Lower Body: No right inguinal adenopathy. No left inguinal adenopathy.      Comments: Negative lymphadenopathy bilateral popliteal fossa and tarsal tunnel.    Negative lymphangitic streaking bilateral feet/ankles/legs.   Skin:     General: Skin is warm and dry.      Capillary Refill: Capillary refill takes 2 to 3 seconds.      Coloration: Skin is not pale.      Findings: No abrasion, bruising, burn, ecchymosis, erythema, laceration, lesion or rash.      Nails: There is no clubbing.      Comments:   Skin thin, shiny, atrophic, with decreased density and distribution of pedal hair bilateral, but without hyperpigmentation, boris discoloration,  ulcers, masses, nodules or cords palpated bilateral feet and legs.    Toenails 1st, 2nd, 3rd, 4th, 5th  bilateral are hypertrophic thickened 2-3 mm, dystrophic, discolored tanish brown with tan, gray crumbly subungual debris.  Tender to distal nail plate pressure, without periungual skin abnormality of each.       Neurological:      Mental Status: She is alert and  oriented to person, place, and time.      Sensory: No sensory deficit.      Motor: No tremor, atrophy or abnormal muscle tone.      Gait: Gait normal.      Deep Tendon Reflexes:      Reflex Scores:       Patellar reflexes are 2+ on the right side and 2+ on the left side.       Achilles reflexes are 2+ on the right side and 2+ on the left side.     Comments: Paresthesias,intermittently bilateral feet with no clearly identified trigger or source.    Negative tinel sign to percussion sural, superficial peroneal, deep peroneal, saphenous, and posterior tibial nerves right and left ankles and feet.     Psychiatric:         Behavior: Behavior is cooperative.             Assessment:       Encounter Diagnoses   Name Primary?    Long term current use of anticoagulant therapy Yes    Onychomycosis due to dermatophyte     Peripheral vascular disease, unspecified            Plan:       Nany was seen today for nail care.    Diagnoses and all orders for this visit:    Long term current use of anticoagulant therapy    Onychomycosis due to dermatophyte    Peripheral vascular disease, unspecified        I counseled the patient on her conditions, their implications and medical management.        With the patient's permission, I debrided all ten toenails with a sterile nipper and curette, removing all offending nail and debris.  Patient tolerated the procedure well and related significant relief.    Discussed conservative treatment with shoes of adequate dimensions, material, and style to alleviate symptoms and delay or prevent surgical intervention.    Continue penlac            No follow-ups on file.

## 2024-03-21 ENCOUNTER — TELEPHONE (OUTPATIENT)
Dept: DERMATOLOGY | Facility: CLINIC | Age: 89
End: 2024-03-21
Payer: MEDICARE

## 2024-03-21 NOTE — TELEPHONE ENCOUNTER
Spoke with pt's daughter (Marisol). Wanted to schedule a skin check for pt. Recommended she make an appt with her general dermatologist. Pt's daughter confirmed understanding.

## 2024-04-16 ENCOUNTER — OFFICE VISIT (OUTPATIENT)
Dept: DERMATOLOGY | Facility: CLINIC | Age: 89
End: 2024-04-16
Payer: MEDICARE

## 2024-04-16 DIAGNOSIS — L57.0 ACTINIC KERATOSES: Primary | ICD-10-CM

## 2024-04-16 DIAGNOSIS — D48.5 NEOPLASM OF UNCERTAIN BEHAVIOR OF SKIN: ICD-10-CM

## 2024-04-16 PROCEDURE — 99213 OFFICE O/P EST LOW 20 MIN: CPT | Mod: 25,HCNC,S$GLB, | Performed by: DERMATOLOGY

## 2024-04-16 PROCEDURE — 17003 DESTRUCT PREMALG LES 2-14: CPT | Mod: HCNC,S$GLB,, | Performed by: DERMATOLOGY

## 2024-04-16 PROCEDURE — 99999 PR PBB SHADOW E&M-EST. PATIENT-LVL II: CPT | Mod: PBBFAC,HCNC,, | Performed by: DERMATOLOGY

## 2024-04-16 PROCEDURE — 1159F MED LIST DOCD IN RCRD: CPT | Mod: HCNC,CPTII,S$GLB, | Performed by: DERMATOLOGY

## 2024-04-16 PROCEDURE — 1157F ADVNC CARE PLAN IN RCRD: CPT | Mod: HCNC,CPTII,S$GLB, | Performed by: DERMATOLOGY

## 2024-04-16 PROCEDURE — 17000 DESTRUCT PREMALG LESION: CPT | Mod: HCNC,S$GLB,, | Performed by: DERMATOLOGY

## 2024-04-16 PROCEDURE — 1160F RVW MEDS BY RX/DR IN RCRD: CPT | Mod: HCNC,CPTII,S$GLB, | Performed by: DERMATOLOGY

## 2024-04-16 NOTE — PROGRESS NOTES
Subjective:      Patient ID:  Nany Crenshaw is a 89 y.o. female who presents for No chief complaint on file.    Patient is a 89F with PMHx of HTN, DVT, SCCis of forehead s/p MOHS 2022 presenting to clinic for evaluation of a skin lesion.     Patient is present with daughter, who assisted with history and interpretation.    Has not been seen by derm since 2022. Patient with several lesions of concern to the face and arms. Asymptomatic. Gradually growing and present for months to years. Has not tried any treatments for these.          Review of Systems   Constitutional: Negative.    Respiratory: Negative.     Cardiovascular: Negative.        Objective:   Physical Exam   Constitutional: She appears well-developed. She is in a wheelchair.    Neurological: She is alert and oriented to person, place, and time.   Psychiatric: She has a normal mood and affect.   Skin:   Areas Examined (abnormalities noted in diagram):   Scalp / Hair Palpated and Inspected  Head / Face Inspection Performed  Neck Inspection Performed  Chest / Axilla Inspection Performed  Abdomen Inspection Performed  Back Inspection Performed  RUE Inspected  LUE Inspection Performed                Diagram Legend     Erythematous scaling macule/papule c/w actinic keratosis       Vascular papule c/w angioma      Pigmented verrucoid papule/plaque c/w seborrheic keratosis      Yellow umbilicated papule c/w sebaceous hyperplasia      Irregularly shaped tan macule c/w lentigo     1-2 mm smooth white papules consistent with Milia      Movable subcutaneous cyst with punctum c/w epidermal inclusion cyst      Subcutaneous movable cyst c/w pilar cyst      Firm pink to brown papule c/w dermatofibroma      Pedunculated fleshy papule(s) c/w skin tag(s)      Evenly pigmented macule c/w junctional nevus     Mildly variegated pigmented, slightly irregular-bordered macule c/w mildly atypical nevus      Flesh colored to evenly pigmented papule c/w intradermal nevus       Pink  pearly papule/plaque c/w basal cell carcinoma      Erythematous hyperkeratotic cursted plaque c/w SCC      Surgical scar with no sign of skin cancer recurrence      Open and closed comedones      Inflammatory papules and pustules      Verrucoid papule consistent consistent with wart     Erythematous eczematous patches and plaques     Dystrophic onycholytic nail with subungual debris c/w onychomycosis     Umbilicated papule    Erythematous-base heme-crusted tan verrucoid plaque consistent with inflamed seborrheic keratosis     Erythematous Silvery Scaling Plaque c/w Psoriasis     See annotation      Assessment / Plan:        Diagnoses and all orders for this visit:    Actinic keratoses  Neoplasm of uncertain behavior of skin  - patient with many skin lesions on the bilateral arms, face that are concerning for at least actinic keratosis, with several very concerning nonmelanoma skin cancer (atleast 6 (3x right arm, 3x left arm)  - pt also with concerning hyperkeratotic papule within previous mohs site on central forehead (possible recurrence).   - in depth discussion had with patient and her daughter regarding treatment options. Discussed possible biopsy of lesions, however, patient and daughter state they would be uninterested in definitive removal. Also discussed broad shave to try to achieve clearance to debulk.  Patient and daughter would like to pursue non surgical options at this time given that these skin cancers tend to grow only in skin and not metastasize.  - after discussion, patient and daughter opted to attempt serial cryotherapy for lesions.  - Cryosurgery to 10 actinic keratoses - (2x face, 4x left arm, 4x right arm) Discussed with patient that areas treated with blister (possibly for blood blister), scab and peel off within the next two to three weeks.  Cryosurgery Procedure Note    Verbal consent from the patient is obtained including, but not limited to, risk of hypopigmentation/hyperpigmentation,  scar, recurrence of lesion. The patient is aware of the precancerous quality and need for treatment of these lesions. Liquid nitrogen cryosurgery is applied to the 10 actinic keratoses, as detailed in the physical exam, to produce a freeze injury. The patient is aware that blisters may form and is instructed on wound care with gentle cleansing and use of vaseline ointment to keep moist until healed. The patient is supplied a handout on cryosurgery and is instructed to call if lesions do not completely resolve.    - RTC 5/8/2024 @ 1400 for Dr. Brown                No follow-ups on file.

## 2024-04-17 NOTE — PROGRESS NOTES
I have seen the patient, reviewed the Resident's progress note. I have personally interviewed and examined the patient at bedside and agree with the findings.     Kylee Brown MD  [facility]

## 2024-04-27 ENCOUNTER — ON-DEMAND VIRTUAL (OUTPATIENT)
Dept: URGENT CARE | Facility: CLINIC | Age: 89
End: 2024-04-27
Payer: MEDICARE

## 2024-04-27 DIAGNOSIS — S80.11XA CONTUSION OF RIGHT LOWER LEG, INITIAL ENCOUNTER: Primary | ICD-10-CM

## 2024-04-27 PROCEDURE — 99214 OFFICE O/P EST MOD 30 MIN: CPT | Mod: 95,,, | Performed by: PHYSICIAN ASSISTANT

## 2024-04-27 RX ORDER — MUPIROCIN 20 MG/G
OINTMENT TOPICAL 2 TIMES DAILY
Qty: 22 G | Refills: 0 | Status: SHIPPED | OUTPATIENT
Start: 2024-04-27 | End: 2024-05-02

## 2024-04-28 NOTE — PROGRESS NOTES
Subjective:      Patient ID: Nany Crenshaw is a 89 y.o. female.    Vitals:  vitals were not taken for this visit.     Chief Complaint: Wound Check (Fell one week ago and hit right lower leg)      Visit Type: TELE AUDIOVISUAL    Present with the patient at the time of consultation: TELEMED PRESENT WITH PATIENT: family member, Marisol (daughter)    Past Medical History:   Diagnosis Date    Colon polyp     no further colon needed    Coronary artery disease     Diverticulosis     Fracture of right distal radius     GERD (gastroesophageal reflux disease)     Hypertension     Hypothyroidism (acquired)     Osteoporosis, post-menopausal     Scoliosis      Past Surgical History:   Procedure Laterality Date    ADENOIDECTOMY      CATARACT EXTRACTION      ou     SECTION      CHOLECYSTECTOMY      EYE SURGERY  2014    ptosis surgery      HYSTERECTOMY      yag cap      od     Review of patient's allergies indicates:   Allergen Reactions    Bactrim [sulfamethoxazole-trimethoprim] Nausea And Vomiting     Current Outpatient Medications on File Prior to Visit   Medication Sig Dispense Refill    apixaban (ELIQUIS) 2.5 mg Tab Take 1 tablet (2.5 mg total) by mouth 2 (two) times daily. 180 tablet 3    ciclopirox (PENLAC) 8 % Soln Apply topically nightly. 6.6 mL 11    ezetimibe (ZETIA) 10 mg tablet Take 1 tablet (10 mg total) by mouth once daily. 90 tablet 0    furosemide (LASIX) 20 MG tablet Take 1 tablet (20 mg total) by mouth daily as needed (ankle swelling). 30 tablet 11    levothyroxine (SYNTHROID) 50 MCG tablet Take 1 tablet (50 mcg total) by mouth once daily. 90 tablet 1    lisinopriL 10 MG tablet TAKE 1 AND 1/2 TABLETS(15 MG) BY MOUTH EVERY  tablet 1    metoprolol tartrate (LOPRESSOR) 25 MG tablet TAKE 1 TABLET BY MOUTH TWICE DAILY 180 tablet 4    nystatin (MYCOSTATIN) cream Apply topically 2 (two) times daily as needed for Dry Skin. 30 g 2    simvastatin (ZOCOR) 40 MG tablet TAKE 1 TABLET(40 MG) BY MOUTH EVERY  "EVENING 90 tablet 3     No current facility-administered medications on file prior to visit.     Family History   Problem Relation Name Age of Onset    Cancer Mother          skin cancer     Dementia Mother      Hypertension Mother      Heart disease Brother      Cancer Brother          lung cancer     No Known Problems Father      No Known Problems Sister      No Known Problems Maternal Aunt      No Known Problems Maternal Uncle      No Known Problems Paternal Aunt      No Known Problems Paternal Uncle      No Known Problems Maternal Grandmother      No Known Problems Maternal Grandfather      No Known Problems Paternal Grandmother      No Known Problems Paternal Grandfather      Amblyopia Neg Hx      Blindness Neg Hx      Cataracts Neg Hx      Diabetes Neg Hx      Glaucoma Neg Hx      Macular degeneration Neg Hx      Retinal detachment Neg Hx      Strabismus Neg Hx      Stroke Neg Hx      Thyroid disease Neg Hx      Melanoma Neg Hx         Medications Ordered                [id number] - [place] [address] AT [facility] [address] [employer] [address]   [facility] [email or web address] [address]    Telephone: [phone]   Fax: [phone]   Hours: Not open 24 hours                         E-Prescribed (1 of 1)              mupirocin (BACTROBAN) 2 % ointment    Sig: Apply topically 2 (two) times daily. for 5 days       Start: 4/27/24     Quantity: 22 g Refills: 0                           Ohs Peq Odvv Intake    4/27/2024  7:53 PM CDT - Filed by Patient   What is your current physical address in the event of a medical emergency? [address] [place] [address]   Are you able to take your vital signs? No   Please attach any relevant images or files          Pt c/o "burning" to right lower leg along bruised area for 1 day. Pt tripped and fell one week ago, hitting her right lower leg.  Pt's daughter states the fall was not witnessed but pt was picked up from the floor immediately after the fall.  Denies LOC, vomiting, head " trauma, or changes in behavior.  Denies fever, warmth, tenderness, or purulent drainage.  Daughter states the area began as a bruise and was concerned as pt takes blood thinners.        Constitution: Negative for chills and fever.   Gastrointestinal:  Negative for vomiting.   Musculoskeletal:  Positive for trauma. Negative for pain.   Skin:  Positive for erythema and bruising.   Neurological:  Negative for disorientation, altered mental status and loss of consciousness.   Psychiatric/Behavioral:  Negative for altered mental status, disorientation and confusion.         Objective:   The physical exam was conducted virtually.  Physical Exam   Constitutional: She is oriented to person, place, and time.  Non-toxic appearance. She does not appear ill. No distress.   HENT:   Head: Normocephalic.   Eyes: Right eye exhibits no discharge. Left eye exhibits no discharge.   Pulmonary/Chest: Effort normal. No respiratory distress.   Neurological: no focal deficit. She is alert and oriented to person, place, and time.   Skin: bruising and erythema         Comments: Bruising to right lower leg with surrounding erythema; no drainage; mild swelling to both lower legs which is normal for pt   Psychiatric: Her behavior is normal. Mood normal.       Assessment:     1. Contusion of right lower leg, initial encounter        Plan:       Contusion of right lower leg, initial encounter  -     mupirocin (BACTROBAN) 2 % ointment; Apply topically 2 (two) times daily. for 5 days  Dispense: 22 g; Refill: 0      Patient Instructions   Follow up with PCP in 2 days if redness worsens.  Go to ER if red streaking, warmth to area, drainage, or fever.  Apply Bactroban to areas of redness.  Apply ice to area.              Patient Education        Contusion Discharge Instructions   About this topic   A contusion is also called a bruise. A bruise happens when blood vessels under the skin break. The blood leaks into the tissues and causes pain and  swelling. It also causes skin discoloration that starts as red, blue, or purple and changes to green or yellow as the bruise heals.     What care is needed at home?   Ask your doctor what you need to do when you go home. Make sure you ask questions if you do not understand what the doctor says.  Rest your bruised area. You may want to place the bruised area on pillows when you rest. Slowly increase your activity level as you are able to.  Use an elastic bandage or compression pants to help limit swelling.  Place an ice pack or a bag of frozen vegetables wrapped in a towel over the painful part. Never put ice right on the skin. Use ice every 1 to 2 hours for 10 to 15 minutes at a time. Use for the first 24 to 48 hours after your injury.  You may want to take medicine like ibuprofen, naproxen, or acetaminophen to help with pain.  What follow-up care is needed?   Your doctor may ask you to make visits to the office to check on your progress. Be sure to keep these visits.  What drugs may be needed?   The doctor may order drugs to:  Help with pain and swelling  Will physical activity be limited?   Physical activity may be limited based on where the contusion is found. Talk to your doctor about the right amount of activity for you. Ask your doctor when you can go back to your normal activities and when you can return to work.  What can be done to prevent this health problem?   Avoid activities that might make you fall.  Wear or use equipment to protect yourself from being hurt.  When do I need to call the doctor?   Your joint swells.  You are not able to move or walk because of the pain.  You have bruises for no reason.  You develop bleeding in addition to skin bruises.  Teach Back: Helping You Understand   The Teach Back Method helps you understand the information we are giving you. After you talk with the staff, tell them in your own words what you learned. This helps to make sure the staff has described each thing  clearly. It also helps to explain things that may have been confusing. Before going home, make sure you can do these:  I can tell you about my condition.  I can tell you what may help ease my pain.  I can tell you what I will do if the swelling and pain does not go away.  Where can I learn more?   [employer]  https://[employer].org/en/teens/bruises.html?ref=search   NHS Choices  https://www.nhs.uk/chq/Pages/1057.aspx   Last Reviewed Date   2021-06-07  Consumer Information Use and Disclaimer   This information is not specific medical advice and does not replace information you receive from your health care provider. This is only a brief summary of general information. It does NOT include all information about conditions, illnesses, injuries, tests, procedures, treatments, therapies, discharge instructions or life-style choices that may apply to you. You must talk with your health care provider for complete information about your health and treatment options. This information should not be used to decide whether or not to accept your health care providers advice, instructions or recommendations. Only your health care provider has the knowledge and training to provide advice that is right for you.  Copyright   Copyright © 2021 [employer] and its affiliates and/or licensors. All rights reserved.

## 2024-04-28 NOTE — PATIENT INSTRUCTIONS
Follow up with PCP in 2 days if redness worsens.  Go to ER if red streaking, warmth to area, drainage, or fever.  Apply Bactroban to areas of redness.  Apply ice to area.

## 2024-04-29 ENCOUNTER — OFFICE VISIT (OUTPATIENT)
Dept: FAMILY MEDICINE | Facility: CLINIC | Age: 89
End: 2024-04-29
Payer: MEDICARE

## 2024-04-29 VITALS
SYSTOLIC BLOOD PRESSURE: 104 MMHG | TEMPERATURE: 97 F | HEIGHT: 57 IN | HEART RATE: 53 BPM | WEIGHT: 111.31 LBS | BODY MASS INDEX: 24.01 KG/M2 | DIASTOLIC BLOOD PRESSURE: 52 MMHG | OXYGEN SATURATION: 94 %

## 2024-04-29 DIAGNOSIS — I95.9 HYPOTENSION, UNSPECIFIED HYPOTENSION TYPE: ICD-10-CM

## 2024-04-29 DIAGNOSIS — S80.12XD TRAUMATIC ECCHYMOSIS OF LEFT LOWER LEG, SUBSEQUENT ENCOUNTER: ICD-10-CM

## 2024-04-29 DIAGNOSIS — L03.90 CELLULITIS, UNSPECIFIED CELLULITIS SITE: Primary | ICD-10-CM

## 2024-04-29 DIAGNOSIS — R42 DIZZINESS: ICD-10-CM

## 2024-04-29 DIAGNOSIS — I48.11 LONGSTANDING PERSISTENT ATRIAL FIBRILLATION: ICD-10-CM

## 2024-04-29 PROCEDURE — 99214 OFFICE O/P EST MOD 30 MIN: CPT | Mod: HCNC,S$GLB,, | Performed by: FAMILY MEDICINE

## 2024-04-29 PROCEDURE — 1157F ADVNC CARE PLAN IN RCRD: CPT | Mod: HCNC,CPTII,S$GLB, | Performed by: FAMILY MEDICINE

## 2024-04-29 PROCEDURE — 1126F AMNT PAIN NOTED NONE PRSNT: CPT | Mod: HCNC,CPTII,S$GLB, | Performed by: FAMILY MEDICINE

## 2024-04-29 PROCEDURE — 99999 PR PBB SHADOW E&M-EST. PATIENT-LVL IV: CPT | Mod: PBBFAC,HCNC,, | Performed by: FAMILY MEDICINE

## 2024-04-29 PROCEDURE — 1101F PT FALLS ASSESS-DOCD LE1/YR: CPT | Mod: HCNC,CPTII,S$GLB, | Performed by: FAMILY MEDICINE

## 2024-04-29 PROCEDURE — 3288F FALL RISK ASSESSMENT DOCD: CPT | Mod: HCNC,CPTII,S$GLB, | Performed by: FAMILY MEDICINE

## 2024-04-29 PROCEDURE — 1159F MED LIST DOCD IN RCRD: CPT | Mod: HCNC,CPTII,S$GLB, | Performed by: FAMILY MEDICINE

## 2024-04-29 RX ORDER — CEPHALEXIN 500 MG/1
500 CAPSULE ORAL EVERY 12 HOURS
Qty: 14 CAPSULE | Refills: 0 | Status: SHIPPED | OUTPATIENT
Start: 2024-04-29 | End: 2024-05-10

## 2024-04-29 NOTE — PROGRESS NOTES
Chief Complaint   Patient presents with    Leg Pain     Redness and burning on right leg close to shin, pt states she fell on 04/19/24  dizziness       HPI  Nany Crenshaw is a 89 y.o. female with multiple medical diagnoses as listed in the medical history and problem list that presents for follow-up for leg wound and dizziness, hx of atrial fibrillation, HTN, right LE DVT on eliquis    Leg wound- she had a fall April 19th, she did not trip over anything but has been feeling dizzy for some time now; seen virtually April 27th and given bactroban ointment for the wound,has not drained is having burning and some small open areas  Dizziness- has had this and fatigue, Bp readings are on the lower side, ambulates with walker      ALLERGIES AND MEDICATIONS: updated and reviewed.  Review of patient's allergies indicates:   Allergen Reactions    Bactrim [sulfamethoxazole-trimethoprim] Nausea And Vomiting     Medication List with Changes/Refills   New Medications    CEPHALEXIN (KEFLEX) 500 MG CAPSULE    Take 1 capsule (500 mg total) by mouth every 12 (twelve) hours. for 7 days   Current Medications    APIXABAN (ELIQUIS) 2.5 MG TAB    Take 1 tablet (2.5 mg total) by mouth 2 (two) times daily.    CICLOPIROX (PENLAC) 8 % SOLN    Apply topically nightly.    EZETIMIBE (ZETIA) 10 MG TABLET    Take 1 tablet (10 mg total) by mouth once daily.    FUROSEMIDE (LASIX) 20 MG TABLET    Take 1 tablet (20 mg total) by mouth daily as needed (ankle swelling).    LEVOTHYROXINE (SYNTHROID) 50 MCG TABLET    Take 1 tablet (50 mcg total) by mouth once daily.    LISINOPRIL 10 MG TABLET    TAKE 1 AND 1/2 TABLETS(15 MG) BY MOUTH EVERY DAY    METOPROLOL TARTRATE (LOPRESSOR) 25 MG TABLET    TAKE 1 TABLET BY MOUTH TWICE DAILY    MUPIROCIN (BACTROBAN) 2 % OINTMENT    Apply topically 2 (two) times daily. for 5 days    NYSTATIN (MYCOSTATIN) CREAM    Apply topically 2 (two) times daily as needed for Dry Skin.    SIMVASTATIN (ZOCOR) 40 MG TABLET    TAKE 1  "TABLET(40 MG) BY MOUTH EVERY EVENING       ROS  Review of Systems   Constitutional:  Positive for fever. Negative for chills, diaphoresis, fatigue and unexpected weight change.   HENT:  Negative for rhinorrhea, sinus pressure, sore throat and tinnitus.    Eyes:  Negative for photophobia and visual disturbance.   Respiratory:  Negative for cough, shortness of breath and wheezing.    Cardiovascular:  Negative for chest pain and palpitations.   Gastrointestinal:  Negative for abdominal pain, blood in stool, constipation, diarrhea, nausea and vomiting.   Genitourinary:  Negative for dysuria, flank pain, frequency and vaginal discharge.   Musculoskeletal:  Negative for arthralgias and joint swelling.   Skin:  Negative for rash.   Neurological:  Positive for dizziness. Negative for speech difficulty, weakness, light-headedness and headaches.   Psychiatric/Behavioral:  Negative for behavioral problems and dysphoric mood.        Physical Exam  Vitals:    04/29/24 0815   BP: (!) 104/52   BP Location: Left arm   Patient Position: Sitting   BP Method: Large (Manual)   Pulse: (!) 53   Temp: 97.4 °F (36.3 °C)   TempSrc: Oral   SpO2: (!) 94%   Weight: 50.5 kg (111 lb 5.3 oz)   Height: 4' 9" (1.448 m)    Body mass index is 24.09 kg/m².  Weight: 50.5 kg (111 lb 5.3 oz)   Height: 4' 9" (144.8 cm)     Physical Exam  Vitals and nursing note reviewed.   Constitutional:       Appearance: She is well-developed.   Skin:     General: Skin is warm and dry.      Findings: No erythema or rash.          Neurological:      Mental Status: She is alert. Mental status is at baseline.   Psychiatric:         Behavior: Behavior normal.             Health Maintenance         Date Due Completion Date    TETANUS VACCINE Never done ---    Shingles Vaccine (1 of 2) Never done ---    RSV Vaccine (Age 60+ and Pregnant patients) (1 - 1-dose 60+ series) Never done ---    Influenza Vaccine (1) 09/01/2023 12/6/2019    COVID-19 Vaccine (4 - 2023-24 season) " 09/01/2023 11/12/2021    DEXA Scan 02/17/2024 2/17/2022    Lipid Panel 06/05/2024 6/5/2023            Health maintenance reviewed and addressed as ordered      ASSESSMENT/PLAN       1. Cellulitis, unspecified cellulitis site  Begin abx due to erythema  Suspect this is due to delayed healing of her skin but could be early infection  If no improvement in 48 hours consider ED eval for IV abx  - cephALEXin (KEFLEX) 500 MG capsule; Take 1 capsule (500 mg total) by mouth every 12 (twelve) hours. for 7 days  Dispense: 14 capsule; Refill: 0    2. Traumatic ecchymosis of left lower leg, subsequent encounter  Recommend she elevate her leg and apply ice for 15 min intervals BID    3. Longstanding persistent atrial fibrillation  On eliquis may make her more prone to bruising     4. Dizziness  Decrease lisinopril to 10mg from 15mg    5. Hypotension, unspecified hypotension type    Decrease lisinopril to 10mg from 15mg  Schedule f/u with PCP for BP    Kyra Tabares MD  04/29/2024 8:20 AM        Follow up if symptoms worsen or fail to improve.    No orders of the defined types were placed in this encounter.

## 2024-05-02 ENCOUNTER — HOSPITAL ENCOUNTER (EMERGENCY)
Facility: HOSPITAL | Age: 89
Discharge: HOME | End: 2024-05-02
Attending: EMERGENCY MEDICINE
Payer: MEDICARE

## 2024-05-02 VITALS
HEART RATE: 61 BPM | SYSTOLIC BLOOD PRESSURE: 196 MMHG | TEMPERATURE: 98 F | BODY MASS INDEX: 24.02 KG/M2 | RESPIRATION RATE: 22 BRPM | OXYGEN SATURATION: 99 % | DIASTOLIC BLOOD PRESSURE: 83 MMHG | WEIGHT: 111 LBS

## 2024-05-02 DIAGNOSIS — M79.605 BILATERAL LOWER EXTREMITY PAIN: ICD-10-CM

## 2024-05-02 DIAGNOSIS — Z86.718 HISTORY OF DVT (DEEP VEIN THROMBOSIS): ICD-10-CM
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
## 2024-05-02 DIAGNOSIS — M79.604 BILATERAL LOWER EXTREMITY PAIN: ICD-10-CM

## 2024-05-02 LAB
ALBUMIN SERPL BCP-MCNC: 3.5 G/DL (ref 3.5–5.2)
ALP SERPL-CCNC: 66 U/L (ref 55–135)
ALT SERPL W/O P-5'-P-CCNC: 15 U/L (ref 10–44)
ANION GAP SERPL CALC-SCNC: 11 MMOL/L (ref 8–16)
APTT PPP: 26.9 SEC (ref 21–32)
AST SERPL-CCNC: 21 U/L (ref 10–40)
BASOPHILS # BLD AUTO: 0.03 K/UL (ref 0–0.2)
BASOPHILS NFR BLD: 0.5 % (ref 0–1.9)
BILIRUB SERPL-MCNC: 1 MG/DL (ref 0.1–1)
BNP SERPL-MCNC: 27 PG/ML (ref 0–99)
BUN SERPL-MCNC: 10 MG/DL (ref 10–30)
CALCIUM SERPL-MCNC: 9.4 MG/DL (ref 8.7–10.5)
CHLORIDE SERPL-SCNC: 104 MMOL/L (ref 95–110)
CO2 SERPL-SCNC: 24 MMOL/L (ref 23–29)
CREAT SERPL-MCNC: 0.7 MG/DL (ref 0.5–1.4)
DIFFERENTIAL METHOD BLD: ABNORMAL
EOSINOPHIL # BLD AUTO: 0 K/UL (ref 0–0.5)
EOSINOPHIL NFR BLD: 0.4 % (ref 0–8)
ERYTHROCYTE [DISTWIDTH] IN BLOOD BY AUTOMATED COUNT: 13.4 % (ref 11.5–14.5)
EST. GFR  (NO RACE VARIABLE): >60 ML/MIN/1.73 M^2
GLUCOSE SERPL-MCNC: 91 MG/DL (ref 70–110)
HCT VFR BLD AUTO: 40 % (ref 37–48.5)
HGB BLD-MCNC: 12.8 G/DL (ref 12–16)
IMM GRANULOCYTES # BLD AUTO: 0.01 K/UL (ref 0–0.04)
IMM GRANULOCYTES NFR BLD AUTO: 0.2 % (ref 0–0.5)
LYMPHOCYTES # BLD AUTO: 0.6 K/UL (ref 1–4.8)
LYMPHOCYTES NFR BLD: 10.6 % (ref 18–48)
MAGNESIUM SERPL-MCNC: 2.2 MG/DL (ref 1.6–2.6)
MCH RBC QN AUTO: 29.1 PG (ref 27–31)
MCHC RBC AUTO-ENTMCNC: 32 G/DL (ref 32–36)
MCV RBC AUTO: 91 FL (ref 82–98)
MONOCYTES # BLD AUTO: 0.3 K/UL (ref 0.3–1)
MONOCYTES NFR BLD: 4.9 % (ref 4–15)
NEUTROPHILS # BLD AUTO: 4.6 K/UL (ref 1.8–7.7)
NEUTROPHILS NFR BLD: 83.4 % (ref 38–73)
NRBC BLD-RTO: 0 /100 WBC
OHS QRS DURATION: 124 MS
OHS QTC CALCULATION: 450 MS
PLATELET # BLD AUTO: 224 K/UL (ref 150–450)
PMV BLD AUTO: 9.1 FL (ref 9.2–12.9)
POTASSIUM SERPL-SCNC: 4.2 MMOL/L (ref 3.5–5.1)
PROT SERPL-MCNC: 6.3 G/DL (ref 6–8.4)
RBC # BLD AUTO: 4.4 M/UL (ref 4–5.4)
SODIUM SERPL-SCNC: 139 MMOL/L (ref 136–145)
TROPONIN I SERPL DL<=0.01 NG/ML-MCNC: <0.006 NG/ML (ref 0–0.03)
WBC # BLD AUTO: 5.55 K/UL (ref 3.9–12.7)

## 2024-05-02 PROCEDURE — 93010 ELECTROCARDIOGRAM REPORT: CPT | Mod: HCNC,,, | Performed by: INTERNAL MEDICINE

## 2024-05-02 PROCEDURE — 85025 COMPLETE CBC W/AUTO DIFF WBC: CPT | Mod: HCNC | Performed by: EMERGENCY MEDICINE

## 2024-05-02 PROCEDURE — 84484 ASSAY OF TROPONIN QUANT: CPT | Mod: HCNC | Performed by: EMERGENCY MEDICINE

## 2024-05-02 PROCEDURE — 99285 EMERGENCY DEPT VISIT HI MDM: CPT | Mod: 25,HCNC

## 2024-05-02 PROCEDURE — 93005 ELECTROCARDIOGRAM TRACING: CPT | Mod: HCNC

## 2024-05-02 PROCEDURE — 85730 THROMBOPLASTIN TIME PARTIAL: CPT | Mod: HCNC | Performed by: EMERGENCY MEDICINE

## 2024-05-02 PROCEDURE — 80053 COMPREHEN METABOLIC PANEL: CPT | Mod: HCNC | Performed by: EMERGENCY MEDICINE

## 2024-05-02 PROCEDURE — 83735 ASSAY OF MAGNESIUM: CPT | Mod: HCNC | Performed by: EMERGENCY MEDICINE

## 2024-05-02 PROCEDURE — 83880 ASSAY OF NATRIURETIC PEPTIDE: CPT | Mod: HCNC | Performed by: EMERGENCY MEDICINE

## 2024-05-02 NOTE — DISCHARGE INSTRUCTIONS
Emergency department testing is within acceptable ranges.  Kidney function, electrolytes are within normal ranges.  Ultrasound shows DVT in the left leg that is improved compared to prior.  It is important that you take your blood thinner and blood pressure medication as you have been prescribed.  Complete the course of antibiotics that you are taking.  Schedule close follow-up with your primary physician.  Return to the emergency department for any new, worsening or significantly concerning symptoms.    Thank you for coming to our Emergency Department today. It is important to remember that some problems are difficult to diagnose and may not be found during your first visit. Be sure to follow up with your primary care doctor and review any labs/imaging that was performed with them. If you do not have a primary care doctor, you may contact the one listed on your discharge paperwork or you may also call the [facility] Appointment Desk at [phone] to schedule an appointment with one.     All medications may potentially have side effects and it is impossible to predict which medications may give you side effects. If you feel that you are having a negative effect of any medication you should immediately stop taking them and seek medical attention.    Return to the ER with any questions/concerns, new/concerning symptoms, worsening or failure to improve. Do not drive or make any important decisions for 24 hours if you have received any pain medications, sedatives or mood altering drugs during your ER visit.

## 2024-05-02 NOTE — ED PROVIDER NOTES
Encounter Date: 5/2/2024    SCRIBE #1 NOTE: I, Dionna Florian, am scribing for, and in the presence of,  Jeremi Lai MD. I have scribed the following portions of the note - Other sections scribed: HPI, ROS, PE, EKG, Differential.       History     Chief Complaint   Patient presents with    Cellulitis     Pt dx with cellulitis of RLE starting on Monday. Place on antibiotics. Told to come to the ED today if no improvement in symptoms. Per daughter, she thinks the other leg is red and warm. Left leg cold to touch. Redness noted. Afebrile.      This 89 y.o female, with a medical history of Coronary artery disease, Hypertension, and DVT (bilateral), presents to the ED accompanied by her daughter c/o bruising and redness to the bilateral lower legs. Daughter reports that pt began to experience bruising to the right lower leg s/p a fall on 4/19/24. No loss of consciousness or injuries. She states that pt later began to c/o a burning sensation to the area and notes that the bruise began to increase in size and was surrounded by redness. Daughter reports that pt visited her PCP last week (4/29/24) for the symptoms and was placed on 500 mg Keflex for treatment. Daughter states, however, that she became concerned as this morning pt's bilateral lower legs were hot to the touch (up to the knees). She notes that the redness also appears to be moving up the right leg and adds that pt now also has an area of bruising to the left lower leg as well prompting her to bring pt into the ED today. Of note, pt is currently on Eliquis, which daughter believes she is compliant with. Pt denies any pain at this time. She also denies fever, chest pain, shortness of breath or any other associated symptoms.     The history is provided by the patient. The history is limited by a language barrier (Patient is Korean speaking). A  was used (Daughter is translating for patient).     Review of patient's allergies indicates:    Allergen Reactions    Bactrim [sulfamethoxazole-trimethoprim] Nausea And Vomiting     Past Medical History:   Diagnosis Date    Colon polyp     no further colon needed    Coronary artery disease     Diverticulosis     Fracture of right distal radius     GERD (gastroesophageal reflux disease)     Hypertension     Hypothyroidism (acquired)     Osteoporosis, post-menopausal     Scoliosis      Past Surgical History:   Procedure Laterality Date    ADENOIDECTOMY      CATARACT EXTRACTION      ou     SECTION      CHOLECYSTECTOMY      EYE SURGERY  2014    ptosis surgery      HYSTERECTOMY      yag cap      od     Family History   Problem Relation Name Age of Onset    Cancer Mother          skin cancer     Dementia Mother      Hypertension Mother      Heart disease Brother      Cancer Brother          lung cancer     No Known Problems Father      No Known Problems Sister      No Known Problems Maternal Aunt      No Known Problems Maternal Uncle      No Known Problems Paternal Aunt      No Known Problems Paternal Uncle      No Known Problems Maternal Grandmother      No Known Problems Maternal Grandfather      No Known Problems Paternal Grandmother      No Known Problems Paternal Grandfather      Amblyopia Neg Hx      Blindness Neg Hx      Cataracts Neg Hx      Diabetes Neg Hx      Glaucoma Neg Hx      Macular degeneration Neg Hx      Retinal detachment Neg Hx      Strabismus Neg Hx      Stroke Neg Hx      Thyroid disease Neg Hx      Melanoma Neg Hx       Social History     Tobacco Use    Smoking status: Never    Smokeless tobacco: Never   Substance Use Topics    Alcohol use: Yes     Alcohol/week: 6.0 standard drinks of alcohol     Types: 6 Glasses of wine per week     Comment: occasional /social    Drug use: No     Review of Systems   Constitutional:  Negative for fever.   HENT:  Negative for sore throat.    Eyes:  Negative for visual disturbance.   Respiratory:  Negative for shortness of breath.    Cardiovascular:   Negative for chest pain.   Gastrointestinal:  Negative for nausea.   Genitourinary:  Negative for dysuria.   Musculoskeletal:  Negative for back pain.   Skin:  Negative for rash.        (+) area of bruising and redness to the bilateral lower legs   Neurological:  Negative for weakness.       Physical Exam     Initial Vitals [05/02/24 0812]   BP Pulse Resp Temp SpO2   (!) 187/79 68 18 98.3 °F (36.8 °C) 95 %      MAP       --         Physical Exam    Nursing note and vitals reviewed.  Constitutional: She is not diaphoretic. No distress.   HENT:   Head: Normocephalic and atraumatic.   Protecting airway   Eyes: Conjunctivae and EOM are normal. No scleral icterus.   Neck: Neck supple. No tracheal deviation present.   Normal range of motion.  Cardiovascular:  Normal rate, regular rhythm and intact distal pulses.           Pulmonary/Chest: No stridor. No respiratory distress.   Speaking in full sentences   Abdominal: Abdomen is soft. She exhibits no distension. There is no abdominal tenderness.   Musculoskeletal:         General: No tenderness or edema.      Cervical back: Normal range of motion and neck supple.     Neurological: She is alert. She has normal strength. No cranial nerve deficit or sensory deficit.   Skin: Skin is warm and dry.   There is a 2 cm area of ecchymosis to the right anterior tibia. There is also faint ecchymosis to the left anterior tibia. Mild erythema to the bilateral lower extremities.    Psychiatric: She has a normal mood and affect.         ED Course   Procedures  Labs Reviewed   CBC W/ AUTO DIFFERENTIAL - Abnormal; Notable for the following components:       Result Value    MPV 9.1 (*)     Lymph # 0.6 (*)     Gran % 83.4 (*)     Lymph % 10.6 (*)     All other components within normal limits   COMPREHENSIVE METABOLIC PANEL   TROPONIN I   B-TYPE NATRIURETIC PEPTIDE   APTT   MAGNESIUM     EKG Readings: (Independently Interpreted)   Initial Reading: No STEMI. Rhythm: Normal Sinus Rhythm. Heart  Rate: 63. Ectopy: No Ectopy. Conduction: LBBB. ST Segments: Normal ST Segments. T Waves: Normal. Axis: Left Axis Deviation.       Imaging Results              US Lower Extremity Arteries Bilateral (Final result)  Result time 05/02/24 11:54:05      Final result by Lucho Chavez MD (05/02/24 11:54:05)                   Impression:      1. No definite focal hemodynamically significant arterial stenosis in the lower extremity.  2. Biphasic waveform morphology is in both lower extremities, as may be seen in the setting of proximal steno-occlusive disease and/or underlying peripheral arterial disease.  Clinical correlation recommended.  3. Additional details, as provided in the body of the report.      Electronically signed by: Lucho Chavez  Date:    05/02/2024  Time:    11:54               Narrative:    EXAMINATION:  US LOWER EXTREMITY ARTERIES BILATERAL    CLINICAL HISTORY:  Pain in right leg    TECHNIQUE:  Bilateral spectral, color and grayscale images of the large arteries of both lower extremities were performed.    COMPARISON:  None    FINDINGS:  Peak systolic velocities were obtained as follows (in cm/sec):    Right common femoral:  101.2, triphasic waveform    Right deep femoral:  47.4, biphasic waveform    Right superficial femoral proximal: 101.6, biphasic waveform    Right superficial femoral mid: 74.7, biphasic waveform    Right superficial femoral distal: 83.2, biphasic waveform    Right proximal popliteal:  64.7, biphasic waveform    Right distal popliteal:  67.1, biphasic waveform    Right anterior tibial:  62.9, biphasic waveform    Right peroneal:  54.9, biphasic waveform    Right posterior tibial:  77.8, biphasic waveform    Right dorsalis pedis: 34.8, biphasic waveform    Left common femoral:  118.7, biphasic waveform    Left deep femoral:  73.9, biphasic waveform    Left superficial femoral proximal: 94.3, biphasic waveform    Left superficial femoral mid: 95.6, biphasic waveform    Left  superficial femoral distal: 74.4, biphasic waveform    Left proximal popliteal: 54.6, biphasic waveform    Left distal popliteal: 63.3, biphasic waveform    Left anterior tibial:  65.1, biphasic waveform    Left peroneal:   32.9, biphasic waveform    Left posterior tibial: 84.3, biphasic waveform    Left dorsalis pedis: 37.5, biphasic waveform                                       US Lower Extremity Veins Bilateral (Final result)  Result time 05/02/24 11:44:08      Final result by Lucho Chavez MD (05/02/24 11:44:08)                   Impression:      Positive examination.    Thrombus noted within the left femoral and popliteal veins.    Otherwise negative examination.      Electronically signed by: Lucho Chavez  Date:    05/02/2024  Time:    11:44               Narrative:    EXAMINATION:  US LOWER EXTREMITY VEINS BILATERAL    CLINICAL HISTORY:  Personal history of other venous thrombosis and embolism    TECHNIQUE:  Duplex and color flow Doppler and dynamic compression was performed of the bilateral lower extremity veins was performed.    COMPARISON:  None    FINDINGS:  Right thigh veins: The common femoral, femoral, popliteal, upper greater saphenous, and deep femoral veins are patent and free of thrombus. The veins are normally compressible and have normal phasic flow and augmentation response.    Right calf veins: The visualized calf veins are patent.    Left thigh veins: Thrombus noted within the femoral and popliteal veins.  Common femoral and greater saphenous veins appear patent and compressible.    Left calf veins: The visualized calf veins are patent.    Miscellaneous: None                                       Medications - No data to display  Medical Decision Making  Differential diagnosis includes cellulitis, DVT, venous insufficiency, and peripheral arterial disease.     Per chart review, patient was seen virtually on 4/27/24 for a wound to the right lower extremity and was prescribed Bactroban  2% ointment for treatment. She followed up with her PCP on 4/29/24  and was placed on a 7 day course of Keflex 500 mg for suspected delayed wound healing/possible early infection.     Patient is afebrile and in no acute distress at time history and physical.  She has a GCS of 15.  She is symmetrical bilateral lower extremities.  There is no calf tenderness.  CBC without leukocytosis.  There are 83% granulocytes.  There is no bandemia.  Chemistry without renal failure or electrolyte abnormality.  BNP, troponin within normal ranges.  Shows no definite significant arterial stenosis in bilateral lower extremities of peripheral arterial disease.  Patient does not appear to have critical ischemia.  Notes DVT in the left femoral and popliteal veins.  There is no DVT in the right lower extremity.  Ultrasound of the left lower extremity from approximately 1 year ago had more significant and extensive DVT.  Patient's left lower extremity DVT appears to be improved.  Symptoms unlikely to represent failure of Eliquis management.    On reassessment patient is resting comfortably in stretcher.  She denies complaint other than discomfort from the hospital bed.  Patient requests discharge.  Patient noted to be hypertensive.  Lab tests in the emergency department do not suggest end-organ damage.  Unclear if hypertension is related to discomfort from hospital bed or to white coat syndrome or to inadequate compliance with antihypertensives.  Patient does not appear to require emergent blood pressure lowering.  She is clinically stable in the emergency department and fit for discharge to complete course antibiotics prescribed, comply with her antihypertensives, anticoagulants and follow up with her primary care physician.  Patient and family member at the bedside and grandson via phone counseled on supportive care, appropriate medication usage, concerning symptoms for which to return to ER and the importance of follow up. Understanding  and agreement with treatment plan was expressed.     Amount and/or Complexity of Data Reviewed  Independent Historian:      Details: Additional patient history provided by independent historian: pt's daughter.  Labs: ordered.  Radiology: ordered.  ECG/medicine tests: ordered and independent interpretation performed.            Scribe Attestation:   Scribe #1: I performed the above scribed service and the documentation accurately describes the services I performed. I attest to the accuracy of the note.              This chart was completed using dictation software, as a result there may be some transcription errors.            I, Jeremi Lai , personally performed the services described in this documentation. All medical record entries made by the scribe were at my direction and in my presence. I have reviewed the chart and agree that the record reflects my personal performance and is accurate and complete.       Clinical Impression:  Final diagnoses:  [W19.XXXA] Fall  [Z86.718] History of DVT (deep vein thrombosis)  [M79.604, M79.605] Bilateral lower extremity pain          ED Disposition Condition    Discharge Stable          ED Prescriptions    None       Follow-up Information       Follow up With Specialties Details Why Contact Info    Ivette Mcdaniel MD Internal Medicine Schedule an appointment as soon as possible for a visit   [address]  [place] [address]  [phone]               Jeremi Lai MD  05/02/24 1429       Jeremi Lai MD  05/02/24 1428

## 2024-05-05 ENCOUNTER — PATIENT MESSAGE (OUTPATIENT)
Dept: CARDIOLOGY | Facility: CLINIC | Age: 89
End: 2024-05-05
Payer: MEDICARE

## 2024-05-09 ENCOUNTER — OFFICE VISIT (OUTPATIENT)
Dept: CARDIOLOGY | Facility: CLINIC | Age: 89
End: 2024-05-09
Payer: MEDICARE

## 2024-05-09 VITALS
SYSTOLIC BLOOD PRESSURE: 118 MMHG | DIASTOLIC BLOOD PRESSURE: 58 MMHG | BODY MASS INDEX: 23.82 KG/M2 | WEIGHT: 110.44 LBS | HEIGHT: 57 IN | OXYGEN SATURATION: 97 % | HEART RATE: 87 BPM

## 2024-05-09 DIAGNOSIS — I48.11 LONGSTANDING PERSISTENT ATRIAL FIBRILLATION: ICD-10-CM

## 2024-05-09 DIAGNOSIS — I10 BENIGN HYPERTENSION: ICD-10-CM

## 2024-05-09 DIAGNOSIS — E78.5 DYSLIPIDEMIA: ICD-10-CM

## 2024-05-09 DIAGNOSIS — R55 NEAR SYNCOPE: ICD-10-CM

## 2024-05-09 DIAGNOSIS — I25.10 CORONARY ARTERY DISEASE INVOLVING NATIVE CORONARY ARTERY OF NATIVE HEART WITHOUT ANGINA PECTORIS: ICD-10-CM

## 2024-05-09 DIAGNOSIS — I82.531 CHRONIC EMBOLISM AND THROMBOSIS OF RIGHT POPLITEAL VEIN: Primary | ICD-10-CM

## 2024-05-09 DIAGNOSIS — I73.9 PERIPHERAL VASCULAR DISEASE, UNSPECIFIED: ICD-10-CM

## 2024-05-09 DIAGNOSIS — E78.00 PURE HYPERCHOLESTEROLEMIA: ICD-10-CM

## 2024-05-09 PROCEDURE — 1159F MED LIST DOCD IN RCRD: CPT | Mod: CPTII,S$GLB,, | Performed by: INTERNAL MEDICINE

## 2024-05-09 PROCEDURE — 99214 OFFICE O/P EST MOD 30 MIN: CPT | Mod: S$GLB,,, | Performed by: INTERNAL MEDICINE

## 2024-05-09 PROCEDURE — 1157F ADVNC CARE PLAN IN RCRD: CPT | Mod: CPTII,S$GLB,, | Performed by: INTERNAL MEDICINE

## 2024-05-09 PROCEDURE — 3288F FALL RISK ASSESSMENT DOCD: CPT | Mod: CPTII,S$GLB,, | Performed by: INTERNAL MEDICINE

## 2024-05-09 PROCEDURE — 1126F AMNT PAIN NOTED NONE PRSNT: CPT | Mod: CPTII,S$GLB,, | Performed by: INTERNAL MEDICINE

## 2024-05-09 PROCEDURE — 99999 PR PBB SHADOW E&M-EST. PATIENT-LVL III: CPT | Mod: PBBFAC,HCNC,, | Performed by: INTERNAL MEDICINE

## 2024-05-09 PROCEDURE — 1100F PTFALLS ASSESS-DOCD GE2>/YR: CPT | Mod: CPTII,S$GLB,, | Performed by: INTERNAL MEDICINE

## 2024-05-09 NOTE — PROGRESS NOTES
Subjective   Patient ID:  Nany Crenshaw is a 89 y.o. female who presents for follow-up of No chief complaint on file.      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  She's had some inflammation and prominence of her varicosities in the right leg.  She's mainly worried about recurrence of clot of blood thinners.  She denies any worsening cardiopulmonary complaints.  She's not had any chest pain, shortness of breath or palpitations.  She denies any PND, orthopnea or lower edema.  She is not expressing dizziness, presyncope or syncope.     Here for follow-up of DVT and coronary artery disease.  She still has some prominence in varicosities in her legs.  She says at times it gets red around her right ankle but does not cause her pain.  She tolerated her blood thinner as had no problems.  She recently went to [facility] without any issues on the airplane.  Do a trip to [place] planned as well.  She denies any PND, orthopnea or lower extremity edema otherwise.  She's not expressing dizziness, presyncope or syncope.     -cont med tx  -cont to follow sx mainly  -Diet and exercise tolerated  -On Eliquis for OAC indefinitely for recurrent DVT     LE venous US 6/1/22  Thrombosis is seen within the left iliac, common femoral, femoral, popliteal, and peroneal veins.  No evidence of clot involving the right common femoral vein or left greater saphenous and anterior and posterior tibial veins.     lower extremity ultrasound:   11-17  Nonocclusive thrombus in the right femoral vein and right popliteal vein.     Echo 5/24/21  The left ventricle is normal in size with mild concentric hypertrophy and normal systolic function.  The estimated ejection fraction is 65%.  Normal right ventricular size with normal right ventricular systolic function.  Mild aortic regurgitation.  Mild tricuspid regurgitation.  The estimated PA  systolic pressure is 33 mmHg.     Event monitor 5/18/21  Negative event monitor with no clinical arrhythmias.  Symptoms corresponding with normal sinus rhythm.            Carotid ultrasound:  5-18  CONCLUSIONS   There is 20 - 39% right Internal Carotid stenosis.  There is 20 - 39% left Internal Carotid stenosis.     Lower extremity arterial ultrasound:  Impression:  1-  no hemodynamically significant plaque bilaterally     Lower extremity venous ultrasound:  TEST DESCRIPTION   Impression:    RIGHT:  Chronic occlusive DVT of the Femoral Vein.    LEFT:  No evidence of Left lower extremity DVT.       1/6/20 Daughter provided translation  Denies CP or SOB  Walks with a walker at home  EKG NSR LBBB - no change     Went to the ER 4/4/21  This is a 89-year-old  female presents to the emergency room with her grandson who is a 1st year resident at [employer] studying Internal Medicine.  The patient was taking off her slippers and she slipped and fell striking the left side of her thorax.  The patient complains of pain in the left lateral thorax and the left lateral thoracic back to midline spine is spared along its entire course.  There is no shortness of breath or precordial chest discomfort the patient has no abdominal complaints.  She is essentially otherwise well without other injuries or problems she has no extremity injuries.  There is no known head trauma.  The patient notes no abrasions contusions or tender areas on her scalp.  She has no neck pain.  She has no neurologic deficits.  There is no cough or difficulty breathing.        5/14/21 Reports several episodes of weakness with near syncope over the last few weeks  EKG NSR LBBB - no change. Denies CP or SOB   Echo and event monitor for near syncopal spells  Needs to go to the ER if symptoms worsen     6/21/21 Denies CP or SOB. Still with dizzy spells - denies LOC or falls   Event monitor negative for arrhythmias during dizzy spells  Echo with good EF  Continue to  observe - could decrease lisinopril or metoprolol dose if dizzy spells persist  Decrease eliquis 2.5 bid  Continue Rx for HTN, HLD, DVT, CAD      10/1/21 Denies CP, SOB, or dizziness. Mild ankle edema  EKG NSR LBBB  PRN lasix for LE edema    Continue Rx for HTN, HLD, DVT, CAD   OV 6 months     Went to the ER 6/2/22  HPI: This is a 89 y.o.female patient, with a PMHx of DVT, CAD and HTN , presenting to the ED for follow up from her ED visit yesterday regarding swelling of the left leg. Patient's daughter reports patient was here yesterday and diagnosed with DVT to her left leg. Daughter reports patient was told to come back this morning for her 2nd Lovenox injection and to be set up with home health. Daughter reports the patient's leg swelling has decreased and has gotten better since the administration of the first injection last night in the ED. Patient denies any other complications. Patient denies any history of blood clots in the lungs. Patient denies any shortness of breath, chest pain, vomiting, or any other associated symptoms. No known drug allergies.     6/22/22 Went back to eliquis after taking lovenox for 2 weeks. LLE still with mild erythema and swelling. Denies CP or SOB  EKG NSR LBBB   Recurrent DVT on eliquis  Stop eliquis  Start coumadin 5 mg qd  Refer to coumadin clinic  Bridge with lovenox 60 mg bid until INR  > 2  OV 1 month  Continue Rx for HTN, HLD, CAD      8/4/22 INR in range on coumadin. Having some chronic skin changes in LE bilaterally  Denies CP or SOB   Recommended hydrating lotion for chronic skin changes  Continue Rx for HTN, HLD, CAD, DVT  OV 6 months     3/10/23 Here for bilateral foot swelling. Denies CP or SOB  Would like to change coumadin back to eliquis  Occasional dizziness  EKG NSR LBBB  Lasix prn for LE edema  Change coumadin to eliquis 2.5 bid  Continue Rx for HTN, HLD, CAD, DVT  OV 6 month     11/17/23 Denies CP or SOB. Takes an extra lasix if her ankles swell  BP  controlled  Planning a trip to [facility]  Continue Rx for HTN, HLD, CAD, DVT  OV 6 month    Went to the ER 5/2/24  This 89 y.o female, with a medical history of Coronary artery disease, Hypertension, and DVT (bilateral), presents to the ED accompanied by her daughter c/o bruising and redness to the bilateral lower legs. Daughter reports that pt began to experience bruising to the right lower leg s/p a fall on 4/19/24. No loss of consciousness or injuries. She states that pt later began to c/o a burning sensation to the area and notes that the bruise began to increase in size and was surrounded by redness. Daughter reports that pt visited her PCP last week (4/29/24) for the symptoms and was placed on 500 mg Keflex for treatment. Daughter states, however, that she became concerned as this morning pt's bilateral lower legs were hot to the touch (up to the knees). She notes that the redness also appears to be moving up the right leg and adds that pt now also has an area of bruising to the left lower leg as well prompting her to bring pt into the ED today. Of note, pt is currently on Eliquis, which daughter believes she is compliant with. Pt denies any pain at this time. She also denies fever, chest pain, shortness of breath or any other associated symptoms.   BNP 27, troponin negative  EKG 5/2/24 NSR LBBB    5/9/24 Denies CP or SOB, occasional dizziness and falls. BP ok  Still with ulcer on RLE - improving on Abx per family    Review of Systems   Constitutional: Negative for decreased appetite.   HENT:  Negative for ear discharge.    Eyes:  Negative for blurred vision.   Respiratory:  Negative for hemoptysis.    Endocrine: Negative for polyphagia.   Hematologic/Lymphatic: Negative for adenopathy.   Skin:  Negative for color change.   Musculoskeletal:  Negative for joint swelling.   Genitourinary:  Negative for bladder incontinence.   Neurological:  Negative for brief paralysis.   Psychiatric/Behavioral:  Negative for  hallucinations.    Allergic/Immunologic: Negative for hives.          Objective     Physical Exam  Constitutional:       Appearance: She is well-developed.   HENT:      Head: Normocephalic and atraumatic.   Eyes:      Conjunctiva/sclera: Conjunctivae normal.      Pupils: Pupils are equal, round, and reactive to light.   Neck:      Thyroid: No thyromegaly.   Cardiovascular:      Rate and Rhythm: Normal rate and regular rhythm.      Heart sounds: Murmur heard.      Early systolic murmur is present with a grade of 2/6.   Pulmonary:      Effort: Pulmonary effort is normal. No respiratory distress.      Breath sounds: Normal breath sounds.   Abdominal:      General: Bowel sounds are normal.      Palpations: Abdomen is soft.   Musculoskeletal:         General: Normal range of motion.      Cervical back: Normal range of motion and neck supple.      Comments: Prominent varicosities with some inflammation right greater than left   Skin:     General: Skin is warm and dry.   Neurological:      Mental Status: She is alert and oriented to person, place, and time.   Psychiatric:         Behavior: Behavior normal.            Assessment and Plan     1. 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 Benign hypertension    3. Coronary artery disease involving native coronary artery of native heart without angina pectoris    4. Longstanding persistent atrial fibrillation    5. Dyslipidemia    6. Peripheral vascular disease, unspecified    7. Pure hypercholesterolemia    8. Near syncope        Plan:     Echo for dizziness and frequent falls and new systolic murmur  Continue Rx for cellulitis  Continue Rx for HTN, HLD, CAD, DVT    Advance Care Planning     Date: 05/09/2024  Patient did not wish or was not able to name a surrogate decision maker or provide an Advance Care Plan.

## 2024-05-10 ENCOUNTER — LAB VISIT (OUTPATIENT)
Dept: LAB | Facility: HOSPITAL | Age: 89
End: 2024-05-10
Attending: INTERNAL MEDICINE
Payer: MEDICARE

## 2024-05-10 ENCOUNTER — OFFICE VISIT (OUTPATIENT)
Dept: INTERNAL MEDICINE | Facility: CLINIC | Age: 89
End: 2024-05-10
Payer: MEDICARE

## 2024-05-10 DIAGNOSIS — I82.409 DEEP VEIN THROMBOSIS (DVT) OF LOWER EXTREMITY, UNSPECIFIED CHRONICITY, UNSPECIFIED LATERALITY, UNSPECIFIED VEIN: ICD-10-CM

## 2024-05-10 DIAGNOSIS — R42 DIZZINESS: ICD-10-CM

## 2024-05-10 DIAGNOSIS — E03.9 HYPOTHYROIDISM (ACQUIRED): ICD-10-CM

## 2024-05-10 DIAGNOSIS — Z79.01 LONG TERM (CURRENT) USE OF ANTICOAGULANTS: ICD-10-CM

## 2024-05-10 DIAGNOSIS — R41.3 MEMORY LOSS: ICD-10-CM

## 2024-05-10 DIAGNOSIS — Z00.00 ANNUAL PHYSICAL EXAM: ICD-10-CM

## 2024-05-10 DIAGNOSIS — I25.10 CORONARY ARTERY DISEASE INVOLVING NATIVE CORONARY ARTERY OF NATIVE HEART WITHOUT ANGINA PECTORIS: ICD-10-CM

## 2024-05-10 DIAGNOSIS — Z00.00 ANNUAL PHYSICAL EXAM: Primary | ICD-10-CM

## 2024-05-10 DIAGNOSIS — I10 BENIGN HYPERTENSION: ICD-10-CM

## 2024-05-10 LAB
CHOLEST SERPL-MCNC: 208 MG/DL (ref 120–199)
CHOLEST/HDLC SERPL: 2.4 {RATIO} (ref 2–5)
HDLC SERPL-MCNC: 87 MG/DL (ref 40–75)
HDLC SERPL: 41.8 % (ref 20–50)
LDLC SERPL CALC-MCNC: 96.4 MG/DL (ref 63–159)
NONHDLC SERPL-MCNC: 121 MG/DL
T4 FREE SERPL-MCNC: 0.72 NG/DL (ref 0.71–1.51)
TRIGL SERPL-MCNC: 123 MG/DL (ref 30–150)
TSH SERPL DL<=0.005 MIU/L-ACNC: 7.95 UIU/ML (ref 0.4–4)

## 2024-05-10 PROCEDURE — 3288F FALL RISK ASSESSMENT DOCD: CPT | Mod: CPTII,S$GLB,, | Performed by: INTERNAL MEDICINE

## 2024-05-10 PROCEDURE — 84443 ASSAY THYROID STIM HORMONE: CPT | Performed by: INTERNAL MEDICINE

## 2024-05-10 PROCEDURE — 36415 COLL VENOUS BLD VENIPUNCTURE: CPT | Performed by: INTERNAL MEDICINE

## 2024-05-10 PROCEDURE — 1101F PT FALLS ASSESS-DOCD LE1/YR: CPT | Mod: CPTII,S$GLB,, | Performed by: INTERNAL MEDICINE

## 2024-05-10 PROCEDURE — 84439 ASSAY OF FREE THYROXINE: CPT | Performed by: INTERNAL MEDICINE

## 2024-05-10 PROCEDURE — 1159F MED LIST DOCD IN RCRD: CPT | Mod: CPTII,S$GLB,, | Performed by: INTERNAL MEDICINE

## 2024-05-10 PROCEDURE — 1160F RVW MEDS BY RX/DR IN RCRD: CPT | Mod: CPTII,S$GLB,, | Performed by: INTERNAL MEDICINE

## 2024-05-10 PROCEDURE — 99397 PER PM REEVAL EST PAT 65+ YR: CPT | Mod: S$GLB,,, | Performed by: INTERNAL MEDICINE

## 2024-05-10 PROCEDURE — 80061 LIPID PANEL: CPT | Performed by: INTERNAL MEDICINE

## 2024-05-10 PROCEDURE — 1157F ADVNC CARE PLAN IN RCRD: CPT | Mod: CPTII,S$GLB,, | Performed by: INTERNAL MEDICINE

## 2024-05-10 PROCEDURE — 99999 PR PBB SHADOW E&M-EST. PATIENT-LVL III: CPT | Mod: PBBFAC,,, | Performed by: INTERNAL MEDICINE

## 2024-05-10 NOTE — PROGRESS NOTES
"Subjective:       Patient ID: Nany Crenshaw is a 89 y.o. female.    Chief Complaint: Annual Exam  This is a 89-year-old who presents today for physical.  She is brought in by her family and reports that she has been declining a bit her memory seems to be worse she remains on her medicines and did recently have a cardiology follow-up she continues to eat well she has some issues with dizziness that she complains about had 1 fall since last visit and did injure her right shin she has a wound that seems to be healing with no drainage or redness just too painful scab.  She continues on her Eliquis for her history of DVT and also her blood pressure medicines.    HPI  Review of Systems   Constitutional:         Had one fall    Respiratory:  Negative for shortness of breath.    Cardiovascular:  Positive for leg swelling. Negative for chest pain.   Skin:         Scab shin   No erythema some discomfort    Neurological:  Positive for dizziness. Negative for headaches.   Psychiatric/Behavioral:          Memory decline  Has family next door        Objective:    Blood pressure 118/60, pulse 78, height 4' 9" (1.448 m), weight 50.1 kg (110 lb 7.2 oz), SpO2 96%.   Physical Exam  Constitutional:       General: She is not in acute distress.     Comments: Sitting in wheelchair    HENT:      Head: Normocephalic.      Mouth/Throat:      Pharynx: Oropharynx is clear.   Eyes:      General: No scleral icterus.  Cardiovascular:      Rate and Rhythm: Normal rate and regular rhythm.      Heart sounds: Normal heart sounds. No murmur heard.     No friction rub. No gallop.      Comments: Breast : normal no masses or tenderness      Pulmonary:      Effort: Pulmonary effort is normal. No respiratory distress.      Breath sounds: Normal breath sounds.   Abdominal:      General: Bowel sounds are normal.      Palpations: Abdomen is soft. There is no mass.      Tenderness: There is no abdominal tenderness.   Musculoskeletal:      Cervical back: Neck " supple.   Skin:     Findings: No erythema.      Comments: Scab right shin no erythema  Venous insufficiencyh   Neurological:      Mental Status: She is alert.   Psychiatric:         Mood and Affect: Mood normal.         Assessment:       1. Annual physical exam    2. Hypothyroidism (acquired)    3. Coronary artery disease involving native coronary artery of native heart without angina pectoris    4. Dizziness    5. Long term (current) use of anticoagulants    6. Benign hypertension    7. Deep vein thrombosis (DVT) of lower extremity, unspecified chronicity, unspecified laterality, unspecified vein    8. Memory loss        Plan:       Nany was seen today for annual exam.    Diagnoses and all orders for this visit:    Annual physical exam  -     TSH; Future  -     T4, Free; Future  -     Lipid Panel; Future    Hypothyroidism (acquired)  Update labs and adjust if needed   -     TSH; Future  -     T4, Free; Future  -     Lipid Panel; Future    Coronary artery disease involving native coronary artery of native heart without angina pectoris  Continues cardilogy follow up     Dizziness  Continued fall precuations      Benign hypertension  Recent reduction continue to monitor     Deep vein thrombosis (DVT) of lower extremity, unspecified chronicity, unspecified laterality, unspecified vein  Long term use of anticoagulation remaind on eliquis  Following with cardiology    Memory loss  Has family support     For wound continue to monitor no sign of infection  Consider wound consult if persist     Labs and review

## 2024-05-12 VITALS
SYSTOLIC BLOOD PRESSURE: 118 MMHG | DIASTOLIC BLOOD PRESSURE: 60 MMHG | HEIGHT: 57 IN | HEART RATE: 78 BPM | BODY MASS INDEX: 23.82 KG/M2 | WEIGHT: 110.44 LBS | OXYGEN SATURATION: 96 %

## 2024-05-13 DIAGNOSIS — E03.9 HYPOTHYROIDISM, UNSPECIFIED TYPE: Primary | ICD-10-CM

## 2024-05-24 ENCOUNTER — HOSPITAL ENCOUNTER (OUTPATIENT)
Dept: CARDIOLOGY | Facility: HOSPITAL | Age: 89
Discharge: HOME | End: 2024-05-24
Attending: INTERNAL MEDICINE
Payer: MEDICARE

## 2024-05-24 DIAGNOSIS — I25.10 CORONARY ARTERY DISEASE INVOLVING NATIVE CORONARY ARTERY OF NATIVE HEART WITHOUT ANGINA PECTORIS: ICD-10-CM

## 2024-05-24 DIAGNOSIS — I10 BENIGN HYPERTENSION: ICD-10-CM

## 2024-05-24 DIAGNOSIS — E78.00 PURE HYPERCHOLESTEROLEMIA: ICD-10-CM

## 2024-05-24 DIAGNOSIS — R55 NEAR SYNCOPE: ICD-10-CM

## 2024-05-24 DIAGNOSIS — I82.531 CHRONIC EMBOLISM AND THROMBOSIS OF RIGHT POPLITEAL VEIN: ICD-10-CM

## 2024-05-24 DIAGNOSIS — I73.9 PERIPHERAL VASCULAR DISEASE, UNSPECIFIED: ICD-10-CM

## 2024-05-24 DIAGNOSIS — I48.11 LONGSTANDING PERSISTENT ATRIAL FIBRILLATION: ICD-10-CM

## 2024-05-24 DIAGNOSIS — E78.5 DYSLIPIDEMIA: ICD-10-CM

## 2024-05-24 LAB
ASCENDING AORTA: 2.1 CM
AV INDEX (PROSTH): 0.67
AV MEAN GRADIENT: 5 MMHG
AV PEAK GRADIENT: 9 MMHG
AV REGURGITATION PRESSURE HALF TIME: 582.87 MS
AV VALVE AREA BY VELOCITY RATIO: 1.69 CM²
AV VALVE AREA: 1.79 CM²
AV VELOCITY RATIO: 0.63
CV ECHO LV RWT: 0.82 CM
DOP CALC AO PEAK VEL: 1.48 M/S
DOP CALC AO VTI: 30.7 CM
DOP CALC LVOT AREA: 2.7 CM2
DOP CALC LVOT DIAMETER: 1.85 CM
DOP CALC LVOT PEAK VEL: 0.93 M/S
DOP CALC LVOT STROKE VOLUME: 55.08 CM3
DOP CALC MV VTI: 37.8 CM
DOP CALCLVOT PEAK VEL VTI: 20.5 CM
E WAVE DECELERATION TIME: 189.05 MSEC
E/A RATIO: 0.48
E/E' RATIO: 22.57 M/S
ECHO LV POSTERIOR WALL: 1.1 CM (ref 0.6–1.1)
FRACTIONAL SHORTENING: 36 % (ref 28–44)
INTERVENTRICULAR SEPTUM: 1.03 CM (ref 0.6–1.1)
IVC DIAMETER: 1 CM
IVRT: 114.18 MSEC
LA MAJOR: 3.84 CM
LA MINOR: 3.49 CM
LA WIDTH: 3.6 CM
LEFT ATRIUM SIZE: 2.27 CM
LEFT ATRIUM VOLUME: 25.4 CM3
LEFT INTERNAL DIMENSION IN SYSTOLE: 1.72 CM (ref 2.1–4)
LEFT VENTRICLE DIASTOLIC VOLUME: 26.37 ML
LEFT VENTRICLE SYSTOLIC VOLUME: 8.67 ML
LEFT VENTRICULAR INTERNAL DIMENSION IN DIASTOLE: 2.67 CM (ref 3.5–6)
LEFT VENTRICULAR MASS: 76.78 G
LV LATERAL E/E' RATIO: 19.75 M/S
LV SEPTAL E/E' RATIO: 26.33 M/S
LVOT MG: 2.58 MMHG
LVOT MV: 0.79 CM/S
MV MEAN GRADIENT: 5 MMHG
MV PEAK A VEL: 1.63 M/S
MV PEAK E VEL: 0.79 M/S
MV PEAK GRADIENT: 14 MMHG
MV STENOSIS PRESSURE HALF TIME: 54.82 MS
MV VALVE AREA BY CONTINUITY EQUATION: 1.46 CM2
MV VALVE AREA P 1/2 METHOD: 4.01 CM2
OHS CV RV/LV RATIO: 1.23 CM
PISA AR MAX VEL: 3.45 M/S
PISA TR MAX VEL: 2.3 M/S
PULM VEIN S/D RATIO: 1.37
PV PEAK D VEL: 0.41 M/S
PV PEAK GRADIENT: 5 MMHG
PV PEAK S VEL: 0.56 M/S
PV PEAK VELOCITY: 1.14 M/S
RA MAJOR: 3.55 CM
RA PRESSURE ESTIMATED: 3 MMHG
RA WIDTH: 2.3 CM
RIGHT VENTRICULAR END-DIASTOLIC DIMENSION: 3.28 CM
RV TB RVSP: 5 MMHG
RV TISSUE DOPPLER FREE WALL SYSTOLIC VELOCITY 1 (APICAL 4 CHAMBER VIEW): 10.32 CM/S
SINUS: 2.75 CM
STJ: 1.79 CM
TDI LATERAL: 0.04 M/S
TDI SEPTAL: 0.03 M/S
TDI: 0.04 M/S
TR MAX PG: 21 MMHG
TRICUSPID ANNULAR PLANE SYSTOLIC EXCURSION: 1.56 CM
TV REST PULMONARY ARTERY PRESSURE: 24 MMHG

## 2024-05-24 PROCEDURE — 93306 TTE W/DOPPLER COMPLETE: CPT | Mod: HCNC

## 2024-05-24 PROCEDURE — 93306 TTE W/DOPPLER COMPLETE: CPT | Mod: 26,HCNC,, | Performed by: INTERNAL MEDICINE

## 2024-06-04 ENCOUNTER — PATIENT MESSAGE (OUTPATIENT)
Dept: INTERNAL MEDICINE | Facility: CLINIC | Age: 89
End: 2024-06-04
Payer: MEDICARE

## 2024-06-04 RX ORDER — DOXYCYCLINE HYCLATE 100 MG
100 TABLET ORAL 2 TIMES DAILY
Qty: 14 TABLET | Refills: 0 | Status: SHIPPED | OUTPATIENT
Start: 2024-06-04

## 2024-06-04 NOTE — TELEPHONE ENCOUNTER
Spoke with familyi   Discussed trial doxycycline  Due to poor healing continued discomfort  Call if no resolution or if wound clinic needed discussed

## 2024-06-10 ENCOUNTER — PATIENT MESSAGE (OUTPATIENT)
Dept: INTERNAL MEDICINE | Facility: CLINIC | Age: 89
End: 2024-06-10
Payer: MEDICARE

## 2024-06-21 ENCOUNTER — OFFICE VISIT (OUTPATIENT)
Dept: CARDIOLOGY | Facility: CLINIC | Age: 89
End: 2024-06-21
Payer: MEDICARE

## 2024-06-21 VITALS
WEIGHT: 111 LBS | BODY MASS INDEX: 23.95 KG/M2 | OXYGEN SATURATION: 98 % | SYSTOLIC BLOOD PRESSURE: 116 MMHG | DIASTOLIC BLOOD PRESSURE: 64 MMHG | HEIGHT: 57 IN | HEART RATE: 63 BPM

## 2024-06-21 DIAGNOSIS — I48.11 LONGSTANDING PERSISTENT ATRIAL FIBRILLATION: ICD-10-CM

## 2024-06-21 DIAGNOSIS — I73.9 PERIPHERAL VASCULAR DISEASE, UNSPECIFIED: ICD-10-CM

## 2024-06-21 DIAGNOSIS — E78.5 DYSLIPIDEMIA: ICD-10-CM

## 2024-06-21 DIAGNOSIS — I25.10 CORONARY ARTERY DISEASE INVOLVING NATIVE CORONARY ARTERY OF NATIVE HEART WITHOUT ANGINA PECTORIS: Primary | ICD-10-CM

## 2024-06-21 DIAGNOSIS — I10 BENIGN HYPERTENSION: ICD-10-CM

## 2024-06-21 DIAGNOSIS — E78.00 PURE HYPERCHOLESTEROLEMIA: ICD-10-CM

## 2024-06-21 PROCEDURE — 99999 PR PBB SHADOW E&M-EST. PATIENT-LVL III: CPT | Mod: PBBFAC,HCNC,, | Performed by: INTERNAL MEDICINE

## 2024-06-21 NOTE — PROGRESS NOTES
Subjective   Patient ID:  Nany Crenshaw is a 89 y.o. female who presents for follow-up of No chief complaint on file.      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  She's had some inflammation and prominence of her varicosities in the right leg.  She's mainly worried about recurrence of clot of blood thinners.  She denies any worsening cardiopulmonary complaints.  She's not had any chest pain, shortness of breath or palpitations.  She denies any PND, orthopnea or lower edema.  She is not expressing dizziness, presyncope or syncope.     Here for follow-up of DVT and coronary artery disease.  She still has some prominence in varicosities in her legs.  She says at times it gets red around her right ankle but does not cause her pain.  She tolerated her blood thinner as had no problems.  She recently went to [facility] without any issues on the airplane.  Do a trip to [place] planned as well.  She denies any PND, orthopnea or lower extremity edema otherwise.  She's not expressing dizziness, presyncope or syncope.     -cont med tx  -cont to follow sx mainly  -Diet and exercise tolerated  -On Eliquis for OAC indefinitely for recurrent DVT     LE venous US 6/1/22  Thrombosis is seen within the left iliac, common femoral, femoral, popliteal, and peroneal veins.  No evidence of clot involving the right common femoral vein or left greater saphenous and anterior and posterior tibial veins.     lower extremity ultrasound:   11-17  Nonocclusive thrombus in the right femoral vein and right popliteal vein.     Echo 5/24/24    Left Ventricle: The left ventricle is normal in size. Increased wall thickness. Moderate septal thickening. There is normal systolic function with a visually estimated ejection fraction of 60 - 65%. Grade I diastolic dysfunction.    Right Ventricle: Normal right ventricular cavity size. Systolic  function is normal.    Left Atrium: Left atrium is mildly dilated.    Aortic Valve: There is mild aortic regurgitation.    Tricuspid Valve: There is mild regurgitation.    Pulmonary Artery: The estimated pulmonary artery systolic pressure is 24 mmHg.    IVC/SVC: Normal venous pressure at 3 mmHg.     Event monitor 5/18/21  Negative event monitor with no clinical arrhythmias.  Symptoms corresponding with normal sinus rhythm.            Carotid ultrasound:  5-18  CONCLUSIONS   There is 20 - 39% right Internal Carotid stenosis.  There is 20 - 39% left Internal Carotid stenosis.     Lower extremity arterial ultrasound:  Impression:  1-  no hemodynamically significant plaque bilaterally     Lower extremity venous ultrasound:  TEST DESCRIPTION   Impression:    RIGHT:  Chronic occlusive DVT of the Femoral Vein.    LEFT:  No evidence of Left lower extremity DVT.       1/6/20 Daughter provided translation  Denies CP or SOB  Walks with a walker at home  EKG NSR LBBB - no change     Went to the ER 4/4/21  This is a 89-year-old  female presents to the emergency room with her grandson who is a 1st year resident at [employer] studying Internal Medicine.  The patient was taking off her slippers and she slipped and fell striking the left side of her thorax.  The patient complains of pain in the left lateral thorax and the left lateral thoracic back to midline spine is spared along its entire course.  There is no shortness of breath or precordial chest discomfort the patient has no abdominal complaints.  She is essentially otherwise well without other injuries or problems she has no extremity injuries.  There is no known head trauma.  The patient notes no abrasions contusions or tender areas on her scalp.  She has no neck pain.  She has no neurologic deficits.  There is no cough or difficulty breathing.        5/14/21 Reports several episodes of weakness with near syncope over the last few weeks  EKG NSR LBBB - no change. Denies CP  or SOB   Echo and event monitor for near syncopal spells  Needs to go to the ER if symptoms worsen     6/21/21 Denies CP or SOB. Still with dizzy spells - denies LOC or falls   Event monitor negative for arrhythmias during dizzy spells  Echo with good EF  Continue to observe - could decrease lisinopril or metoprolol dose if dizzy spells persist  Decrease eliquis 2.5 bid  Continue Rx for HTN, HLD, DVT, CAD      10/1/21 Denies CP, SOB, or dizziness. Mild ankle edema  EKG NSR LBBB  PRN lasix for LE edema    Continue Rx for HTN, HLD, DVT, CAD   OV 6 months     Went to the ER 6/2/22  HPI: This is a 89 y.o.female patient, with a PMHx of DVT, CAD and HTN , presenting to the ED for follow up from her ED visit yesterday regarding swelling of the left leg. Patient's daughter reports patient was here yesterday and diagnosed with DVT to her left leg. Daughter reports patient was told to come back this morning for her 2nd Lovenox injection and to be set up with home health. Daughter reports the patient's leg swelling has decreased and has gotten better since the administration of the first injection last night in the ED. Patient denies any other complications. Patient denies any history of blood clots in the lungs. Patient denies any shortness of breath, chest pain, vomiting, or any other associated symptoms. No known drug allergies.     6/22/22 Went back to eliquis after taking lovenox for 2 weeks. LLE still with mild erythema and swelling. Denies CP or SOB  EKG NSR LBBB   Recurrent DVT on eliquis  Stop eliquis  Start coumadin 5 mg qd  Refer to coumadin clinic  Bridge with lovenox 60 mg bid until INR  > 2  OV 1 month  Continue Rx for HTN, HLD, CAD      8/4/22 INR in range on coumadin. Having some chronic skin changes in LE bilaterally  Denies CP or SOB   Recommended hydrating lotion for chronic skin changes  Continue Rx for HTN, HLD, CAD, DVT  OV 6 months     3/10/23 Here for bilateral foot swelling. Denies CP or SOB  Would  like to change coumadin back to eliquis  Occasional dizziness  EKG NSR LBBB  Lasix prn for LE edema  Change coumadin to eliquis 2.5 bid  Continue Rx for HTN, HLD, CAD, DVT  OV 6 month     11/17/23 Denies CP or SOB. Takes an extra lasix if her ankles swell  BP controlled  Planning a trip to [facility]  Continue Rx for HTN, HLD, CAD, DVT  OV 6 month     Went to the ER 5/2/24  This 89 y.o female, with a medical history of Coronary artery disease, Hypertension, and DVT (bilateral), presents to the ED accompanied by her daughter c/o bruising and redness to the bilateral lower legs. Daughter reports that pt began to experience bruising to the right lower leg s/p a fall on 4/19/24. No loss of consciousness or injuries. She states that pt later began to c/o a burning sensation to the area and notes that the bruise began to increase in size and was surrounded by redness. Daughter reports that pt visited her PCP last week (4/29/24) for the symptoms and was placed on 500 mg Keflex for treatment. Daughter states, however, that she became concerned as this morning pt's bilateral lower legs were hot to the touch (up to the knees). She notes that the redness also appears to be moving up the right leg and adds that pt now also has an area of bruising to the left lower leg as well prompting her to bring pt into the ED today. Of note, pt is currently on Eliquis, which daughter believes she is compliant with. Pt denies any pain at this time. She also denies fever, chest pain, shortness of breath or any other associated symptoms.   BNP 27, troponin negative  EKG 5/2/24 NSR LBBB     5/9/24 Denies CP or SOB, occasional dizziness and falls. BP ok  Still with ulcer on RLE - improving on Abx per family  Echo for dizziness and frequent falls and new systolic murmur  Continue Rx for cellulitis  Continue Rx for HTN, HLD, CAD, DVT    6/21/24 Denies CP or SOB  BP controlled  RLE wound continues to improve  Denies dizziness or falls    Review of  Systems   Constitutional: Negative for decreased appetite.   HENT:  Negative for ear discharge.    Eyes:  Negative for blurred vision.   Respiratory:  Negative for hemoptysis.    Endocrine: Negative for polyphagia.   Hematologic/Lymphatic: Negative for adenopathy.   Skin:  Negative for color change.   Musculoskeletal:  Negative for joint swelling.   Genitourinary:  Negative for bladder incontinence.   Neurological:  Negative for brief paralysis.   Psychiatric/Behavioral:  Negative for hallucinations.    Allergic/Immunologic: Negative for hives.          Objective     Physical Exam  Constitutional:       Appearance: She is well-developed.   HENT:      Head: Normocephalic and atraumatic.   Eyes:      Conjunctiva/sclera: Conjunctivae normal.      Pupils: Pupils are equal, round, and reactive to light.   Neck:      Thyroid: No thyromegaly.   Cardiovascular:      Rate and Rhythm: Normal rate and regular rhythm.      Heart sounds: Murmur heard.      Early systolic murmur is present with a grade of 2/6.   Pulmonary:      Effort: Pulmonary effort is normal. No respiratory distress.      Breath sounds: Normal breath sounds.   Abdominal:      General: Bowel sounds are normal.      Palpations: Abdomen is soft.   Musculoskeletal:         General: Normal range of motion.      Cervical back: Normal range of motion and neck supple.      Comments: Prominent varicosities with some inflammation right greater than left   Skin:     General: Skin is warm and dry.   Neurological:      Mental Status: She is alert and oriented to person, place, and time.   Psychiatric:         Behavior: Behavior normal.            Assessment and Plan     1. Coronary artery disease involving native coronary artery of native heart without angina pectoris    2. Benign hypertension    3. Pure hypercholesterolemia    4. Peripheral vascular disease, unspecified    5. Dyslipidemia    6. Longstanding persistent atrial fibrillation        Plan:    Echo with Mild AI  and normal EF   Continue Rx for HTN, HLD, CAD, DVT  OV 6 months    Advance Care Planning     Date: 06/21/2024  Patient did not wish or was not able to name a surrogate decision maker or provide an Advance Care Plan.

## 2024-06-23 ENCOUNTER — PATIENT MESSAGE (OUTPATIENT)
Dept: INTERNAL MEDICINE | Facility: CLINIC | Age: 89
End: 2024-06-23
Payer: MEDICARE

## 2024-06-24 NOTE — TELEPHONE ENCOUNTER
Called spoke with tamiko  Plan leg elevation she does not like suportive stockings  Discussed vaseline or aquaphor consider steroid ointment if n o improvement  She declined at this time   She will watch and call with concern

## 2024-09-30 RX ORDER — APIXABAN 2.5 MG/1
2.5 TABLET, FILM COATED ORAL 2 TIMES DAILY
Qty: 180 TABLET | Refills: 3 | Status: SHIPPED | OUTPATIENT
Start: 2024-09-30

## 2024-10-30 ENCOUNTER — LAB VISIT (OUTPATIENT)
Dept: LAB | Facility: HOSPITAL | Age: 89
End: 2024-10-30
Attending: INTERNAL MEDICINE
Payer: MEDICARE

## 2024-10-30 ENCOUNTER — OFFICE VISIT (OUTPATIENT)
Dept: INTERNAL MEDICINE | Facility: CLINIC | Age: 89
End: 2024-10-30
Payer: MEDICARE

## 2024-10-30 VITALS
SYSTOLIC BLOOD PRESSURE: 126 MMHG | HEIGHT: 57 IN | WEIGHT: 109.13 LBS | HEART RATE: 72 BPM | BODY MASS INDEX: 23.54 KG/M2 | DIASTOLIC BLOOD PRESSURE: 70 MMHG | OXYGEN SATURATION: 95 %

## 2024-10-30 DIAGNOSIS — Z78.9 IMPAIRED MOBILITY AND ADLS: ICD-10-CM

## 2024-10-30 DIAGNOSIS — E03.9 HYPOTHYROIDISM (ACQUIRED): ICD-10-CM

## 2024-10-30 DIAGNOSIS — Z74.09 IMPAIRED MOBILITY AND ADLS: ICD-10-CM

## 2024-10-30 DIAGNOSIS — R41.3 MEMORY LOSS: ICD-10-CM

## 2024-10-30 DIAGNOSIS — I48.11 LONGSTANDING PERSISTENT ATRIAL FIBRILLATION: ICD-10-CM

## 2024-10-30 DIAGNOSIS — I10 BENIGN HYPERTENSION: Primary | ICD-10-CM

## 2024-10-30 DIAGNOSIS — R19.00 ABDOMINAL MASS, UNSPECIFIED ABDOMINAL LOCATION: ICD-10-CM

## 2024-10-30 DIAGNOSIS — I10 BENIGN HYPERTENSION: ICD-10-CM

## 2024-10-30 DIAGNOSIS — Z79.01 LONG TERM (CURRENT) USE OF ANTICOAGULANTS: ICD-10-CM

## 2024-10-30 DIAGNOSIS — I82.409 DEEP VEIN THROMBOSIS (DVT) OF LOWER EXTREMITY, UNSPECIFIED CHRONICITY, UNSPECIFIED LATERALITY, UNSPECIFIED VEIN: ICD-10-CM

## 2024-10-30 DIAGNOSIS — I82.531 CHRONIC EMBOLISM AND THROMBOSIS OF RIGHT POPLITEAL VEIN: ICD-10-CM

## 2024-10-30 LAB
ALBUMIN SERPL BCP-MCNC: 3.2 G/DL (ref 3.5–5.2)
ALP SERPL-CCNC: 60 U/L (ref 40–150)
ALT SERPL W/O P-5'-P-CCNC: 10 U/L (ref 10–44)
ANION GAP SERPL CALC-SCNC: 8 MMOL/L (ref 8–16)
AST SERPL-CCNC: 16 U/L (ref 10–40)
BASOPHILS # BLD AUTO: 0.05 K/UL (ref 0–0.2)
BASOPHILS NFR BLD: 0.6 % (ref 0–1.9)
BILIRUB SERPL-MCNC: 0.8 MG/DL (ref 0.1–1)
BUN SERPL-MCNC: 14 MG/DL (ref 10–30)
CALCIUM SERPL-MCNC: 9.5 MG/DL (ref 8.7–10.5)
CHLORIDE SERPL-SCNC: 104 MMOL/L (ref 95–110)
CO2 SERPL-SCNC: 27 MMOL/L (ref 23–29)
CREAT SERPL-MCNC: 0.7 MG/DL (ref 0.5–1.4)
DIFFERENTIAL METHOD BLD: ABNORMAL
EOSINOPHIL # BLD AUTO: 0 K/UL (ref 0–0.5)
EOSINOPHIL NFR BLD: 0.4 % (ref 0–8)
ERYTHROCYTE [DISTWIDTH] IN BLOOD BY AUTOMATED COUNT: 13.3 % (ref 11.5–14.5)
EST. GFR  (NO RACE VARIABLE): >60 ML/MIN/1.73 M^2
GLUCOSE SERPL-MCNC: 101 MG/DL (ref 70–110)
HCT VFR BLD AUTO: 38.5 % (ref 37–48.5)
HGB BLD-MCNC: 12.5 G/DL (ref 12–16)
IMM GRANULOCYTES # BLD AUTO: 0.02 K/UL (ref 0–0.04)
IMM GRANULOCYTES NFR BLD AUTO: 0.3 % (ref 0–0.5)
LYMPHOCYTES # BLD AUTO: 1 K/UL (ref 1–4.8)
LYMPHOCYTES NFR BLD: 13.3 % (ref 18–48)
MCH RBC QN AUTO: 29.1 PG (ref 27–31)
MCHC RBC AUTO-ENTMCNC: 32.5 G/DL (ref 32–36)
MCV RBC AUTO: 90 FL (ref 82–98)
MONOCYTES # BLD AUTO: 0.4 K/UL (ref 0.3–1)
MONOCYTES NFR BLD: 5.4 % (ref 4–15)
NEUTROPHILS # BLD AUTO: 6.2 K/UL (ref 1.8–7.7)
NEUTROPHILS NFR BLD: 80 % (ref 38–73)
NRBC BLD-RTO: 0 /100 WBC
PLATELET # BLD AUTO: 230 K/UL (ref 150–450)
PMV BLD AUTO: 10 FL (ref 9.2–12.9)
POTASSIUM SERPL-SCNC: 4.1 MMOL/L (ref 3.5–5.1)
PROT SERPL-MCNC: 6 G/DL (ref 6–8.4)
RBC # BLD AUTO: 4.29 M/UL (ref 4–5.4)
SODIUM SERPL-SCNC: 139 MMOL/L (ref 136–145)
T4 FREE SERPL-MCNC: 0.88 NG/DL (ref 0.71–1.51)
TSH SERPL DL<=0.005 MIU/L-ACNC: 1.5 UIU/ML (ref 0.4–4)
WBC # BLD AUTO: 7.72 K/UL (ref 3.9–12.7)

## 2024-10-30 PROCEDURE — 1157F ADVNC CARE PLAN IN RCRD: CPT | Mod: HCNC,CPTII,S$GLB, | Performed by: INTERNAL MEDICINE

## 2024-10-30 PROCEDURE — 99214 OFFICE O/P EST MOD 30 MIN: CPT | Mod: HCNC,S$GLB,, | Performed by: INTERNAL MEDICINE

## 2024-10-30 PROCEDURE — 85025 COMPLETE CBC W/AUTO DIFF WBC: CPT | Mod: HCNC | Performed by: INTERNAL MEDICINE

## 2024-10-30 PROCEDURE — 36415 COLL VENOUS BLD VENIPUNCTURE: CPT | Mod: HCNC | Performed by: INTERNAL MEDICINE

## 2024-10-30 PROCEDURE — 84443 ASSAY THYROID STIM HORMONE: CPT | Mod: HCNC | Performed by: INTERNAL MEDICINE

## 2024-10-30 PROCEDURE — 1160F RVW MEDS BY RX/DR IN RCRD: CPT | Mod: HCNC,CPTII,S$GLB, | Performed by: INTERNAL MEDICINE

## 2024-10-30 PROCEDURE — 80053 COMPREHEN METABOLIC PANEL: CPT | Mod: HCNC | Performed by: INTERNAL MEDICINE

## 2024-10-30 PROCEDURE — 99999 PR PBB SHADOW E&M-EST. PATIENT-LVL IV: CPT | Mod: PBBFAC,HCNC,, | Performed by: INTERNAL MEDICINE

## 2024-10-30 PROCEDURE — 1159F MED LIST DOCD IN RCRD: CPT | Mod: HCNC,CPTII,S$GLB, | Performed by: INTERNAL MEDICINE

## 2024-10-30 PROCEDURE — 3288F FALL RISK ASSESSMENT DOCD: CPT | Mod: HCNC,CPTII,S$GLB, | Performed by: INTERNAL MEDICINE

## 2024-10-30 PROCEDURE — 1101F PT FALLS ASSESS-DOCD LE1/YR: CPT | Mod: HCNC,CPTII,S$GLB, | Performed by: INTERNAL MEDICINE

## 2024-10-30 PROCEDURE — 84439 ASSAY OF FREE THYROXINE: CPT | Mod: HCNC | Performed by: INTERNAL MEDICINE

## 2024-10-30 PROCEDURE — G2211 COMPLEX E/M VISIT ADD ON: HCPCS | Mod: HCNC,S$GLB,, | Performed by: INTERNAL MEDICINE

## 2024-11-15 ENCOUNTER — HOSPITAL ENCOUNTER (OUTPATIENT)
Dept: RADIOLOGY | Facility: HOSPITAL | Age: 89
Discharge: HOME | End: 2024-11-15
Attending: INTERNAL MEDICINE
Payer: MEDICARE

## 2024-11-15 DIAGNOSIS — R19.00 ABDOMINAL MASS, UNSPECIFIED ABDOMINAL LOCATION: ICD-10-CM

## 2024-11-15 PROCEDURE — 76705 ECHO EXAM OF ABDOMEN: CPT | Mod: 26,HCNC,, | Performed by: RADIOLOGY

## 2024-11-15 PROCEDURE — 76705 ECHO EXAM OF ABDOMEN: CPT | Mod: TC,HCNC

## 2024-11-18 ENCOUNTER — TELEPHONE (OUTPATIENT)
Dept: INTERNAL MEDICINE | Facility: CLINIC | Age: 89
End: 2024-11-18
Payer: MEDICARE

## 2024-11-29 ENCOUNTER — TELEPHONE (OUTPATIENT)
Dept: NEUROLOGY | Facility: CLINIC | Age: 89
End: 2024-11-29
Payer: MEDICARE

## 2024-12-08 NOTE — TELEPHONE ENCOUNTER
No care due was identified.  Health [facility] Embedded Care Due Messages. Reference number: [id number].   12/08/2024 4:00:59 PM CST

## 2024-12-09 RX ORDER — LISINOPRIL 10 MG/1
TABLET ORAL
Qty: 135 TABLET | Refills: 3 | Status: SHIPPED | OUTPATIENT
Start: 2024-12-09

## 2024-12-09 NOTE — TELEPHONE ENCOUNTER
Refill Decision Note   Nany Crenshaw  is requesting a refill authorization.  Brief Assessment and Rationale for Refill:  Approve     Medication Therapy Plan:         Pharmacist review requested: Yes   Extended chart review required: Yes   Comments:     Note composed:2:28 PM 12/09/2024

## 2024-12-09 NOTE — TELEPHONE ENCOUNTER
Refill Routing Note   Medication(s) are not appropriate for processing by [facility] for the following reason(s):        Drug-disease interaction    [facility] action(s):  Defer        Medication Therapy Plan: Drug-Disease: lisinopriL and Hyponatremia    Pharmacist review requested: Yes     Appointments  past 12m or future 3m with PCP    Date Provider   Last Visit   10/30/2024 Ivette Mcdaniel MD   Next Visit   Visit date not found Ivette Mcdaniel MD   ED visits in past 90 days: 0        Note composed:2:04 PM 12/09/2024

## 2025-01-28 ENCOUNTER — TELEPHONE (OUTPATIENT)
Dept: CARDIOLOGY | Facility: CLINIC | Age: OVER 89
End: 2025-01-28
Payer: MEDICARE

## 2025-01-28 NOTE — TELEPHONE ENCOUNTER
Patient daughter wants to know if it is safe for her mother to fly on a plane to [facility]. Please advise, next appointment is 2/27. [email or web address]

## 2025-01-30 DIAGNOSIS — Z00.00 ENCOUNTER FOR MEDICARE ANNUAL WELLNESS EXAM: ICD-10-CM

## 2025-03-04 ENCOUNTER — PATIENT MESSAGE (OUTPATIENT)
Dept: INTERNAL MEDICINE | Facility: CLINIC | Age: OVER 89
End: 2025-03-04
Payer: MEDICARE

## 2025-03-12 RX ORDER — LEVOTHYROXINE SODIUM 50 UG/1
50 TABLET ORAL
Qty: 90 TABLET | Refills: 2 | Status: SHIPPED | OUTPATIENT
Start: 2025-03-12

## 2025-03-12 NOTE — TELEPHONE ENCOUNTER
No care due was identified.  Health [facility] Embedded Care Due Messages. Reference number: [id number].   3/12/2025 2:49:02 PM CDT

## 2025-03-13 ENCOUNTER — PATIENT MESSAGE (OUTPATIENT)
Dept: DERMATOLOGY | Facility: CLINIC | Age: OVER 89
End: 2025-03-13
Payer: MEDICARE

## 2025-03-13 NOTE — TELEPHONE ENCOUNTER
Refill Decision Note   Nany Crenshaw  is requesting a refill authorization.  Brief Assessment and Rationale for Refill:  Approve     Medication Therapy Plan:        Comments:     Note composed:9:15 PM 03/12/2025

## 2025-03-17 ENCOUNTER — TELEPHONE (OUTPATIENT)
Dept: DERMATOLOGY | Facility: CLINIC | Age: OVER 89
End: 2025-03-17
Payer: MEDICARE

## 2025-03-17 NOTE — TELEPHONE ENCOUNTER
Pt scheduled----- Message from French Soriano sent at 3/17/2025 11:43 AM CDT -----  Regarding: appt access  PATIENT Roel's daughter Marisol called to schedule EP appt. CC: redness and dryness, widespread on body and scalp. Requesting refill on Fluorouracil Cream USP 5%. Relayed Nurse Soy's msg about needing an in-person visit. None available til July, pt needs to be seen sooner  Please call back at [phone], prefers Wed/Fri afternoon appts if possible

## 2025-03-19 ENCOUNTER — OFFICE VISIT (OUTPATIENT)
Dept: DERMATOLOGY | Facility: CLINIC | Age: OVER 89
End: 2025-03-19
Payer: MEDICARE

## 2025-03-19 DIAGNOSIS — L57.0 ACTINIC KERATOSES: Primary | ICD-10-CM

## 2025-03-19 DIAGNOSIS — D48.5 NEOPLASM OF UNCERTAIN BEHAVIOR OF SKIN: ICD-10-CM

## 2025-03-19 PROCEDURE — 99213 OFFICE O/P EST LOW 20 MIN: CPT | Mod: HCNC,S$GLB,, | Performed by: DERMATOLOGY

## 2025-03-19 PROCEDURE — 1157F ADVNC CARE PLAN IN RCRD: CPT | Mod: HCNC,CPTII,S$GLB, | Performed by: DERMATOLOGY

## 2025-03-19 PROCEDURE — 99999 PR PBB SHADOW E&M-EST. PATIENT-LVL II: CPT | Mod: PBBFAC,HCNC,, | Performed by: DERMATOLOGY

## 2025-03-19 RX ORDER — FLUOROURACIL 50 MG/G
CREAM TOPICAL
Qty: 40 G | Refills: 3 | Status: SHIPPED | OUTPATIENT
Start: 2025-03-19

## 2025-03-19 NOTE — PROGRESS NOTES
Subjective:      Patient ID:  Nany Crenshaw is a 89 y.o. female who presents for   Chief Complaint   Patient presents with    Follow-up     Pt present with daughter,who helps with assisted history and interpretation.  Hx of NMSC  This is a high risk patient here to check for the development of new lesions.     Follow-up - Follow-up  Symptom course: worsening  Affected locations: left arm, right arm, scalp and chest      Here with both daughters today to discuss several skin concerns. Pt with hx of NMSC s/p Mohs in the past.  Notes several growths on arms that are red and crusted. Not bleeding or painful. Also with several stuck on lesions on the upper back.  Has red scaly plaques on face as well; they are interested in using Efudex for treatment of skin growths if possible.  At last visit 4/24 advised serial cryosurgery as a treatment option and were lost to followup.      Review of Systems    Objective:   Physical Exam   Constitutional: She appears well-developed. She is in a wheelchair.    Neurological: She is alert and oriented to person, place, and time.   Psychiatric: She has a normal mood and affect. She has a flat affect.   Skin:   Areas Examined (abnormalities noted in diagram):   Scalp / Hair Palpated and Inspected  Head / Face Inspection Performed  Neck Inspection Performed  Chest / Axilla Inspection Performed  Abdomen Inspection Performed  Back Inspection Performed  RUE Inspected  LUE Inspection Performed                Diagram Legend     Erythematous scaling macule/papule c/w actinic keratosis       Vascular papule c/w angioma      Pigmented verrucoid papule/plaque c/w seborrheic keratosis      Yellow umbilicated papule c/w sebaceous hyperplasia      Irregularly shaped tan macule c/w lentigo     1-2 mm smooth white papules consistent with Milia      Movable subcutaneous cyst with punctum c/w epidermal inclusion cyst      Subcutaneous movable cyst c/w pilar cyst      Firm pink to brown papule c/w  dermatofibroma      Pedunculated fleshy papule(s) c/w skin tag(s)      Evenly pigmented macule c/w junctional nevus     Mildly variegated pigmented, slightly irregular-bordered macule c/w mildly atypical nevus      Flesh colored to evenly pigmented papule c/w intradermal nevus       Pink pearly papule/plaque c/w basal cell carcinoma      Erythematous hyperkeratotic cursted plaque c/w SCC      Surgical scar with no sign of skin cancer recurrence      Open and closed comedones      Inflammatory papules and pustules      Verrucoid papule consistent consistent with wart     Erythematous eczematous patches and plaques     Dystrophic onycholytic nail with subungual debris c/w onychomycosis     Umbilicated papule    Erythematous-base heme-crusted tan verrucoid plaque consistent with inflamed seborrheic keratosis     Erythematous Silvery Scaling Plaque c/w Psoriasis     See annotation    Updated photographs:                  Assessment / Plan:      89 yo female on Eliquis with memory changes accompanied by daughters to discuss management of non healing skin lesions on face, arms and upper back.  Hx of falls and dizziness.    Actinic keratoses  -     fluorouraciL (EFUDEX) 5 % cream; Apply a thin layer to AA on face, chest and hands bid x 2 weeks (Patient not taking: Reported on 3/28/2025)  Dispense: 40 g; Refill: 3    Neoplasm of uncertain behavior of skin    Refill efudex given to apply for actinic damage on the face, which I believe will be effective for her, total use time 2 weeks.    Pt with several other lesions concerning for NMSC.  Due to size I do not believe that 4-6 weeks of Efudex therapy will be effective treatment for her.  The lesions do not appear to be high risk cutaneous malignancies at this time - ie no rapidly growing, painful, bleeding lesions.  Accordingly being low risk they pose less risk of any metastasis from deciding to not treat, however, this could change over time.    Discussed that typical  surgical treatment begins with skin biopsies and then definitive surgical removal.  Daughters would like to discuss what best approach would be given her comorbities, potential for pain/complications, and wound care needs.    I have discussed her case with our derm surgeon Dr. Sd Mclaughlin who is in agreement to see her for a surgical consultation for the larger clinically appearing NMSC on the arms.  Lesions on upper back may be NMSC vs possibly Sk's.  Family unsure if they would like to proceed with biopsies.    Will notify family that Dr Mclaughlin can see them in consult to discuss surgical interventions further.         No follow-ups on file.

## 2025-03-28 ENCOUNTER — OFFICE VISIT (OUTPATIENT)
Dept: CARDIOLOGY | Facility: CLINIC | Age: OVER 89
End: 2025-03-28
Payer: MEDICARE

## 2025-03-28 VITALS
DIASTOLIC BLOOD PRESSURE: 64 MMHG | OXYGEN SATURATION: 95 % | HEART RATE: 68 BPM | WEIGHT: 109.13 LBS | SYSTOLIC BLOOD PRESSURE: 130 MMHG | BODY MASS INDEX: 23.54 KG/M2 | HEIGHT: 57 IN

## 2025-03-28 DIAGNOSIS — E78.00 PURE HYPERCHOLESTEROLEMIA: ICD-10-CM

## 2025-03-28 DIAGNOSIS — I82.531 CHRONIC EMBOLISM AND THROMBOSIS OF RIGHT POPLITEAL VEIN: ICD-10-CM

## 2025-03-28 DIAGNOSIS — I73.9 PERIPHERAL VASCULAR DISEASE, UNSPECIFIED: ICD-10-CM

## 2025-03-28 DIAGNOSIS — R55 NEAR SYNCOPE: ICD-10-CM

## 2025-03-28 DIAGNOSIS — R42 DIZZINESS: Primary | ICD-10-CM

## 2025-03-28 DIAGNOSIS — I48.11 LONGSTANDING PERSISTENT ATRIAL FIBRILLATION: ICD-10-CM

## 2025-03-28 DIAGNOSIS — I25.10 CORONARY ARTERY DISEASE INVOLVING NATIVE CORONARY ARTERY OF NATIVE HEART WITHOUT ANGINA PECTORIS: ICD-10-CM

## 2025-03-28 DIAGNOSIS — I10 BENIGN HYPERTENSION: ICD-10-CM

## 2025-03-28 PROCEDURE — 99999 PR PBB SHADOW E&M-EST. PATIENT-LVL III: CPT | Mod: PBBFAC,HCNC,, | Performed by: INTERNAL MEDICINE

## 2025-03-28 NOTE — PROGRESS NOTES
Subjective   Patient ID:  Nany Crenshaw is a 89 y.o. female who presents for follow-up of No chief complaint on file.      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  She's had some inflammation and prominence of her varicosities in the right leg.  She's mainly worried about recurrence of clot of blood thinners.  She denies any worsening cardiopulmonary complaints.  She's not had any chest pain, shortness of breath or palpitations.  She denies any PND, orthopnea or lower edema.  She is not expressing dizziness, presyncope or syncope.     Here for follow-up of DVT and coronary artery disease.  She still has some prominence in varicosities in her legs.  She says at times it gets red around her right ankle but does not cause her pain.  She tolerated her blood thinner as had no problems.  She recently went to [facility] without any issues on the airplane.  Do a trip to [place] planned as well.  She denies any PND, orthopnea or lower extremity edema otherwise.  She's not expressing dizziness, presyncope or syncope.     -cont med tx  -cont to follow sx mainly  -Diet and exercise tolerated  -On Eliquis for OAC indefinitely for recurrent DVT     LE venous US 6/1/22  Thrombosis is seen within the left iliac, common femoral, femoral, popliteal, and peroneal veins.  No evidence of clot involving the right common femoral vein or left greater saphenous and anterior and posterior tibial veins.     lower extremity ultrasound:   11-17  Nonocclusive thrombus in the right femoral vein and right popliteal vein.     Echo 5/24/24    Left Ventricle: The left ventricle is normal in size. Increased wall thickness. Moderate septal thickening. There is normal systolic function with a visually estimated ejection fraction of 60 - 65%. Grade I diastolic dysfunction.    Right Ventricle: Normal right ventricular cavity size. Systolic  function is normal.    Left Atrium: Left atrium is mildly dilated.    Aortic Valve: There is mild aortic regurgitation.    Tricuspid Valve: There is mild regurgitation.    Pulmonary Artery: The estimated pulmonary artery systolic pressure is 24 mmHg.    IVC/SVC: Normal venous pressure at 3 mmHg.     Event monitor 5/18/21  Negative event monitor with no clinical arrhythmias.  Symptoms corresponding with normal sinus rhythm.            Carotid ultrasound:  5-18  CONCLUSIONS   There is 20 - 39% right Internal Carotid stenosis.  There is 20 - 39% left Internal Carotid stenosis.     Lower extremity arterial ultrasound:  Impression:  1-  no hemodynamically significant plaque bilaterally     Lower extremity venous ultrasound:  TEST DESCRIPTION   Impression:    RIGHT:  Chronic occlusive DVT of the Femoral Vein.    LEFT:  No evidence of Left lower extremity DVT.       1/6/20 Daughter provided translation  Denies CP or SOB  Walks with a walker at home  EKG NSR LBBB - no change     Went to the ER 4/4/21  This is a 89-year-old  female presents to the emergency room with her grandson who is a 1st year resident at [employer] studying Internal Medicine.  The patient was taking off her slippers and she slipped and fell striking the left side of her thorax.  The patient complains of pain in the left lateral thorax and the left lateral thoracic back to midline spine is spared along its entire course.  There is no shortness of breath or precordial chest discomfort the patient has no abdominal complaints.  She is essentially otherwise well without other injuries or problems she has no extremity injuries.  There is no known head trauma.  The patient notes no abrasions contusions or tender areas on her scalp.  She has no neck pain.  She has no neurologic deficits.  There is no cough or difficulty breathing.        5/14/21 Reports several episodes of weakness with near syncope over the last few weeks  EKG NSR LBBB - no change. Denies CP  or SOB   Echo and event monitor for near syncopal spells  Needs to go to the ER if symptoms worsen     6/21/21 Denies CP or SOB. Still with dizzy spells - denies LOC or falls   Event monitor negative for arrhythmias during dizzy spells  Echo with good EF  Continue to observe - could decrease lisinopril or metoprolol dose if dizzy spells persist  Decrease eliquis 2.5 bid  Continue Rx for HTN, HLD, DVT, CAD      10/1/21 Denies CP, SOB, or dizziness. Mild ankle edema  EKG NSR LBBB  PRN lasix for LE edema    Continue Rx for HTN, HLD, DVT, CAD   OV 6 months     Went to the ER 6/2/22  HPI: This is a 89 y.o.female patient, with a PMHx of DVT, CAD and HTN , presenting to the ED for follow up from her ED visit yesterday regarding swelling of the left leg. Patient's daughter reports patient was here yesterday and diagnosed with DVT to her left leg. Daughter reports patient was told to come back this morning for her 2nd Lovenox injection and to be set up with home health. Daughter reports the patient's leg swelling has decreased and has gotten better since the administration of the first injection last night in the ED. Patient denies any other complications. Patient denies any history of blood clots in the lungs. Patient denies any shortness of breath, chest pain, vomiting, or any other associated symptoms. No known drug allergies.     6/22/22 Went back to eliquis after taking lovenox for 2 weeks. LLE still with mild erythema and swelling. Denies CP or SOB  EKG NSR LBBB   Recurrent DVT on eliquis  Stop eliquis  Start coumadin 5 mg qd  Refer to coumadin clinic  Bridge with lovenox 60 mg bid until INR  > 2  OV 1 month  Continue Rx for HTN, HLD, CAD      8/4/22 INR in range on coumadin. Having some chronic skin changes in LE bilaterally  Denies CP or SOB   Recommended hydrating lotion for chronic skin changes  Continue Rx for HTN, HLD, CAD, DVT  OV 6 months     3/10/23 Here for bilateral foot swelling. Denies CP or SOB  Would  like to change coumadin back to eliquis  Occasional dizziness  EKG NSR LBBB  Lasix prn for LE edema  Change coumadin to eliquis 2.5 bid  Continue Rx for HTN, HLD, CAD, DVT  OV 6 month     11/17/23 Denies CP or SOB. Takes an extra lasix if her ankles swell  BP controlled  Planning a trip to [facility]  Continue Rx for HTN, HLD, CAD, DVT  OV 6 month     Went to the ER 5/2/24  This 89 y.o female, with a medical history of Coronary artery disease, Hypertension, and DVT (bilateral), presents to the ED accompanied by her daughter c/o bruising and redness to the bilateral lower legs. Daughter reports that pt began to experience bruising to the right lower leg s/p a fall on 4/19/24. No loss of consciousness or injuries. She states that pt later began to c/o a burning sensation to the area and notes that the bruise began to increase in size and was surrounded by redness. Daughter reports that pt visited her PCP last week (4/29/24) for the symptoms and was placed on 500 mg Keflex for treatment. Daughter states, however, that she became concerned as this morning pt's bilateral lower legs were hot to the touch (up to the knees). She notes that the redness also appears to be moving up the right leg and adds that pt now also has an area of bruising to the left lower leg as well prompting her to bring pt into the ED today. Of note, pt is currently on Eliquis, which daughter believes she is compliant with. Pt denies any pain at this time. She also denies fever, chest pain, shortness of breath or any other associated symptoms.   BNP 27, troponin negative  EKG 5/2/24 NSR LBBB     5/9/24 Denies CP or SOB, occasional dizziness and falls. BP ok  Still with ulcer on RLE - improving on Abx per family  Echo for dizziness and frequent falls and new systolic murmur  Continue Rx for cellulitis  Continue Rx for HTN, HLD, CAD, DVT     6/21/24 Denies CP or SOB  BP controlled  RLE wound continues to improve  Denies dizziness or falls  Echo with  Mild AI and normal EF  Continue Rx for HTN, HLD, CAD, DVT  OV 6 months    3/28/25 Planning a trip to [facility].   Denies CP or SOB  Very alert for 89       Review of Systems   Constitutional: Negative for decreased appetite.   HENT:  Negative for ear discharge.    Eyes:  Negative for blurred vision.   Respiratory:  Negative for hemoptysis.    Endocrine: Negative for polyphagia.   Hematologic/Lymphatic: Negative for adenopathy.   Skin:  Negative for color change.   Musculoskeletal:  Negative for joint swelling.   Genitourinary:  Negative for bladder incontinence.   Neurological:  Negative for brief paralysis.   Psychiatric/Behavioral:  Negative for hallucinations.    Allergic/Immunologic: Negative for hives.          Objective     Physical Exam  Constitutional:       Appearance: She is well-developed.   HENT:      Head: Normocephalic and atraumatic.   Eyes:      Conjunctiva/sclera: Conjunctivae normal.      Pupils: Pupils are equal, round, and reactive to light.   Neck:      Thyroid: No thyromegaly.   Cardiovascular:      Rate and Rhythm: Normal rate and regular rhythm.      Heart sounds: Murmur heard.      Early systolic murmur is present with a grade of 2/6.   Pulmonary:      Effort: Pulmonary effort is normal. No respiratory distress.      Breath sounds: Normal breath sounds.   Abdominal:      General: Bowel sounds are normal.      Palpations: Abdomen is soft.   Musculoskeletal:         General: Normal range of motion.      Cervical back: Normal range of motion and neck supple.      Comments: Prominent varicosities with some inflammation right greater than left   Skin:     General: Skin is warm and dry.   Neurological:      Mental Status: She is alert and oriented to person, place, and time.   Psychiatric:         Behavior: Behavior normal.            Assessment and Plan     1. Dizziness    2. 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 Longstanding persistent atrial fibrillation    4. Near  syncope    5. Benign hypertension    6. Coronary artery disease involving native coronary artery of native heart without angina pectoris    7. Pure hypercholesterolemia    8. Peripheral vascular disease, unspecified        Plan:     Continue Rx for HTN, HLD, CAD, DVT  OV 6 months    Advance Care Planning     Date: 03/28/2025  Patient did not wish or was not able to name a surrogate decision maker or provide an Advance Care Plan.

## 2025-03-30 ENCOUNTER — PATIENT MESSAGE (OUTPATIENT)
Dept: DERMATOLOGY | Facility: CLINIC | Age: OVER 89
End: 2025-03-30
Payer: MEDICARE

## 2025-04-15 ENCOUNTER — PATIENT MESSAGE (OUTPATIENT)
Dept: INTERNAL MEDICINE | Facility: CLINIC | Age: OVER 89
End: 2025-04-15
Payer: MEDICARE

## 2025-04-16 ENCOUNTER — HOSPITAL ENCOUNTER (OUTPATIENT)
Dept: RADIOLOGY | Facility: HOSPITAL | Age: OVER 89
Discharge: HOME | End: 2025-04-16
Attending: INTERNAL MEDICINE
Payer: MEDICARE

## 2025-04-16 ENCOUNTER — OFFICE VISIT (OUTPATIENT)
Dept: INTERNAL MEDICINE | Facility: CLINIC | Age: OVER 89
End: 2025-04-16
Payer: MEDICARE

## 2025-04-16 VITALS
OXYGEN SATURATION: 93 % | BODY MASS INDEX: 23.54 KG/M2 | SYSTOLIC BLOOD PRESSURE: 140 MMHG | HEIGHT: 57 IN | DIASTOLIC BLOOD PRESSURE: 62 MMHG | WEIGHT: 109.13 LBS | HEART RATE: 74 BPM

## 2025-04-16 DIAGNOSIS — I82.409 DEEP VEIN THROMBOSIS (DVT) OF LOWER EXTREMITY, UNSPECIFIED CHRONICITY, UNSPECIFIED LATERALITY, UNSPECIFIED VEIN: ICD-10-CM

## 2025-04-16 DIAGNOSIS — R54 AGE-RELATED PHYSICAL DEBILITY: ICD-10-CM

## 2025-04-16 DIAGNOSIS — Z79.01 LONG TERM (CURRENT) USE OF ANTICOAGULANTS: ICD-10-CM

## 2025-04-16 DIAGNOSIS — I10 BENIGN HYPERTENSION: ICD-10-CM

## 2025-04-16 DIAGNOSIS — M54.50 LOW BACK PAIN WITHOUT SCIATICA, UNSPECIFIED BACK PAIN LATERALITY, UNSPECIFIED CHRONICITY: Primary | ICD-10-CM

## 2025-04-16 DIAGNOSIS — E03.9 HYPOTHYROIDISM (ACQUIRED): ICD-10-CM

## 2025-04-16 DIAGNOSIS — I48.11 LONGSTANDING PERSISTENT ATRIAL FIBRILLATION: ICD-10-CM

## 2025-04-16 DIAGNOSIS — M54.50 LOW BACK PAIN WITHOUT SCIATICA, UNSPECIFIED BACK PAIN LATERALITY, UNSPECIFIED CHRONICITY: ICD-10-CM

## 2025-04-16 PROCEDURE — G2211 COMPLEX E/M VISIT ADD ON: HCPCS | Mod: HCNC,S$GLB,, | Performed by: INTERNAL MEDICINE

## 2025-04-16 PROCEDURE — 99999 PR PBB SHADOW E&M-EST. PATIENT-LVL IV: CPT | Mod: PBBFAC,HCNC,, | Performed by: INTERNAL MEDICINE

## 2025-04-16 PROCEDURE — 72100 X-RAY EXAM L-S SPINE 2/3 VWS: CPT | Mod: 26,HCNC,, | Performed by: RADIOLOGY

## 2025-04-16 PROCEDURE — 1160F RVW MEDS BY RX/DR IN RCRD: CPT | Mod: HCNC,CPTII,S$GLB, | Performed by: INTERNAL MEDICINE

## 2025-04-16 PROCEDURE — 3288F FALL RISK ASSESSMENT DOCD: CPT | Mod: HCNC,CPTII,S$GLB, | Performed by: INTERNAL MEDICINE

## 2025-04-16 PROCEDURE — 99214 OFFICE O/P EST MOD 30 MIN: CPT | Mod: HCNC,S$GLB,, | Performed by: INTERNAL MEDICINE

## 2025-04-16 PROCEDURE — 72100 X-RAY EXAM L-S SPINE 2/3 VWS: CPT | Mod: TC,HCNC

## 2025-04-16 PROCEDURE — 1159F MED LIST DOCD IN RCRD: CPT | Mod: HCNC,CPTII,S$GLB, | Performed by: INTERNAL MEDICINE

## 2025-04-16 PROCEDURE — 1157F ADVNC CARE PLAN IN RCRD: CPT | Mod: HCNC,CPTII,S$GLB, | Performed by: INTERNAL MEDICINE

## 2025-04-16 PROCEDURE — 1101F PT FALLS ASSESS-DOCD LE1/YR: CPT | Mod: HCNC,CPTII,S$GLB, | Performed by: INTERNAL MEDICINE

## 2025-04-16 RX ORDER — LIDOCAINE 50 MG/G
1 PATCH TOPICAL DAILY
Qty: 30 PATCH | Refills: 2 | Status: SHIPPED | OUTPATIENT
Start: 2025-04-16

## 2025-04-16 NOTE — PROGRESS NOTES
"Subjective:       Patient ID: Nany Crenshaw is a 89 y.o. female.    Chief Complaint: Back Pain  This is a 89-year-old who presents today for follow-up she has been having some issues with her back pain over the last day or 2 they report no injury or fall if they aware of but she started having some discomfort in the evening when she was laying down and seemed to be uncomfortable they tried to give her Tylenol but she has been less wanting to take her medicines recently.  Pain seems to hurt when she moves she has no urinary symptoms or cough she had been walking with a walker at home but mobility has been decreased more recently she also has some scalp itching    Back Pain  Pertinent negatives include no fever.     Review of Systems   Constitutional:  Negative for fever.   Musculoskeletal:  Positive for arthralgias, back pain and gait problem.   Psychiatric/Behavioral:          Memory decline   No wanting to take meds at times       Objective:      Blood pressure (!) 140/62, pulse 74, height 4' 9" (1.448 m), weight 49.5 kg (109 lb 2 oz),   Physical Exam  Constitutional:       General: She is not in acute distress.     Comments: Seated in wheelchair    HENT:      Head: Normocephalic.      Comments: Dermatitis scalp      Mouth/Throat:      Pharynx: Oropharynx is clear.   Cardiovascular:      Rate and Rhythm: Normal rate and regular rhythm.      Heart sounds: Murmur heard.      No friction rub. No gallop.   Pulmonary:      Effort: Pulmonary effort is normal. No respiratory distress.      Breath sounds: Normal breath sounds.   Abdominal:      General: Bowel sounds are normal.      Palpations: Abdomen is soft. There is no mass.      Tenderness: There is no abdominal tenderness.   Musculoskeletal:      Cervical back: Neck supple.      Comments: Slowed gait   Pt reports pain right back with rom   No rash    Neurological:      Mental Status: She is alert.         Assessment:       1. Low back pain without sciatica, " unspecified back pain laterality, unspecified chronicity    2. Benign hypertension    3. Longstanding persistent atrial fibrillation    4. Hypothyroidism (acquired)    5. 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 Long term (current) use of anticoagulants    7. Deep vein thrombosis (DVT) of lower extremity, unspecified chronicity, unspecified laterality, unspecified vein        Plan:       Nany was seen today for back pain.    Diagnoses and all orders for this visit:    Low back pain without sciatica, unspecified back pain laterality, unspecified chronicity  Avoid NSAIDs on blood thinners but she can use a Lidoderm  -     LIDOcaine (LIDODERM) 5 %; Place 1 patch onto the skin once daily. Remove & Discard patch within 12 hours or as directed by MD  -     X-Ray Lumbar Spine Ap And Lateral; Future  -     Ambulatory referral/consult to Home Health; Future    Benign hypertension  For now they are monitoring her home numbers and she has not needed them but they will continue to monitor  -     CBC Auto Differential; Future  -     Comprehensive Metabolic Panel; Future  -     TSH; Future  -     T4, Free; Future    Longstanding persistent atrial fibrillation  Long-term use of anticoagulation  DVT  She remains on Eliquis    Hypothyroidism (acquired)  Continue current regimen she has missed some of her doses recently    For memory loss they do have a follow up with an outlying neurologist planned    Scalp itching she can use hydrocortisone along with Nizoral shampoo    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

## 2025-04-17 ENCOUNTER — RESULTS FOLLOW-UP (OUTPATIENT)
Dept: INTERNAL MEDICINE | Facility: CLINIC | Age: OVER 89
End: 2025-04-17

## 2025-04-17 ENCOUNTER — PATIENT MESSAGE (OUTPATIENT)
Dept: INTERNAL MEDICINE | Facility: CLINIC | Age: OVER 89
End: 2025-04-17
Payer: MEDICARE

## 2025-04-23 ENCOUNTER — OFFICE VISIT (OUTPATIENT)
Dept: PAIN MEDICINE | Facility: CLINIC | Age: OVER 89
End: 2025-04-23
Payer: MEDICARE

## 2025-04-23 ENCOUNTER — PATIENT MESSAGE (OUTPATIENT)
Dept: INTERNAL MEDICINE | Facility: CLINIC | Age: OVER 89
End: 2025-04-23
Payer: MEDICARE

## 2025-04-23 VITALS
WEIGHT: 109.13 LBS | OXYGEN SATURATION: 100 % | SYSTOLIC BLOOD PRESSURE: 162 MMHG | DIASTOLIC BLOOD PRESSURE: 77 MMHG | HEART RATE: 86 BPM | HEIGHT: 57 IN | RESPIRATION RATE: 19 BRPM | BODY MASS INDEX: 23.54 KG/M2

## 2025-04-23 DIAGNOSIS — M79.18 MYOFASCIAL PAIN SYNDROME: ICD-10-CM

## 2025-04-23 DIAGNOSIS — M47.816 LUMBAR SPONDYLOSIS: ICD-10-CM

## 2025-04-23 DIAGNOSIS — R10.2 PELVIC AND PERINEAL PAIN: Primary | ICD-10-CM

## 2025-04-23 DIAGNOSIS — N34.3 DYSURIA-FREQUENCY SYNDROME: Primary | ICD-10-CM

## 2025-04-23 PROCEDURE — 1159F MED LIST DOCD IN RCRD: CPT | Mod: CPTII,S$GLB,, | Performed by: STUDENT IN AN ORGANIZED HEALTH CARE EDUCATION/TRAINING PROGRAM

## 2025-04-23 PROCEDURE — 1101F PT FALLS ASSESS-DOCD LE1/YR: CPT | Mod: CPTII,S$GLB,, | Performed by: STUDENT IN AN ORGANIZED HEALTH CARE EDUCATION/TRAINING PROGRAM

## 2025-04-23 PROCEDURE — 3288F FALL RISK ASSESSMENT DOCD: CPT | Mod: CPTII,S$GLB,, | Performed by: STUDENT IN AN ORGANIZED HEALTH CARE EDUCATION/TRAINING PROGRAM

## 2025-04-23 PROCEDURE — 1157F ADVNC CARE PLAN IN RCRD: CPT | Mod: CPTII,S$GLB,, | Performed by: STUDENT IN AN ORGANIZED HEALTH CARE EDUCATION/TRAINING PROGRAM

## 2025-04-23 PROCEDURE — 1160F RVW MEDS BY RX/DR IN RCRD: CPT | Mod: CPTII,S$GLB,, | Performed by: STUDENT IN AN ORGANIZED HEALTH CARE EDUCATION/TRAINING PROGRAM

## 2025-04-23 PROCEDURE — G2211 COMPLEX E/M VISIT ADD ON: HCPCS | Mod: S$GLB,,, | Performed by: STUDENT IN AN ORGANIZED HEALTH CARE EDUCATION/TRAINING PROGRAM

## 2025-04-23 PROCEDURE — 1125F AMNT PAIN NOTED PAIN PRSNT: CPT | Mod: CPTII,S$GLB,, | Performed by: STUDENT IN AN ORGANIZED HEALTH CARE EDUCATION/TRAINING PROGRAM

## 2025-04-23 PROCEDURE — 99999 PR PBB SHADOW E&M-EST. PATIENT-LVL IV: CPT | Mod: PBBFAC,HCNC,, | Performed by: STUDENT IN AN ORGANIZED HEALTH CARE EDUCATION/TRAINING PROGRAM

## 2025-04-23 PROCEDURE — 99204 OFFICE O/P NEW MOD 45 MIN: CPT | Mod: S$GLB,,, | Performed by: STUDENT IN AN ORGANIZED HEALTH CARE EDUCATION/TRAINING PROGRAM

## 2025-04-23 NOTE — PROGRESS NOTES
Chronic Pain - New Consult    Referring Physician: Cristobal Mari    Chief Complaint:   Chief Complaint   Patient presents with    Low-back Pain       SUBJECTIVE:    Nany Crenshaw presents to the clinic for the evaluation of lower back pain. Her daughters speak on her behalf. The pain started 2 weeks ago following chair exercises and symptoms have been worsening.  She was preparing for a trip to [facility] and was trying to get stronger before her trip. The pain is located in the left paraspinal  area with no radiation.  The pain is described as sharp and is rated as 0/10. The pain is rated with a score of  0/10 on the BEST day and a score of 7/10 on the WORST day.  Symptoms interfere with daily activity. The pain is exacerbated by movements, pressure on the left side.  The pain is mitigated by rest.     She has tried tylenol and lidoderm patches with no benefit.  She reports she had a fall 4 years ago after which she broke her pelvis (fracture of right superior pubic rami) .  After that, she started requiring a walker.     Physical Therapy/Home Exercise: yes  did PT in 2021 after pelvic fracture    Pain Disability Index Review:       No data to display                Pain Medications:   - tylenol   - lidoderm patch     report:  Reviewed and consistent with medication use as prescribed.    Pain Procedures:   None    Imaging:   CT PELVIS WITHOUT CONTRAST     CLINICAL HISTORY:  Pelvic trauma;RIght hip and pelvic pain;     TECHNIQUE:  CT of the pelvis was obtained without the use of IV contrast.     COMPARISON:  No radiographs were obtained.     FINDINGS:  Acute comminuted fracture seen of the medial aspect of the right superior pubic ramus.  No additional acute displaced fractures or dislocation seen.  No diastasis of the pubic symphysis.  There is surrounding hematoma seen about the fracture site and lower pelvic region.     Remote healed fracture deformity is seen of the upper sacrum.  There is lower lumbar  facet arthropathy.  Colonic diverticulosis is seen.  Otherwise no acute abnormality seen involving the lower abdomen and pelvis.     Impression:     Acute comminuted fracture of the right superior pubic ramus with surrounding hematoma.        Electronically signed by:Yassine Bnaks MD  Date:                                            2021  Time:                                           17:11    XR LUMBAR SPINE AP AND LATERAL     CLINICAL HISTORY:  Low back pain, unspecified     FINDINGS:  Lumbar spine two views:     There is a dextroconvex curvature of the lower T-spine.  There is a levoconvex curvature of the L-spine.  There is mild-moderate DJD.  There is grade 1 anterolisthesis of L4 on L5 and L5 on S1.  No fracture dislocation bone destruction seen.  No acute trauma seen.     Impression:     Chronic change as above.        Electronically signed by:Milind Pena MD  Date:                                            2025  Time:                                           07:51    Past Medical History:   Diagnosis Date    Colon polyp     no further colon needed    Coronary artery disease     Diverticulosis     Fracture of right distal radius     GERD (gastroesophageal reflux disease)     Hypertension     Hypothyroidism (acquired)     Osteoporosis, post-menopausal     Scoliosis      Past Surgical History:   Procedure Laterality Date    ADENOIDECTOMY      CATARACT EXTRACTION      ou     SECTION      CHOLECYSTECTOMY      EYE SURGERY  2014    ptosis surgery      HYSTERECTOMY      yag cap      od     Social History[1]  Family History   Problem Relation Name Age of Onset    Cancer Mother          skin cancer     Dementia Mother      Hypertension Mother      Heart disease Brother      Cancer Brother          lung cancer     No Known Problems Father      No Known Problems Sister      No Known Problems Maternal Aunt      No Known Problems Maternal Uncle      No Known Problems Paternal Aunt      No Known  Problems Paternal Uncle      No Known Problems Maternal Grandmother      No Known Problems Maternal Grandfather      No Known Problems Paternal Grandmother      No Known Problems Paternal Grandfather      Amblyopia Neg Hx      Blindness Neg Hx      Cataracts Neg Hx      Diabetes Neg Hx      Glaucoma Neg Hx      Macular degeneration Neg Hx      Retinal detachment Neg Hx      Strabismus Neg Hx      Stroke Neg Hx      Thyroid disease Neg Hx      Melanoma Neg Hx         Review of patient's allergies indicates:   Allergen Reactions    Bactrim [sulfamethoxazole-trimethoprim] Nausea And Vomiting       Current Outpatient Medications   Medication Sig    ciclopirox (PENLAC) 8 % Soln Apply topically nightly.    ELIQUIS 2.5 mg Tab TAKE 1 TABLET(2.5 MG) BY MOUTH TWICE DAILY    fluorouraciL (EFUDEX) 5 % cream Apply a thin layer to AA on face, chest and hands bid x 2 weeks    levothyroxine (SYNTHROID) 50 MCG tablet TAKE 1 TABLET(50 MCG) BY MOUTH EVERY DAY    LIDOcaine (LIDODERM) 5 % Place 1 patch onto the skin once daily. Remove & Discard patch within 12 hours or as directed by MD    lisinopriL 10 MG tablet TAKE 1 AND 1/2 TABLETS(15 MG) BY MOUTH EVERY DAY    metoprolol tartrate (LOPRESSOR) 25 MG tablet TAKE 1 TABLET BY MOUTH TWICE DAILY    nystatin (MYCOSTATIN) cream Apply topically 2 (two) times daily as needed for Dry Skin.    furosemide (LASIX) 20 MG tablet Take 1 tablet (20 mg total) by mouth daily as needed (ankle swelling). (Patient not taking: Reported on 3/28/2025)     No current facility-administered medications for this visit.       REVIEW OF SYSTEMS:    GENERAL:  current fevers or chills, recent use of antibiotics denies.  HEENT:  History of migraines/headaches: denies, History of major throat surgery: denies   RESPIRATORY:  History of home oxygen or pulmonary hypertension/severe breathing dysfunction: denies; Smoking history: none  CARDIOVASCULAR:  Hx of palpitations/rhythm problems: denies Hx of Heart Attacks/Surgery:  "denies  GI:  Recent abdominal discomfort or recent change in bowel habits denies  MUSCULOSKELETAL:  See HPI.  SKIN:  unhealed wounds or rashes: denies  PSYCH: major psychiatric history or recent psychosocial stressors denies  HEMATOLOGY/LYMPHOLOGY:  Hx of prolonged bleeding, Hx of Blood thinner usage: reports eliquis ; reason: DVTs  NEURO:   history of seizures, strokes, chronic/old weakness (such as paralysis or paresis of any body part): denies  All other reviewed and negative other than HPI.    OBJECTIVE:    BP (!) 162/77 (BP Location: Right arm, Patient Position: Sitting)   Pulse 86   Resp 19   Ht 4' 9" (1.448 m)   Wt 49.5 kg (109 lb 2 oz)   LMP  (LMP Unknown)   SpO2 100%   BMI 23.61 kg/m²     PHYSICAL EXAMINATION:  General appearance: Well appearing, in no acute distress, alert and appropriately communicative.  Psych:  Mood and affect appropriate.  Skin: Skin color, texture, turgor normal, no rashes or lesions, in both upper and lower body for exposed skin.  Head/face:  Atraumatic, normocephalic.  Cor: regular rate  Pulm: non-labored breathing  GI: Abdomen non-distended and non-tender.  Back: Straight leg raising in the sitting and supine positions is negative to radicular pain. pain to palpation over the spine or paraspinal muscles (far left, above left iliac crest). Normal range of motion without pain reproduction.  Extremities: No deformities, significant lymphedema, or skin discoloration. No significant open wounds. No major amputations.  Musculoskeletal: hip, sacroiliac and knee provocative maneuvers are negative. Bilateral upper and lower extremity strength is normal and symmetric.  No atrophy or tone abnormalities are noted.  Neuro: Bilateral upper and lower extremity coordination and muscle stretch reflexes abnormalities noted: none.  Currie and/or Clonus: negative; loss of sensation to light touch: none  Gait: wheelchair    CMP  Sodium   Date Value Ref Range Status   04/16/2025 138 136 - 145 " mmol/L Final   10/30/2024 139 136 - 145 mmol/L Final     Potassium   Date Value Ref Range Status   04/16/2025 3.9 3.5 - 5.1 mmol/L Final   10/30/2024 4.1 3.5 - 5.1 mmol/L Final     Chloride   Date Value Ref Range Status   04/16/2025 103 95 - 110 mmol/L Final   10/30/2024 104 95 - 110 mmol/L Final     CO2   Date Value Ref Range Status   04/16/2025 28 23 - 29 mmol/L Final   10/30/2024 27 23 - 29 mmol/L Final     Glucose   Date Value Ref Range Status   10/30/2024 101 70 - 110 mg/dL Final     BUN   Date Value Ref Range Status   04/16/2025 13 10 - 30 mg/dL Final     Creatinine   Date Value Ref Range Status   04/16/2025 0.6 0.5 - 1.4 mg/dL Final     Calcium   Date Value Ref Range Status   04/16/2025 9.0 8.7 - 10.5 mg/dL Final   10/30/2024 9.5 8.7 - 10.5 mg/dL Final     Total Protein   Date Value Ref Range Status   10/30/2024 6.0 6.0 - 8.4 g/dL Final     Albumin   Date Value Ref Range Status   04/16/2025 3.1 (L) 3.5 - 5.2 g/dL Final   10/30/2024 3.2 (L) 3.5 - 5.2 g/dL Final     Total Bilirubin   Date Value Ref Range Status   10/30/2024 0.8 0.1 - 1.0 mg/dL Final     Comment:     For infants and newborns, interpretation of results should be based  on gestational age, weight and in agreement with clinical  observations.    Premature Infant recommended reference ranges:  Up to 24 hours.............<8.0 mg/dL  Up to 48 hours............<12.0 mg/dL  3-5 days..................<15.0 mg/dL  6-29 days.................<15.0 mg/dL       Bilirubin Total   Date Value Ref Range Status   04/16/2025 1.0 0.1 - 1.0 mg/dL Final     Comment:     For infants and newborns, interpretation of results should be based   on gestational age, weight and in agreement with clinical   observations.    Premature Infant recommended reference ranges:   0-24 hours:  <8.0 mg/dL   24-48 hours: <12.0 mg/dL   3-5 days:    <15.0 mg/dL   6-29 days:   <15.0 mg/dL     Alkaline Phosphatase   Date Value Ref Range Status   10/30/2024 60 40 - 150 U/L Final     ALP   Date  Value Ref Range Status   04/16/2025 75 40 - 150 unit/L Final     AST   Date Value Ref Range Status   04/16/2025 19 11 - 45 unit/L Final   10/30/2024 16 10 - 40 U/L Final     ALT   Date Value Ref Range Status   04/16/2025 14 10 - 44 unit/L Final   10/30/2024 10 10 - 44 U/L Final     Anion Gap   Date Value Ref Range Status   04/16/2025 7 (L) 8 - 16 mmol/L Final     eGFR   Date Value Ref Range Status   04/16/2025 >60 >60 mL/min/1.73/m2 Final     Comment:     Estimated GFR calculated using the CKD-EPI creatinine (2021) equation.   10/30/2024 >60.0 >60 mL/min/1.73 m^2 Final         ASSESSMENT: 89 y.o. year old female with chronic pain, consistent with:    1. Pelvic and perineal pain  CT Pelvis Without Contrast      2. Lumbar spondylosis        3. Myofascial pain syndrome          IMPRESSION: Nany Crenshaw presents today for lower back/left paraspinal pain.  History and physical exam are consistent with myofascial pain syndrome and left lateral paraspinal pain.  My independent interpretation of the imaging is consistent with prior pelvic fracture and lumbar spondylosis with lumbar degenerative disc disease.  At this point, her pain does not seem midline or associated with a specific axial/spinal pain. She has a history of pelvic fracture and reports that the pain seems similar to when she fractured her pelvis 4 years ago. We will offer additional imaging to investigate this further.    PLAN:   - I have stressed the importance of physical activity and a home exercise plan to help with pain and improve health.  - Patient can continue with medications for now since they are providing benefits, using them appropriately, and without side effects.  - CT abdomen/pelvis without contrast to evaluate for pelvic fractures  - she is already scheduled to start home health physical therapy; I instructed to consider pelvic therapy with their therapist  - RTC after completion of CT scan  - Counseled patient regarding the importance of  activity modification and physical therapy.    The above plan and management options were discussed at length with patient. Patient is in agreement with the above and verbalized understanding. It will be communicated with the referring physician via electronic record, fax, or mail.    Sara Otero  04/23/2025         [1]   Social History  Socioeconomic History    Marital status:     Number of children: 3    Years of education: 12   Occupational History    Occupation: retired cook/   Tobacco Use    Smoking status: Never    Smokeless tobacco: Never   Substance and Sexual Activity    Alcohol use: Yes     Alcohol/week: 6.0 standard drinks of alcohol     Types: 6 Glasses of wine per week     Comment: occasional /social    Drug use: No    Sexual activity: Not Currently     Partners: Male   Social History Narrative    Adult Screenings updated and reviewed   6/11/14    Mammogram( for females) 4/25/12    Pap ( for females) history of hysterectomy    Colonoscopy age  50    Flu shot 10/14/2013    Td ?  2005    Pneumovax     Zostavax recommended one time at  age  60- not done yet     Eye exam recommended yearly

## 2025-05-13 PROCEDURE — G0180 MD CERTIFICATION HHA PATIENT: HCPCS | Mod: ,,, | Performed by: INTERNAL MEDICINE

## 2025-06-03 ENCOUNTER — PATIENT MESSAGE (OUTPATIENT)
Dept: INTERNAL MEDICINE | Facility: CLINIC | Age: OVER 89
End: 2025-06-03
Payer: MEDICARE

## 2025-06-04 DIAGNOSIS — Z01.84 IMMUNITY TO MEASLES DETERMINED BY SEROLOGIC TEST: Primary | ICD-10-CM

## 2025-06-12 RX ORDER — METOPROLOL TARTRATE 25 MG/1
25 TABLET, FILM COATED ORAL 2 TIMES DAILY
Qty: 180 TABLET | Refills: 3 | Status: SHIPPED | OUTPATIENT
Start: 2025-06-12

## 2025-06-12 NOTE — TELEPHONE ENCOUNTER
Refill Routing Note   Medication(s) are not appropriate for processing by [facility] for the following reason(s):        No active prescription written by provider  Required vitals abnormal    [facility] action(s):  Defer             Appointments  past 12m or future 3m with PCP    Date Provider   Last Visit   4/16/2025 Ivette Mcdaniel MD   Next Visit   Visit date not found Ivette Mcdaniel MD   ED visits in past 90 days: 0        Note composed:12:24 PM 06/12/2025

## 2025-06-12 NOTE — TELEPHONE ENCOUNTER
No care due was identified.  [facility] Embedded Care Due Messages. Reference number: [id number].   6/12/2025 9:46:16 AM CDT

## 2025-06-13 ENCOUNTER — LAB VISIT (OUTPATIENT)
Dept: LAB | Facility: HOSPITAL | Age: OVER 89
End: 2025-06-13
Attending: INTERNAL MEDICINE
Payer: MEDICARE

## 2025-06-13 DIAGNOSIS — Z01.84 IMMUNITY TO MEASLES DETERMINED BY SEROLOGIC TEST: ICD-10-CM

## 2025-06-13 PROCEDURE — 86765 RUBEOLA ANTIBODY: CPT | Mod: HCNC

## 2025-06-13 PROCEDURE — 36415 COLL VENOUS BLD VENIPUNCTURE: CPT | Mod: HCNC,PO

## 2025-06-16 LAB
RUBEOLA (MEASLES) IGG (OHS): >300 AU/ML
RUBEOLA IGG INTERP. (OHS): POSITIVE

## 2025-06-17 ENCOUNTER — RESULTS FOLLOW-UP (OUTPATIENT)
Dept: INTERNAL MEDICINE | Facility: CLINIC | Age: OVER 89
End: 2025-06-17

## 2025-08-07 ENCOUNTER — EXTERNAL HOME HEALTH (OUTPATIENT)
Dept: HOME HEALTH SERVICES | Facility: HOSPITAL | Age: OVER 89
End: 2025-08-07
Payer: MEDICARE

## 2025-09-03 ENCOUNTER — TELEPHONE (OUTPATIENT)
Dept: NEUROLOGY | Facility: CLINIC | Age: OVER 89
End: 2025-09-03
Payer: MEDICARE